# Patient Record
Sex: FEMALE | Race: WHITE | NOT HISPANIC OR LATINO
[De-identification: names, ages, dates, MRNs, and addresses within clinical notes are randomized per-mention and may not be internally consistent; named-entity substitution may affect disease eponyms.]

---
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
## 2024-01-02 ENCOUNTER — NON-APPOINTMENT (OUTPATIENT)
Age: 19
End: 2024-01-02

## 2024-01-08 ENCOUNTER — APPOINTMENT (OUTPATIENT)
Dept: PEDIATRIC GASTROENTEROLOGY | Facility: CLINIC | Age: 19
End: 2024-01-08
Payer: COMMERCIAL

## 2024-01-08 VITALS
BODY MASS INDEX: 22.68 KG/M2 | HEIGHT: 57.76 IN | HEART RATE: 76 BPM | DIASTOLIC BLOOD PRESSURE: 58 MMHG | SYSTOLIC BLOOD PRESSURE: 99 MMHG | WEIGHT: 108.03 LBS

## 2024-01-08 DIAGNOSIS — E55.9 VITAMIN D DEFICIENCY, UNSPECIFIED: ICD-10-CM

## 2024-01-08 DIAGNOSIS — K90.822 SHORT BOWEL SYNDROME WITHOUT COLON IN CONTINUITY: ICD-10-CM

## 2024-01-08 DIAGNOSIS — K59.89 OTHER SPECIFIED FUNCTIONAL INTESTINAL DISORDERS: ICD-10-CM

## 2024-01-08 DIAGNOSIS — E53.8 DEFICIENCY OF OTHER SPECIFIED B GROUP VITAMINS: ICD-10-CM

## 2024-01-08 DIAGNOSIS — Z93.3 COLOSTOMY STATUS: ICD-10-CM

## 2024-01-08 DIAGNOSIS — K50.00 CROHN'S DISEASE OF SMALL INTESTINE W/OUT COMPLICATIONS: ICD-10-CM

## 2024-01-08 PROCEDURE — 99215 OFFICE O/P EST HI 40 MIN: CPT

## 2024-01-09 NOTE — H&P PST ADULT - HEART RATE (BEATS/MIN)
Hi,  Please let him know I wonder if his home cuff is accurate.    I recommend he come in with his home blood pressure cuff for a nurse visit to compare his home pressure cuff and our cuff.  Please call him to reach out and offer a nurse visit for his pressures.    Let me know if patient has any questions.  Thank you, Norris Pantoja     100

## 2024-01-12 NOTE — PROGRESS NOTE ADULT - SUBJECTIVE AND OBJECTIVE BOX
1/12/2024      Aissatou Floyd  [address] [address]        Dear Aissatou    [facility] for your CT Calcium Heart scan. Below is an explanation of the results as well as follow up recommendations from your scan.     Findings:    Your calcium score is 0. There is no identifiable coronary plaque, however a heart healthy lifestyle is recommended to prevent coronary artery disease.     If you have any questions, please call our Heart  at,   [phone].       Sincerely,    [facility] CT Heart Scan Program   Anesthesia Pain Management Service    SUBJECTIVE: Pt doing well with IV PCA without problems reported.    Therapy:	  [ X] IV PCA	   [ ] Epidural           [ ] s/p Spinal Opoid              [ ] Postpartum infusion	  [ ] Patient controlled regional anesthesia (PCRA)    [ ] prn Analgesics    Allergies    Cipro (Hives; Rash)  Dermabond (Hives)  ferric carboxymaltose (Rash (Mild to Mod); Urticaria (Mild to Mod))  Flagyl (Hives)  fluoroquinolone antibiotics (Unknown)  latex (Unknown)  nitroimidazole amebicides (Swelling)    Intolerances      MEDICATIONS  (STANDING):  acetaminophen     Tablet .. 975 milliGRAM(s) Oral every 6 hours  cholecalciferol 1000 Unit(s) Oral daily  cyclobenzaprine 5 milliGRAM(s) Oral every 6 hours  enoxaparin Injectable 30 milliGRAM(s) SubCutaneous every 24 hours  ferrous    sulfate 325 milliGRAM(s) Oral two times a day  HYDROmorphone PCA (1 mG/mL) 30 milliLiter(s) PCA Continuous PCA Continuous  loperamide 12 milliGRAM(s) Oral four times a day  loratadine 10 milliGRAM(s) Oral daily  oxybutynin 10 milliGRAM(s) Oral two times a day  sodium chloride 0.9%. 1000 milliLiter(s) (30 mL/Hr) IV Continuous <Continuous>    MEDICATIONS  (PRN):  HYDROmorphone PCA (1 mG/mL) Rescue Clinician Bolus 0.3 milliGRAM(s) IV Push every 15 minutes PRN for Pain Scale GREATER THAN 6  naloxone Injectable 0.1 milliGRAM(s) IV Push every 3 minutes PRN For ANY of the following changes in patient status:  A. RR LESS THAN 10 breaths per minute, B. Oxygen saturation LESS THAN 90%, C. Sedation score of 6  ondansetron   Disintegrating Tablet 4 milliGRAM(s) Oral once PRN Nausea and/or Vomiting      OBJECTIVE:   [X] No new signs     [ ] Other:    Side Effects:  [X ] None			[ ] Other:    Assessment of Catheter Site:		[ ] Intact		[ ] Other:    ASSESSMENT/PLAN  [ X] Continue current therapy. Recommend non-opioid adjuvant analgesics to be used when possible and when allowed by primary surgical team.    [ ] Therapy changed to:    [ ] IV PCA       [ ] Epidural     [ ] prn Analgesics     Comments:  Patient doing well on current regimen.    Progress Note written now but Patient was seen earlier.

## 2024-01-18 ENCOUNTER — APPOINTMENT (OUTPATIENT)
Dept: PEDIATRICS | Facility: CLINIC | Age: 19
End: 2024-01-18
Payer: COMMERCIAL

## 2024-01-18 VITALS — TEMPERATURE: 97.2 F | WEIGHT: 107.6 LBS

## 2024-01-18 PROCEDURE — 99214 OFFICE O/P EST MOD 30 MIN: CPT

## 2024-01-18 RX ORDER — AMOXICILLIN AND CLAVULANATE POTASSIUM 875; 125 MG/1; MG/1
875-125 TABLET, COATED ORAL
Qty: 20 | Refills: 0 | Status: DISCONTINUED | COMMUNITY
Start: 2023-10-25 | End: 2024-01-18

## 2024-01-22 LAB
25(OH)D3 SERPL-MCNC: 14.4 NG/ML
ALBUMIN SERPL ELPH-MCNC: 5.2 G/DL
ALP BLD-CCNC: 110 U/L
ALT SERPL-CCNC: 35 U/L
ANION GAP SERPL CALC-SCNC: 14 MMOL/L
ANION GAP SERPL CALC-SCNC: 16 MMOL/L
AST SERPL-CCNC: 20 U/L
BILIRUB SERPL-MCNC: 0.3 MG/DL
BUN SERPL-MCNC: 16 MG/DL
BUN SERPL-MCNC: 16 MG/DL
CALCIUM SERPL-MCNC: 9.7 MG/DL
CALCIUM SERPL-MCNC: 9.9 MG/DL
CHLORIDE SERPL-SCNC: 105 MMOL/L
CHLORIDE SERPL-SCNC: 106 MMOL/L
CO2 SERPL-SCNC: 18 MMOL/L
CO2 SERPL-SCNC: 20 MMOL/L
CREAT SERPL-MCNC: 0.64 MG/DL
CREAT SERPL-MCNC: 0.67 MG/DL
EGFR: 129 ML/MIN/1.73M2
EGFR: 130 ML/MIN/1.73M2
GLUCOSE SERPL-MCNC: 124 MG/DL
GLUCOSE SERPL-MCNC: 126 MG/DL
MAGNESIUM SERPL-MCNC: 2.1 MG/DL
POTASSIUM SERPL-SCNC: 4 MMOL/L
POTASSIUM SERPL-SCNC: 4.1 MMOL/L
PROT SERPL-MCNC: 8.4 G/DL
SODIUM SERPL-SCNC: 140 MMOL/L
SODIUM SERPL-SCNC: 140 MMOL/L
VIT B12 SERPL-MCNC: 275 PG/ML

## 2024-01-23 LAB
BACTERIA STL CULT: NORMAL
DEPRECATED O AND P PREP STL: NORMAL
H PYLORI AG STL QL: NEGATIVE
HEMOCCULT STL QL: NEGATIVE
RV AG STL QL IA: NORMAL

## 2024-02-14 ENCOUNTER — NON-APPOINTMENT (OUTPATIENT)
Age: 19
End: 2024-02-14

## 2024-02-14 ENCOUNTER — APPOINTMENT (OUTPATIENT)
Dept: PEDIATRIC SURGERY | Facility: CLINIC | Age: 19
End: 2024-02-14
Payer: COMMERCIAL

## 2024-02-14 VITALS — TEMPERATURE: 97.3 F | WEIGHT: 103.5 LBS | HEIGHT: 57.68 IN | BODY MASS INDEX: 21.73 KG/M2

## 2024-02-14 DIAGNOSIS — Z43.3 ENCOUNTER FOR ATTENTION TO COLOSTOMY: ICD-10-CM

## 2024-02-14 PROCEDURE — 99214 OFFICE O/P EST MOD 30 MIN: CPT

## 2024-02-14 NOTE — PHYSICAL EXAM
[NL] : grossly intact [TextBox_37] : Stoma is pink and viable with liquid stool output, the appliance is correctly in place, the appliance over the mitrofanoff has some urine appreciated

## 2024-02-14 NOTE — CONSULT LETTER
[FreeTextEntry3] : Robert Bolton MD FAAP FACS Director, [facility] Division of Pediatric General, Thoracic and Endoscopic Surgery [facility]

## 2024-02-14 NOTE — HISTORY OF PRESENT ILLNESS
[FreeTextEntry1] : Simon is a 19yo female followed by Dr. Stephens in [place]. She has a history of cloacal exstrophy, bladder fistula s/p tethered cord release, bladder reconstruction, neovagina, and colostomy with Mitrofanoff. She was last seen in the office in October 2021. She was most recently evaluated by Dr. Rai with increased ostomy output, on Loperamide. Recently, Simon has remained compliant with decreasing the amount of Imodium now down to 6-8 tablets 3x/day along with Pectin pills 2x daily. She continues to appreciate issues with the colostomy and excessive, watery outputs. Simon states that she is unable to tolerate any fiber intake, as it causes her to experience nausea and vomiting. She presents today to discuss further treatment options.

## 2024-02-14 NOTE — ADDENDUM
[FreeTextEntry1] : Documented by Jarrod Odonnell acting as a scribe for Dr. Bolton on 02/14/2024.   All medical record entries made by the Scribe were at my, Dr. Bolton, direction and personally dictated by me on 02/14/2024. I have reviewed the chart and agree that the record accurately reflects my personal performances of the history, physical exam, assessment and plan. I have also personally directed, reviewed, and agree with the instructions.

## 2024-02-14 NOTE — ASSESSMENT
[FreeTextEntry1] : Simon is a 19yo female followed by Dr. Stephens in [place]. She has a history of cloacal exstrophy, bladder fistula s/p tethered cord release, bladder reconstruction, neovagina, and colostomy with Mitrofanoff. She continues to experience issues with an excessive watery output from her ostomy site. She is currently treating at home with Imodium and Pectin at home. I counseled Simon and her mother and expressed my overall reassurance regarding her current status. I then informed the family that my recommendation is to proceed with a colostogram for further evaluation. I also informed Simon and her mother that they can increase the dosage of Pectin at home, should this be desired to aid with her output issues. We also discussed possibly incorporating fiber into Simon's diet in the form of Guar Gum 15 g, which the family can try at home to aid with the colostomy output. The family has indicated their understanding and have agreed to proceed with the discussed colostogram. After completion, we will follow up via telehealth or via phone to discuss the results and the treatment plan moving forward. We will schedule this electively. I also informed the family that should they appreciate their issues with the ostomy output resolving after treatment with Guar Gum, they should try to wean off treatment with Imodium and lower the dosage taken daily.

## 2024-02-17 ENCOUNTER — APPOINTMENT (OUTPATIENT)
Dept: PEDIATRICS | Facility: CLINIC | Age: 19
End: 2024-02-17
Payer: COMMERCIAL

## 2024-02-17 VITALS — TEMPERATURE: 97.6 F | WEIGHT: 98.31 LBS

## 2024-02-17 DIAGNOSIS — N39.0 URINARY TRACT INFECTION, SITE NOT SPECIFIED: ICD-10-CM

## 2024-02-17 DIAGNOSIS — J06.9 ACUTE UPPER RESPIRATORY INFECTION, UNSPECIFIED: ICD-10-CM

## 2024-02-17 DIAGNOSIS — R50.9 FEVER, UNSPECIFIED: ICD-10-CM

## 2024-02-17 LAB
BILIRUB UR QL STRIP: NORMAL
GLUCOSE UR-MCNC: NORMAL
HCG UR QL: 0.2 EU/DL
HGB UR QL STRIP.AUTO: NORMAL
KETONES UR-MCNC: 40
LEUKOCYTE ESTERASE UR QL STRIP: NORMAL
NITRITE UR QL STRIP: ABNORMAL
PH UR STRIP: 6.5
PROT UR STRIP-MCNC: 100
SP GR UR STRIP: 1020

## 2024-02-17 PROCEDURE — 81003 URINALYSIS AUTO W/O SCOPE: CPT | Mod: QW

## 2024-02-17 PROCEDURE — 99214 OFFICE O/P EST MOD 30 MIN: CPT | Mod: 25

## 2024-02-17 NOTE — PATIENT PROFILE ADULT - FALL HARM RISK - FALLEN IN PAST
Name: Li Kimball ADMIT: 2024   : 1971  PCP: Tank Shea APRN    MRN: [id number] LOS: 3 days   AGE/SEX: 52 y.o. female  ROOM: [facility]     Subjective   Subjective   Patient is lying on the bed and continues to complain of abdominal discomfort/pain but improving on a day-to-day basis.  Denies nausea, vomiting, chest pain, shortness of breath.       Objective   Objective   Vital Signs  Temp:  [97.3 °F (36.3 °C)-97.9 °F (36.6 °C)] 97.9 °F (36.6 °C)  Heart Rate:  [77-99] 78  Resp:  [16-20] 20  BP: (109-131)/(66-79) 114/74  SpO2:  [97 %-98 %] 98 %  on   ;   Device (Oxygen Therapy): room air  Body mass index is 37.38 kg/m².  Physical Exam  HEENT: PERRLA, extraocular movements intact, Scleras no icterus  Neck: Supple, no JVD  Cardiovascular: Regular rate and rhythm with normal S1 and S2  Respiratory: Fairly clear to auscultation bilaterally with no wheezes  GI: Soft, appropriately tender postoperatively and incision sites noted, bowel sounds are present.  Extremities: No edema, palpable pedal pulses  Neurologic: Grossly nonfocal, no facial asymmetry      Results Review     I reviewed the patient's new clinical results.  Results from last 7 days   Lab Units 24  0508 24  0524 02/15/24  0544 24  0547   WBC 10*3/mm3 7.96 10.43 13.84* 17.95*   HEMOGLOBIN g/dL 10.0* 10.6* 10.7* 11.5*   PLATELETS 10*3/mm3 471* 460* 356 393     Results from last 7 days   Lab Units 24  0508 24  0524 24  0034 02/15/24  0544 24  0547   SODIUM mmol/L 139 139  --  139 136   POTASSIUM mmol/L 3.4* 3.7 3.8 3.1* 3.3*   CHLORIDE mmol/L 110* 108*  --  104 103   CO2 mmol/L 20.6* 21.9*  --  22.1 20.2*   BUN mg/dL 14 23*  --  19 14   CREATININE mg/dL 0.66 0.81  --  0.91 0.80   GLUCOSE mg/dL 102* 102*  --  114* 137*   EGFR mL/min/1.73 105.0 86.9  --  75.6 88.2     Results from last 7 days   Lab Units 24  0737 24  0134   ALBUMIN g/dL 4.0 4.0   BILIRUBIN mg/dL 0.4 0.2   ALK PHOS  U/L 48 46   AST (SGOT) U/L 13 14   ALT (SGPT) U/L 13 15     Results from last 7 days   Lab Units 02/17/24  0508 02/16/24  0524 02/15/24  0544 02/14/24  0547 02/13/24  0737 02/13/24  0134   CALCIUM mg/dL 7.7* 8.1* 8.2* 8.6 9.5 9.9   ALBUMIN g/dL  --   --   --   --  4.0 4.0   MAGNESIUM mg/dL  --   --   --   --  1.8  --      Results from last 7 days   Lab Units 02/13/24  0408   LACTATE mmol/L 1.9     Hemoglobin A1C   Date/Time Value Ref Range Status   02/15/2024 0544 6.20 (H) 4.80 - 5.60 % Final     Glucose   Date/Time Value Ref Range Status   02/17/2024 1128 69 (L) 70 - 130 mg/dL Final   02/17/2024 0621 100 70 - 130 mg/dL Final   02/16/2024 2354 104 70 - 130 mg/dL Final   02/16/2024 1735 112 70 - 130 mg/dL Final   02/16/2024 1124 88 70 - 130 mg/dL Final   02/16/2024 0627 89 70 - 130 mg/dL Final   02/16/2024 0024 96 70 - 130 mg/dL Final       No radiology results for the last day    I have personally reviewed all medications:  Scheduled Medications  acetaminophen, 975 mg, Oral, Q6H  docusate sodium, 100 mg, Oral, BID  enoxaparin, 40 mg, Subcutaneous, Nightly  insulin regular, 2-7 Units, Subcutaneous, Q6H  levothyroxine, 25 mcg, Oral, Q AM  minoxidil, 1.25 mg, Oral, Daily  piperacillin-tazobactam, 3.375 g, Intravenous, Q8H  polyethylene glycol, 17 g, Oral, Daily  sodium chloride, 10 mL, Intravenous, Q12H    Infusions  sodium chloride, 100 mL/hr, Last Rate: 100 mL/hr (02/17/24 1157)    Diet  Diet: Liquid Diets; Clear Liquid; Fluid Consistency: Thin (IDDSI 0)    I have personally reviewed:  [x]  Laboratory   [x]  Microbiology   [x]  Radiology   [x]  EKG/Telemetry  [x]  Cardiology/Vascular   []  Pathology    []  Records       Assessment/Plan     Active Hospital Problems    Diagnosis  POA    **Acute appendicitis [K35.80]  Yes    Colitis [K52.9]  Yes    Diabetes mellitus type 2, diet-controlled [E11.9]  Yes    Chronic anticoagulation [Z79.01]  Not Applicable    Recurrent deep vein thrombosis (DVT) [I82.409]  Yes     Hypothyroidism, adult [E03.9]  Yes      Resolved Hospital Problems   No resolved problems to display.       52 y.o. female admitted with Acute appendicitis.    Acute appendicitis with purulent peritonitis, continue with fluids and status post appendectomy and today's postop day #3.  Continue with IV Zosyn and appreciate surgical input.  Patient remains on liquid diet given that she has ongoing abdominal pain and is improving but I will bite slowly.  Advancement of diet and resumption of Eliquis per surgical team.  Plan is to complete a 10-day course of antibiotics.    2.  Morbid obesity, counseled to lose weight.    3.  Hypothyroidism, on Synthroid.    4.  Hyperglycemia, hemoglobin A1c level is 6.2 and will be placed on metformin upon discharge.  Continue with corrective dose insulin.    5.  History of multiple blood clots including DVT/PE and does have an IVC filter in place.  Eliquis is on hold per surgical team's request and on Lovenox for DVT prophylaxis.    6.  Hypokalemia, on replacement protocol.    7.  CODE STATUS is full code.    8.  DVT prophylaxis, on Lovenox.    Estimated discharge date, next 1 to 2 days.    Copied text on this note has been reviewed by me on 2/17/2024    Donovan Sosa MD  [facility]  02/17/24  14:13 EST       No

## 2024-02-17 NOTE — PHYSICAL EXAM
[Tired appearing] : tired appearing [Soft] : soft [Tender] : nontender [Distended] : nondistended [Normal Bowel Sounds] : normal bowel sounds [NL] : warm, clear [FreeTextEntry4] : nasal congestion [FreeTextEntry9] : +colostomy, area around wnl

## 2024-02-17 NOTE — DISCUSSION/SUMMARY
[FreeTextEntry1] : RVP sent Symptoms likely due to viral URI.  Recommend supportive care including antipyretics, fluids, nasal saline followed by nasal suction and use of humidifier. Discussed honey for cough if over age 1. Consider Mucinex for older kids. Return if symptoms worsen or persist.  UA c/w UTI will start abx mother says augmentin worked well with last UTI in october 2023 UCx sent

## 2024-02-17 NOTE — HISTORY OF PRESENT ILLNESS
[de-identified] : fatigue, poor appetite, abdominal pain since Thursday. Fever of 101F on Friday. Liquidy green stools.  [FreeTextEntry6] : nasal congestion  mild cough

## 2024-02-18 ENCOUNTER — NON-APPOINTMENT (OUTPATIENT)
Age: 19
End: 2024-02-18

## 2024-02-18 LAB
CORONAVIRUS (229E,HKU1,NL63,OC43): DETECTED
RAPID RVP RESULT: DETECTED
SARS-COV-2 RNA PNL RESP NAA+PROBE: NOT DETECTED

## 2024-02-19 ENCOUNTER — NON-APPOINTMENT (OUTPATIENT)
Age: 19
End: 2024-02-19

## 2024-02-19 ENCOUNTER — RX RENEWAL (OUTPATIENT)
Age: 19
End: 2024-02-19

## 2024-02-21 ENCOUNTER — NON-APPOINTMENT (OUTPATIENT)
Age: 19
End: 2024-02-21

## 2024-03-08 ENCOUNTER — APPOINTMENT (OUTPATIENT)
Dept: PEDIATRIC ORTHOPEDIC SURGERY | Facility: CLINIC | Age: 19
End: 2024-03-08
Payer: COMMERCIAL

## 2024-03-08 DIAGNOSIS — M25.572 PAIN IN LEFT ANKLE AND JOINTS OF LEFT FOOT: ICD-10-CM

## 2024-03-08 DIAGNOSIS — M79.672 PAIN IN LEFT FOOT: ICD-10-CM

## 2024-03-08 DIAGNOSIS — Q06.8 OTHER SPECIFIED CONGENITAL MALFORMATIONS OF SPINAL CORD: ICD-10-CM

## 2024-03-08 DIAGNOSIS — Q66.40 CONGEN TALIPES CALCANEOVALGUS, UNSP FOOT: ICD-10-CM

## 2024-03-08 PROCEDURE — 73610 X-RAY EXAM OF ANKLE: CPT | Mod: LT

## 2024-03-08 PROCEDURE — 99214 OFFICE O/P EST MOD 30 MIN: CPT | Mod: 25

## 2024-03-08 NOTE — DEVELOPMENTAL MILESTONES
[Normal] : Developmental history within normal limits [Roll Over: ___ Months] : Roll Over: [unfilled] months [Sit Up: ___ Months] : Sit Up: [unfilled] months [Walk ___ Months] : Walk: [unfilled] months [Pull Self to Stand ___ Months] : Pull self to stand: [unfilled] months [Verbally] : verbally [FreeTextEntry3] : Bilateral AFOs

## 2024-03-12 ENCOUNTER — NON-APPOINTMENT (OUTPATIENT)
Age: 19
End: 2024-03-12

## 2024-03-15 ENCOUNTER — OUTPATIENT (OUTPATIENT)
Dept: OUTPATIENT SERVICES | Facility: HOSPITAL | Age: 19
LOS: 1 days | End: 2024-03-15

## 2024-03-15 ENCOUNTER — APPOINTMENT (OUTPATIENT)
Dept: RADIOLOGY | Facility: HOSPITAL | Age: 19
End: 2024-03-15
Payer: COMMERCIAL

## 2024-03-15 DIAGNOSIS — Q42.3 CONGENITAL ABSENCE, ATRESIA AND STENOSIS OF ANUS WITHOUT FISTULA: Chronic | ICD-10-CM

## 2024-03-15 DIAGNOSIS — Z98.89 OTHER SPECIFIED POSTPROCEDURAL STATES: Chronic | ICD-10-CM

## 2024-03-15 DIAGNOSIS — T14.90 INJURY, UNSPECIFIED: Chronic | ICD-10-CM

## 2024-03-15 DIAGNOSIS — Q45.8 OTHER SPECIFIED CONGENITAL MALFORMATIONS OF DIGESTIVE SYSTEM: Chronic | ICD-10-CM

## 2024-03-15 DIAGNOSIS — M20.60 ACQUIRED DEFORMITIES OF TOE(S), UNSPECIFIED, UNSPECIFIED FOOT: Chronic | ICD-10-CM

## 2024-03-15 DIAGNOSIS — D22.9 MELANOCYTIC NEVI, UNSPECIFIED: Chronic | ICD-10-CM

## 2024-03-15 DIAGNOSIS — Q64.12 CLOACAL EXSTROPHY OF URINARY BLADDER: Chronic | ICD-10-CM

## 2024-03-15 DIAGNOSIS — M95.5 ACQUIRED DEFORMITY OF PELVIS: Chronic | ICD-10-CM

## 2024-03-15 DIAGNOSIS — Q45.8 OTHER SPECIFIED CONGENITAL MALFORMATIONS OF DIGESTIVE SYSTEM: ICD-10-CM

## 2024-03-15 DIAGNOSIS — Q06.8 OTHER SPECIFIED CONGENITAL MALFORMATIONS OF SPINAL CORD: Chronic | ICD-10-CM

## 2024-03-15 DIAGNOSIS — Z98.890 OTHER SPECIFIED POSTPROCEDURAL STATES: Chronic | ICD-10-CM

## 2024-03-15 DIAGNOSIS — Q06.9 CONGENITAL MALFORMATION OF SPINAL CORD, UNSPECIFIED: Chronic | ICD-10-CM

## 2024-03-15 DIAGNOSIS — N35.9 URETHRAL STRICTURE, UNSPECIFIED: Chronic | ICD-10-CM

## 2024-03-15 DIAGNOSIS — N21.0 CALCULUS IN BLADDER: Chronic | ICD-10-CM

## 2024-03-15 DIAGNOSIS — N32.2 VESICAL FISTULA, NOT ELSEWHERE CLASSIFIED: Chronic | ICD-10-CM

## 2024-03-15 PROCEDURE — 74270 X-RAY XM COLON 1CNTRST STD: CPT | Mod: 26

## 2024-04-11 ENCOUNTER — APPOINTMENT (OUTPATIENT)
Dept: PAIN MANAGEMENT | Facility: CLINIC | Age: 19
End: 2024-04-11
Payer: COMMERCIAL

## 2024-04-11 VITALS — HEIGHT: 57 IN | BODY MASS INDEX: 21.14 KG/M2 | WEIGHT: 98 LBS

## 2024-04-11 DIAGNOSIS — Z98.1 ARTHRODESIS STATUS: ICD-10-CM

## 2024-04-11 DIAGNOSIS — M79.18 MYALGIA, OTHER SITE: ICD-10-CM

## 2024-04-11 PROCEDURE — 99213 OFFICE O/P EST LOW 20 MIN: CPT

## 2024-04-11 RX ORDER — METHOCARBAMOL 500 MG/1
500 TABLET, FILM COATED ORAL TWICE DAILY
Qty: 180 | Refills: 1 | Status: ACTIVE | COMMUNITY
Start: 2023-12-07 | End: 1900-01-01

## 2024-04-11 NOTE — DATA REVIEWED
[CT Scan] : CT scan [Thoracic Spine] : thoracic spine [Lumbar Spine] : lumbar spine [Report was reviewed and noted in the chart] : The report was reviewed and noted in the chart [I reviewed the films/CD] : I reviewed the films/CD

## 2024-04-11 NOTE — REASON FOR VISIT
[Follow-Up Visit] : a follow-up pain management visit [Parent] : parent [FreeTextEntry2] : lower back pain

## 2024-04-11 NOTE — ASSESSMENT
[FreeTextEntry1] : A discussion regarding available pain management treatment options occurred with the patient. These included interventional, rehabilitative, pharmacological, and alternative modalities. We will proceed with the following:  Interventional treatment options: - None indicated at present time - Can consider retrial TPI therapy for ongoing lumbar myofascial pain component - see additional instructions below  Rehabilitative options: - continue physical therapy -Encourage participation in active HEP as tolerated  Medication based treatment options: - Continue topical OTC analgesics on as-needed basis - Can trial Celebrex 200 mg daily on as-needed basis - Poor candidate for oral NSAIDs however secondary to chronic GI ulcer; Advise she discuss NSAID use with her gastroenterologist before initiating - Continue methocarbamol 500 mg up to BID as needed for spasm - see additional instructions below  Complementary treatment options: - lifestyle modifications discussed - Previous discussed trial of acupuncture therapy; they are considering  Additional treatment recommendations as follows: - Follow-up with neurosurgery as directed - Patient will follow-up in 3-6 months or as needed basis  The documentation recorded by the scribe, in my presence, accurately reflects the service I personally performed and the decisions made by me with my edits as appropriate.  I, Jassi Naik acting as scribe, attest that this documentation has been prepared under the direction and in the presence of Provider Donte Chau DO.

## 2024-04-11 NOTE — HISTORY OF PRESENT ILLNESS
[Lower back] : lower back [Sudden] : sudden [7] : 7 [Dull/Aching] : dull/aching [Localized] : localized [Radiating] : radiating [Intermittent] : intermittent [Household chores] : household chores [Leisure] : leisure [Social interactions] : social interactions [Nothing helps with pain getting better] : Nothing helps with pain getting better [] : no [FreeTextEntry1] : legs [FreeTextEntry7] : hipd and legs [de-identified] : lumbar and thoracic mri

## 2024-04-11 NOTE — PHYSICAL EXAM
[de-identified] : Constitutional:   - No acute distress   Neurological:   - normal mood and affect   - alert and oriented x 3     Lumbar Spine Exam:   Inspection: erythema (-)  ecchymosis (-)  rashes (-)  alignment: lumbar scoliosis  Well healed surgical scar midline  Palpation:  Midline lumbar tenderness:            (-)  midline thoracic tenderness:          (-)  Lumbar paraspinal tenderness:  L (+) ; R (+)  thoracic paraspinal tenderness: L (-) ; R (-)  sciatic nerve tenderness :          L (-) ; R (-)   ROM: WNL  Strength:   grossly intact  Special Tests:  SLR:                            R (-) ; L (-)  Facet loading:             R (-) ; L (-)   Neurologic:  SILT throughout right lower extremity  SILT throughout left lower extremity   Gait:   mildly antalgic gait  ambulates without assistive device

## 2024-04-16 ENCOUNTER — APPOINTMENT (OUTPATIENT)
Dept: CT IMAGING | Facility: CLINIC | Age: 19
End: 2024-04-16
Payer: COMMERCIAL

## 2024-04-16 ENCOUNTER — APPOINTMENT (OUTPATIENT)
Dept: RADIOLOGY | Facility: CLINIC | Age: 19
End: 2024-04-16
Payer: COMMERCIAL

## 2024-04-16 ENCOUNTER — OUTPATIENT (OUTPATIENT)
Dept: OUTPATIENT SERVICES | Facility: HOSPITAL | Age: 19
LOS: 1 days | End: 2024-04-16
Payer: COMMERCIAL

## 2024-04-16 DIAGNOSIS — D22.9 MELANOCYTIC NEVI, UNSPECIFIED: Chronic | ICD-10-CM

## 2024-04-16 DIAGNOSIS — N21.0 CALCULUS IN BLADDER: Chronic | ICD-10-CM

## 2024-04-16 DIAGNOSIS — Q45.8 OTHER SPECIFIED CONGENITAL MALFORMATIONS OF DIGESTIVE SYSTEM: Chronic | ICD-10-CM

## 2024-04-16 DIAGNOSIS — Q64.12 CLOACAL EXSTROPHY OF URINARY BLADDER: Chronic | ICD-10-CM

## 2024-04-16 DIAGNOSIS — M95.5 ACQUIRED DEFORMITY OF PELVIS: Chronic | ICD-10-CM

## 2024-04-16 DIAGNOSIS — Q42.3 CONGENITAL ABSENCE, ATRESIA AND STENOSIS OF ANUS WITHOUT FISTULA: Chronic | ICD-10-CM

## 2024-04-16 DIAGNOSIS — T14.90 INJURY, UNSPECIFIED: Chronic | ICD-10-CM

## 2024-04-16 DIAGNOSIS — Z98.890 OTHER SPECIFIED POSTPROCEDURAL STATES: Chronic | ICD-10-CM

## 2024-04-16 DIAGNOSIS — Q06.8 OTHER SPECIFIED CONGENITAL MALFORMATIONS OF SPINAL CORD: Chronic | ICD-10-CM

## 2024-04-16 DIAGNOSIS — M20.60 ACQUIRED DEFORMITIES OF TOE(S), UNSPECIFIED, UNSPECIFIED FOOT: Chronic | ICD-10-CM

## 2024-04-16 DIAGNOSIS — Z98.89 OTHER SPECIFIED POSTPROCEDURAL STATES: Chronic | ICD-10-CM

## 2024-04-16 DIAGNOSIS — Q06.9 CONGENITAL MALFORMATION OF SPINAL CORD, UNSPECIFIED: Chronic | ICD-10-CM

## 2024-04-16 DIAGNOSIS — M54.50 LOW BACK PAIN, UNSPECIFIED: ICD-10-CM

## 2024-04-16 DIAGNOSIS — N32.2 VESICAL FISTULA, NOT ELSEWHERE CLASSIFIED: Chronic | ICD-10-CM

## 2024-04-16 DIAGNOSIS — N35.9 URETHRAL STRICTURE, UNSPECIFIED: Chronic | ICD-10-CM

## 2024-04-16 PROCEDURE — 72110 X-RAY EXAM L-2 SPINE 4/>VWS: CPT | Mod: 26

## 2024-04-16 PROCEDURE — 72131 CT LUMBAR SPINE W/O DYE: CPT | Mod: 26

## 2024-04-16 PROCEDURE — 72131 CT LUMBAR SPINE W/O DYE: CPT

## 2024-04-16 PROCEDURE — 72110 X-RAY EXAM L-2 SPINE 4/>VWS: CPT

## 2024-04-19 ENCOUNTER — APPOINTMENT (OUTPATIENT)
Dept: PEDIATRICS | Facility: CLINIC | Age: 19
End: 2024-04-19
Payer: COMMERCIAL

## 2024-04-19 VITALS
OXYGEN SATURATION: 97 % | BODY MASS INDEX: 22.48 KG/M2 | HEART RATE: 84 BPM | DIASTOLIC BLOOD PRESSURE: 64 MMHG | SYSTOLIC BLOOD PRESSURE: 112 MMHG | WEIGHT: 110 LBS | HEIGHT: 58.75 IN

## 2024-04-19 DIAGNOSIS — S90.819A ABRASION, UNSPECIFIED FOOT, INITIAL ENCOUNTER: ICD-10-CM

## 2024-04-19 DIAGNOSIS — K56.600 PARTIAL INTESTINAL OBSTRUCTION, UNSPECIFIED AS TO CAUSE: ICD-10-CM

## 2024-04-19 DIAGNOSIS — R19.7 DIARRHEA, UNSPECIFIED: ICD-10-CM

## 2024-04-19 DIAGNOSIS — M25.572 PAIN IN RIGHT ANKLE AND JOINTS OF RIGHT FOOT: ICD-10-CM

## 2024-04-19 DIAGNOSIS — R19.5 OTHER FECAL ABNORMALITIES: ICD-10-CM

## 2024-04-19 DIAGNOSIS — Z87.898 PERSONAL HISTORY OF OTHER SPECIFIED CONDITIONS: ICD-10-CM

## 2024-04-19 DIAGNOSIS — Z11.59 ENCOUNTER FOR SCREENING FOR OTHER VIRAL DISEASES: ICD-10-CM

## 2024-04-19 DIAGNOSIS — M79.671 PAIN IN RIGHT FOOT: ICD-10-CM

## 2024-04-19 DIAGNOSIS — Z01.818 ENCOUNTER FOR OTHER PREPROCEDURAL EXAMINATION: ICD-10-CM

## 2024-04-19 DIAGNOSIS — Z87.448 PERSONAL HISTORY OF OTHER DISEASES OF URINARY SYSTEM: ICD-10-CM

## 2024-04-19 DIAGNOSIS — R21 RASH AND OTHER NONSPECIFIC SKIN ERUPTION: ICD-10-CM

## 2024-04-19 DIAGNOSIS — R11.0 NAUSEA: ICD-10-CM

## 2024-04-19 DIAGNOSIS — M79.672 PAIN IN RIGHT FOOT: ICD-10-CM

## 2024-04-19 DIAGNOSIS — M25.571 PAIN IN RIGHT ANKLE AND JOINTS OF RIGHT FOOT: ICD-10-CM

## 2024-04-19 DIAGNOSIS — Z00.00 ENCOUNTER FOR GENERAL ADULT MEDICAL EXAMINATION W/OUT ABNORMAL FINDINGS: ICD-10-CM

## 2024-04-19 DIAGNOSIS — Z23 ENCOUNTER FOR IMMUNIZATION: ICD-10-CM

## 2024-04-19 PROCEDURE — 92551 PURE TONE HEARING TEST AIR: CPT

## 2024-04-19 PROCEDURE — 96127 BRIEF EMOTIONAL/BEHAV ASSMT: CPT

## 2024-04-19 PROCEDURE — 90471 IMMUNIZATION ADMIN: CPT

## 2024-04-19 PROCEDURE — 99395 PREV VISIT EST AGE 18-39: CPT | Mod: 25

## 2024-04-19 PROCEDURE — 90715 TDAP VACCINE 7 YRS/> IM: CPT

## 2024-04-19 PROCEDURE — 99173 VISUAL ACUITY SCREEN: CPT | Mod: 59

## 2024-04-19 PROCEDURE — 96160 PT-FOCUSED HLTH RISK ASSMT: CPT | Mod: 59

## 2024-04-19 RX ORDER — NYSTATIN 100000 1/G
100000 POWDER TOPICAL TWICE DAILY
Refills: 0 | Status: COMPLETED | COMMUNITY
Start: 2021-06-14 | End: 2024-04-19

## 2024-04-19 RX ORDER — AMOXICILLIN AND CLAVULANATE POTASSIUM 875; 125 MG/1; MG/1
875-125 TABLET, COATED ORAL
Qty: 20 | Refills: 0 | Status: COMPLETED | COMMUNITY
Start: 2024-02-17 | End: 2024-04-19

## 2024-04-19 RX ORDER — OFLOXACIN OTIC 3 MG/ML
0.3 SOLUTION AURICULAR (OTIC)
Qty: 10 | Refills: 0 | Status: COMPLETED | COMMUNITY
Start: 2023-02-14 | End: 2024-04-19

## 2024-04-19 RX ORDER — MUPIROCIN 20 MG/G
2 OINTMENT TOPICAL 3 TIMES DAILY
Qty: 1 | Refills: 0 | Status: COMPLETED | COMMUNITY
Start: 2024-01-18 | End: 2024-04-19

## 2024-05-03 DIAGNOSIS — E61.1 IRON DEFICIENCY: ICD-10-CM

## 2024-05-14 LAB
BASOPHILS # BLD AUTO: 0.05 K/UL
BASOPHILS NFR BLD AUTO: 0.8 %
EOSINOPHIL # BLD AUTO: 0.07 K/UL
EOSINOPHIL NFR BLD AUTO: 1.1 %
HCT VFR BLD CALC: 34.7 %
HGB BLD-MCNC: 11.2 G/DL
IMM GRANULOCYTES NFR BLD AUTO: 0.3 %
LYMPHOCYTES # BLD AUTO: 1.85 K/UL
LYMPHOCYTES NFR BLD AUTO: 29.4 %
MAN DIFF?: NORMAL
MCHC RBC-ENTMCNC: 31.7 PG
MCHC RBC-ENTMCNC: 32.3 GM/DL
MCV RBC AUTO: 98.3 FL
MONOCYTES # BLD AUTO: 0.37 K/UL
MONOCYTES NFR BLD AUTO: 5.9 %
NEUTROPHILS # BLD AUTO: 3.94 K/UL
NEUTROPHILS NFR BLD AUTO: 62.5 %
PLATELET # BLD AUTO: 373 K/UL
RBC # BLD: 3.53 M/UL
RBC # BLD: 3.53 M/UL
RBC # FLD: 11.7 %
RETICS # AUTO: 1.1 %
RETICS AGGREG/RBC NFR: 38.8 K/UL
WBC # FLD AUTO: 6.3 K/UL

## 2024-05-20 ENCOUNTER — TRANSCRIPTION ENCOUNTER (OUTPATIENT)
Age: 19
End: 2024-05-20

## 2024-05-20 ENCOUNTER — APPOINTMENT (OUTPATIENT)
Dept: CT IMAGING | Facility: CLINIC | Age: 19
End: 2024-05-20
Payer: COMMERCIAL

## 2024-05-20 ENCOUNTER — OUTPATIENT (OUTPATIENT)
Dept: OUTPATIENT SERVICES | Facility: HOSPITAL | Age: 19
LOS: 1 days | End: 2024-05-20
Payer: COMMERCIAL

## 2024-05-20 DIAGNOSIS — T14.90 INJURY, UNSPECIFIED: Chronic | ICD-10-CM

## 2024-05-20 DIAGNOSIS — Q45.8 OTHER SPECIFIED CONGENITAL MALFORMATIONS OF DIGESTIVE SYSTEM: Chronic | ICD-10-CM

## 2024-05-20 DIAGNOSIS — Q42.3 CONGENITAL ABSENCE, ATRESIA AND STENOSIS OF ANUS WITHOUT FISTULA: Chronic | ICD-10-CM

## 2024-05-20 DIAGNOSIS — N32.2 VESICAL FISTULA, NOT ELSEWHERE CLASSIFIED: Chronic | ICD-10-CM

## 2024-05-20 DIAGNOSIS — Z98.890 OTHER SPECIFIED POSTPROCEDURAL STATES: Chronic | ICD-10-CM

## 2024-05-20 DIAGNOSIS — Q06.9 CONGENITAL MALFORMATION OF SPINAL CORD, UNSPECIFIED: Chronic | ICD-10-CM

## 2024-05-20 DIAGNOSIS — Q06.8 OTHER SPECIFIED CONGENITAL MALFORMATIONS OF SPINAL CORD: Chronic | ICD-10-CM

## 2024-05-20 DIAGNOSIS — Q64.12 CLOACAL EXSTROPHY OF URINARY BLADDER: Chronic | ICD-10-CM

## 2024-05-20 DIAGNOSIS — N21.0 CALCULUS IN BLADDER: Chronic | ICD-10-CM

## 2024-05-20 DIAGNOSIS — M20.60 ACQUIRED DEFORMITIES OF TOE(S), UNSPECIFIED, UNSPECIFIED FOOT: Chronic | ICD-10-CM

## 2024-05-20 DIAGNOSIS — Z98.89 OTHER SPECIFIED POSTPROCEDURAL STATES: Chronic | ICD-10-CM

## 2024-05-20 DIAGNOSIS — M95.5 ACQUIRED DEFORMITY OF PELVIS: Chronic | ICD-10-CM

## 2024-05-20 DIAGNOSIS — N35.9 URETHRAL STRICTURE, UNSPECIFIED: Chronic | ICD-10-CM

## 2024-05-20 DIAGNOSIS — D22.9 MELANOCYTIC NEVI, UNSPECIFIED: Chronic | ICD-10-CM

## 2024-05-20 DIAGNOSIS — Z09 ENCOUNTER FOR FOLLOW-UP EXAMINATION AFTER COMPLETED TREATMENT FOR CONDITIONS OTHER THAN MALIGNANT NEOPLASM: ICD-10-CM

## 2024-05-20 PROCEDURE — 72192 CT PELVIS W/O DYE: CPT

## 2024-05-20 PROCEDURE — 72192 CT PELVIS W/O DYE: CPT | Mod: 26

## 2024-05-20 RX ORDER — ONDANSETRON 8 MG/1
8 TABLET ORAL
Qty: 90 | Refills: 3 | Status: ACTIVE | COMMUNITY
Start: 1900-01-01 | End: 1900-01-01

## 2024-06-03 ENCOUNTER — APPOINTMENT (OUTPATIENT)
Dept: DERMATOLOGY | Facility: CLINIC | Age: 19
End: 2024-06-03

## 2024-06-03 NOTE — ED ADULT NURSE NOTE - SUICIDE SCREENING QUESTION 3
oriented to person, place, and time. Mental status is at baseline.      Cranial Nerves: No cranial nerve deficit.      Motor: No weakness.   Psychiatric:         Mood and Affect: Mood normal.         Behavior: Behavior normal.         Thought Content: Thought content normal.         Judgment: Judgment normal.         ASSESSMENT/PLAN:  1. Psoriatic arthritis (HCC)  Assessment & Plan:    patient referred to rheumatology for continued management.  Orders:  -     External Referral To Rheumatology  -     Nancy Hogan MD, Rheumatology, [facility]  -     celecoxib (CELEBREX) 200 MG capsule; Take 1 capsule by mouth daily, Disp-60 capsule, R-5Normal  2. Dysphagia, unspecified type  Assessment & Plan:    referred to GI for further follow-up.  Orders:  -     Emilio Reyes MD, Gastroenterology, [facility]  3. Esophageal stenosis  -     Emilio Reyes MD, Gastroenterology, [facility]  4. Depression with anxiety  Assessment & Plan:  Referred to    [facility]  [address], [place] [id number] , [place] [id number]  Phone [phone] or [phone]    OR    [facility]  [address]  [place] [address]   Orders:  -     hydrOXYzine HCl (ATARAX) 50 MG tablet; Take 1 tablet by mouth every 8 hours as needed for Anxiety Medication is sedating, exercise course driving etc., Disp-90 tablet, R-5Normal  -     busPIRone (BUSPAR) 10 MG tablet; Take 1 tablet by mouth in the morning and at bedtime, Disp-60 tablet, R-5Normal  5. Hearing loss of right ear, unspecified hearing loss type  -     Giovani Tee MD, Otolaryngology, [facility]  6. Colon cancer screening  -     Emilio Reyes MD, Gastroenterology, [facility]  7. Encounter for screening mammogram for malignant neoplasm of breast  -     [facility] MARKOS DIGITAL SCREEN BILATERAL; Future  8. Migraine with aura and without status migrainosus, not 
No

## 2024-06-05 ENCOUNTER — NON-APPOINTMENT (OUTPATIENT)
Age: 19
End: 2024-06-05

## 2024-06-10 ENCOUNTER — APPOINTMENT (OUTPATIENT)
Dept: ORTHOPEDIC SURGERY | Facility: CLINIC | Age: 19
End: 2024-06-10
Payer: COMMERCIAL

## 2024-06-10 VITALS — HEIGHT: 58.75 IN | WEIGHT: 110 LBS | BODY MASS INDEX: 22.48 KG/M2

## 2024-06-10 PROCEDURE — 73503 X-RAY EXAM HIP UNI 4/> VIEWS: CPT | Mod: RT

## 2024-06-10 PROCEDURE — 99215 OFFICE O/P EST HI 40 MIN: CPT

## 2024-06-10 NOTE — CONSULT LETTER
[Dear  ___] : Dear  [unfilled], [Consult Letter:] : I had the pleasure of evaluating your patient, [unfilled]. [Please see my note below.] : Please see my note below. [Sincerely,] : Sincerely, [FreeTextEntry3] : Dr. Jose López

## 2024-06-10 NOTE — ADDENDUM
[FreeTextEntry1] : Documented by Aletha Law acting as a scribe for Dr. López on 06/10/2024. All medical record entries made by the Scribe were at my, Dr. López's, direction and personally dictated by me on 06/10/2024. I have reviewed the chart and agree that the record accurately reflects my personal performance of the history, physical exam, procedure and imaging.

## 2024-06-10 NOTE — PHYSICAL EXAM
[de-identified] : General: Well appearing, no acute distress Neurologic: A&Ox3, No focal deficits Head: NCAT without abrasions, lacerations, or ecchymosis to head, face, or scalp Eyes: No scleral icterus, no gross abnormalities Respiratory: Equal chest wall expansion bilaterally, no accessory muscle use Lymphatic: No lymphadenopathy palpated Skin: Warm and dry Psychiatric: Normal mood and affect    [de-identified] : The following radiographs were ordered and read by me during this patient's visit. I reviewed each radiograph in detail with the patient and discussed the findings as highlighted below. 3 views of the pelvis and 2 views of the right hip were obtained today that show hip dysplasia of about 15 degrees.

## 2024-06-10 NOTE — DISCUSSION/SUMMARY
[de-identified] : We had a thorough discussion regarding the nature of her pain, the pathophysiology, as well as all treatment options. Based on clinical exam and radiograph findings they have right hip dysplasia. I discussed operative and non-operative treatment modalities, such as a pelvic osteotomy. I recommend the patient f/u with a surgeon who specializes in osteotomies. All questions were answered and the patient verbalized understanding. The patient is in agreement with this treatment plan.

## 2024-06-10 NOTE — HISTORY OF PRESENT ILLNESS
[de-identified] : GERI is a 18 year female here today for right hip pain. She endorses pain in her buttocks. She has a hx of multiple fusion surgeries.

## 2024-06-11 ENCOUNTER — APPOINTMENT (OUTPATIENT)
Dept: PEDIATRIC ORTHOPEDIC SURGERY | Facility: CLINIC | Age: 19
End: 2024-06-11
Payer: COMMERCIAL

## 2024-06-11 ENCOUNTER — NON-APPOINTMENT (OUTPATIENT)
Age: 19
End: 2024-06-11

## 2024-06-11 ENCOUNTER — APPOINTMENT (OUTPATIENT)
Dept: PAIN MANAGEMENT | Facility: CLINIC | Age: 19
End: 2024-06-11

## 2024-06-11 DIAGNOSIS — Q45.8 OTHER SPECIFIED CONGENITAL MALFORMATIONS OF DIGESTIVE SYSTEM: ICD-10-CM

## 2024-06-11 DIAGNOSIS — Q64.12 OTHER SPECIFIED CONGENITAL MALFORMATIONS OF DIGESTIVE SYSTEM: ICD-10-CM

## 2024-06-11 DIAGNOSIS — Q65.89 OTHER SPECIFIED CONGENITAL DEFORMITIES OF HIP: ICD-10-CM

## 2024-06-11 PROBLEM — M79.672 LEFT FOOT PAIN: Status: ACTIVE | Noted: 2022-08-09

## 2024-06-11 PROCEDURE — 99204 OFFICE O/P NEW MOD 45 MIN: CPT

## 2024-06-11 NOTE — DATA REVIEWED
[de-identified] : Left ankle 3 view radiographs were obtained and independently reviewed during today's visit. There are no signs of fracture, dislocation, bony injury noted. There is hardware in place. Talar dome appears well reduced within ankle mortise. Chronic changes about the foot present.  Prior obtained imaging was once again reviewed and is as noted below. Left foot MRI was obtained at [facility] on 6 6/18/22  Osseous edema throughout the second metatarsal head which is centered about the subarticular surface.  Findings are concerning for prominent osseous stress reaction or developing subchondral fracture.  Additional periphyseal edema about the third metatarsal head also concerning for stress reaction.  Varus alignment of the second toe with superimposed hallux valgus.  The visualized capsular  structures appear intact

## 2024-06-11 NOTE — PHYSICAL EXAM
[Oriented x3] : oriented to person, place, and time [Conjunctiva] : normal conjunctiva [Eyelids] : normal eyelids [Pupils] : pupils were equal and round [Ears] : normal ears [Nose] : normal nose [Lips] : normal lips [Normal] : The patient is in no apparent respiratory distress. They're taking full deep breaths without use of accessory muscles or evidence of audible wheezes or stridor without the use of a stethoscope [Rash] : no rash [Lesions] : no lesions [Ulcers] : no ulcers [FreeTextEntry1] : Pleasant and cooperative with exam, appropriate for age.  Gait: Ambulates without evidence of antalgia.  No toe drag noted. Positive bilateral high riding arches. She is unable to walk on the ball of her left foot due to her history of a calcaneovalgus deformity.  Left foot/ankle:  Skin intact, no warmth, erythema or ecchymosis noted Limited plantar flexion noted with a positive calcaneal valgus deformity noted with dorsiflexion of 40.   No discomfort elicited with palpation over the Achilles tendon, anterior aspect of the ankle or tarsal bones of the foot dorsally.  Mild stiffness with hindfoot range of motion noted.  Positive high arch is noted.  Full sensation to palpation.  4/5 muscle strength with plantar flexion. Surgical scars from previous surgery noted.  Capillary refill less than 2 seconds noted.  +2 pulse pulses palpated and noted.  The ankle joint is stable to stress maneuvers.  Positive calf atrophy noted due to her underlying condition.

## 2024-06-11 NOTE — REASON FOR VISIT
[Follow Up] : a follow up visit [Patient] : patient [Mother] : mother [FreeTextEntry1] : Chronic left ankle pain

## 2024-06-11 NOTE — ASSESSMENT
[FreeTextEntry1] : 18-year-old female who has a history of Cloacal exstrophy, tethered cord, and hip dysplasia with bilateral high arches in her feet and left calcaneovalgus deformity presents today for chronic left ankle pain without injury since January 2024. Patient has had multiple foot procedures at outside institution.  -We discussed the history, physical exam, and all available radiographs at length during today's visit with patient and her parent/guardian who served as an independent historian due to child's age and unreliable nature of history. -Outside documentation was reviewed -Left ankle 3 view radiographs were obtained and independently reviewed during today's visit. There are no signs of fracture, dislocation, bony injury noted. There is hardware in place. Talar dome appears well reduced within ankle mortise. Chronic changes about the foot present. -Clinically, she continues to have discomfort about the left foot and ankle. Recent surgery was performed at outside institution. -At this time, given the involved nature of her condition and multiple prior surgeries at outside institution, recommendation is to continue following up with her index surgeon for management.  Additionally, we again discussed obtaining a second opinion from our adult foot and ankle surgeon, Dr. Che.  Also recommended continued follow-up/management by PM&R/pain management. -She may continue with activity as tolerated. -We will plan to see her back in clinic as-needed basis or if new concerns arise.   All questions and concerns were addressed today. Parent and patient verbalize understanding and agree with plan of care.  I, Dalia Berry, have acted as a scribe and documented the above information for Dr. Soriano.

## 2024-06-11 NOTE — HISTORY OF PRESENT ILLNESS
[FreeTextEntry1] : 18 year old female with PMH remarkable for tethered cord which has had 7 surgeries in the past by Dr. 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 She was treated treated by Dr. Doty for this in the past. As per history, patient has also been treated in the past by Dr. Pichardo for tendon lengthening and transfers in bilateral feet. Dr. Pichardo is not currently practicing at this time so she was referred to Dr. Doty. Patient states she has noticed that her great toe has migrated inward and her hammer toes have progressed. She has had AFOs, SMOs, and other braces in the past that have given her blisters and she refuses to wear them. 2/25/22, Simon reports that she participates in physical therapy 3 times a week due to her underlying condition. She has been experiencing left posterior knee pain for 3 months with occasional clicking. Dr. 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 She also has a history of hip dysplasia which was also treated surgically by Dr. Pichardo.  On May 13, 2022 due to increased left foot and ankle pain.  At that time an MRI of the area was ordered.  She was able to obtain the MRI in June 2022 but was unable to obtain follow-up until today.  She continues to have discomfort about the left ankle.  She is able to bear weight though she has pain.  She followed up with Dr. Pichardo over the summer who recommended surgery of the bilateral feet.  Of note she had a posterior spinal fusion on 11/10/2022 at [facility]. On 1/6/23 She was referred to Dr. Che for continued management of her chronic foot and ankle deformity/pain.  Today, she presents due to continued left ankle pain.  Of note she was seen by Dr. Pichardo and had repeat surgery in May 2023.  She was casted for approximately 6 weeks.  She notes that her ankle pain began in January 2024.  She has been in physical therapy which is only minimally improved her discomfort.  She was seen by a pain management specialist and started on methocarbamol.  She presents today for evaluation of her left ankle pain.

## 2024-06-11 NOTE — REVIEW OF SYSTEMS
[Change in Activity] : change in activity [Joint Pains] : arthralgias [Fever Above 102] : no fever [Malaise] : no malaise [Rash] : no rash [Nasal Stuffiness] : no nasal congestion [Wheezing] : no wheezing [Cough] : no cough [Vomiting] : no vomiting [Constipation] : no constipation [Limping] : no limping [Joint Swelling] : no joint swelling [Sleep Disturbances] : ~T no sleep disturbances

## 2024-06-12 PROBLEM — Q65.89 HIP DYSPLASIA, CONGENITAL: Status: ACTIVE | Noted: 2023-01-20

## 2024-06-12 NOTE — ASSESSMENT
[FreeTextEntry1] : 18-year-old female who has a history of Cloacal exstrophy, tethered cord, and hip dysplasia with bilateral high arches in her feet and left calcaneovalgus deformity presents today for right hip pain x 1 year  We reviewed Simon's physical exam today along with her recent imaging which included plain films of the lumbar spine and pelvis as well as CT imaging of the lumbar spine and pelvis.  Because of her underlying condition and abnormal anatomy, I would like to further investigate the area of discomfort with an MRI of the pelvis.  Will plan to see her back in office following MRI to discuss the results. This plan was discussed with family and all questions and concerns were addressed today.  Also upon follow-up, I would like to get a full-length EOS x-ray of her spine and lower extremities to assess global alignment.  We spent 31 minutes on HPI, Clinical exam, ordering/ reviewing all imaging, reviewing any existing record, reviewing findings and counseling patient to treatment, differentials, etiology, prognosis, natural history, implications on ADLs, activities limitations/modifications, genetics, answering questions and addressing concerns, treatment goals and documenting in the EHR.   I, Lisbeth Cope PA-C, have acted as a scribe and documented the above for Dr. Morfin  The above documentation completed by the scribe is an accurate record of both my words and actions.

## 2024-06-12 NOTE — HISTORY OF PRESENT ILLNESS
[FreeTextEntry1] :  18 year old female with PMH remarkable for cloacal extrophy and tethered cord for which has had 7 surgeries in the past by Dr. Vargas (NSGY at [facility]), brought in by her mother for right low back/hip pain.   As per history, patient has been treated in the past by Dr. Pichardo for tendon lengthening and transfers in bilateral feet. She also has a history of hip dysplasia which was also treated surgically by Dr. Pichardo.  Dr. Pichardo is not currently practicing and at some point, she was referred to Dr. Doty. She had a posterior spinal fusion on 11/10/2022 at [facility] by Dr. Vargas and Dr. Keith. She has also seen My partner Dr. Soriano in March 2024 for left ankle pain. Most recently, she saw Dr. Jose Lpóez 6/10/24 for her current complaint of low back pain/right hip pain, who referred her to us for evaluation.. She has had AFOs, SMOs, and other braces in the past that have given her blisters and she refuses to wear them. Simon reports that she participates in physical therapy 3 times a week due to her underlying condition.    She explained that she has been experiencing right low back/right hip pain for the last year or so.  This started about 6 months after her posterior spinal fusion surgery.  No other injury or trauma reported.  She follows with pain management and will be seeing them later today.  She takes Celebrex and methocarbamol for her discomfort.  She has had recent imaging lower back and pelvis, including CT of the lumbar spine and pelvis.  Typically her pain does not radiate but today she actually woke up with discomfort that is radiating down the back of her right thigh to just above her knee.  They are here today for further orthopedic care.

## 2024-06-12 NOTE — DATA REVIEWED
[de-identified] : CT imaging of the pelvis and lumbar spine in [employer] system from 2024 reviewed in office today.  Plain radiographs of the lumbar spine from April 2024 in [employer] system reviewed today.   Plain radiographs of the pelvis 6/20/24 reviewed in [facility] system.  No new images taken today.

## 2024-06-12 NOTE — REASON FOR VISIT
[Initial Evaluation] : an initial evaluation [Patient] : patient [Mother] : mother [FreeTextEntry1] : low back/hip pain

## 2024-06-12 NOTE — PHYSICAL EXAM
[FreeTextEntry1] : Healthy appearing 18 year-old child. Awake, alert, in no acute distress. Pleasant and cooperative.  Eyes are clear with no sclera abnormalities. External ears, nose and mouth are clear.  Good respiratory effort with no audible wheezing without use of a stethoscope. Ambulates independently.  No toe drag noted. Positive bilateral high riding arches. She is unable to walk on the ball of her left foot due to her history of a calcaneovalgus deformity. Able to get on and off exam table without difficulty.   Spine: surgical incisions are well healed.  TTP right sacral region

## 2024-06-13 NOTE — PHYSICAL EXAM
[de-identified] : Constitutional:   - No acute distress   Neurological:   - normal mood and affect   - alert and oriented x 3     Lumbar Spine Exam:   Inspection: erythema (-)  ecchymosis (-)  rashes (-)  alignment: lumbar scoliosis  Well healed surgical scar midline  Palpation:  Midline lumbar tenderness:            (-)  midline thoracic tenderness:          (-)  Lumbar paraspinal tenderness:  L (+) ; R (+)  thoracic paraspinal tenderness: L (-) ; R (-)  sciatic nerve tenderness :          L (-) ; R (-)   ROM: WNL  Strength:   grossly intact  Special Tests:  SLR:                            R (-) ; L (-)  Facet loading:             R (-) ; L (-)   Neurologic:  SILT throughout right lower extremity  SILT throughout left lower extremity   Gait:   mildly antalgic gait  ambulates without assistive device

## 2024-06-13 NOTE — HISTORY OF PRESENT ILLNESS
[FreeTextEntry1] : 6/11/2024 - Patient presents for FUV regarding their lower back pain.   4/11/2024 - Patient presents for 4-month FUV regarding their lower back pain.  She has had her typical baseline pain since her last visit which waxes and wanes in severity, she has been using methocarbamol PRN with meaningful benefit.  Was prescribed Celebrex by her orthopedic surgeon however has not yet started.  Reports having undergone a trigger point injection at her chiropractor's office however had an adverse reaction.  She is unsure if it is an allergy.  12/7/2023- Patient presents for FUV regarding their lower back pain.  She is accompanied by her mother.  Patient states she has had her typical baseline pain since her last visit which waxes and wanes in severity however is experiencing a significant worsening over the past couple days.  Her pain is still across the back (R>L) with no radiation to the lower extremities; she is still in PT and tried taking cyclobenzaprine 5mg twice a day with no meaningful benefit.  Had recent visit with her neurosurgeon; they state that most recent imaging does not report any changes.  9/28/2023- The patient presents for initial evaluation regarding their low back pain.   She is accompanied by her mother.  Patient with a very complex medical history requiring multiple various surgical interventions (70+ reported).   Patient's presenting pain complaint today is regarding her low back pain.  Pain is predominately right sided without radiation to the buttock or lower extremity.  She underwent a T11-L3 posterior spinal fusion on 11/10/2023 (Dr. Cristiano Vargas); she reports having pain since that time.  The patient has had a history of multiple spinal surgeries for tethered cord throughout the years.  Currently, she takes Tylenol, Ibuprofen, and Flexeril PRN for pain management with varying degrees of benefit; gastric ulcers history requires caution with NSAIDs. Patient is currently involved in PT with meaningful benefit.   Injections:  1) TPI  Pertinent Surgical History: 1) T11-L3 Posterior Spinal Fusion (11/10/2022) - Dr. Edwin Vargas  Physician Disclaimer: I have personally reviewed and confirmed all HPI data with the patient.

## 2024-06-26 ENCOUNTER — APPOINTMENT (OUTPATIENT)
Dept: PEDIATRIC HEMATOLOGY/ONCOLOGY | Facility: CLINIC | Age: 19
End: 2024-06-26

## 2024-07-02 ENCOUNTER — APPOINTMENT (OUTPATIENT)
Dept: PEDIATRICS | Facility: CLINIC | Age: 19
End: 2024-07-02
Payer: COMMERCIAL

## 2024-07-02 VITALS — TEMPERATURE: 98 F | WEIGHT: 110 LBS

## 2024-07-02 DIAGNOSIS — Z87.2 PERSONAL HISTORY OF DISEASES OF THE SKIN AND SUBCUTANEOUS TISSUE: ICD-10-CM

## 2024-07-02 DIAGNOSIS — L01.00 IMPETIGO, UNSPECIFIED: ICD-10-CM

## 2024-07-02 PROCEDURE — 99214 OFFICE O/P EST MOD 30 MIN: CPT

## 2024-07-02 RX ORDER — MUPIROCIN 20 MG/G
2 OINTMENT TOPICAL 3 TIMES DAILY
Qty: 1 | Refills: 3 | Status: ACTIVE | COMMUNITY
Start: 2024-07-02 | End: 1900-01-01

## 2024-07-02 RX ORDER — CEFADROXIL 500 MG/1
500 CAPSULE ORAL
Qty: 20 | Refills: 0 | Status: ACTIVE | COMMUNITY
Start: 2024-07-02 | End: 1900-01-01

## 2024-07-06 ENCOUNTER — OUTPATIENT (OUTPATIENT)
Dept: OUTPATIENT SERVICES | Facility: HOSPITAL | Age: 19
LOS: 1 days | End: 2024-07-06
Payer: COMMERCIAL

## 2024-07-06 ENCOUNTER — APPOINTMENT (OUTPATIENT)
Dept: MRI IMAGING | Facility: CLINIC | Age: 19
End: 2024-07-06
Payer: COMMERCIAL

## 2024-07-06 DIAGNOSIS — M95.5 ACQUIRED DEFORMITY OF PELVIS: Chronic | ICD-10-CM

## 2024-07-06 DIAGNOSIS — Q45.8 OTHER SPECIFIED CONGENITAL MALFORMATIONS OF DIGESTIVE SYSTEM: Chronic | ICD-10-CM

## 2024-07-06 DIAGNOSIS — N21.0 CALCULUS IN BLADDER: Chronic | ICD-10-CM

## 2024-07-06 DIAGNOSIS — Q65.89 OTHER SPECIFIED CONGENITAL DEFORMITIES OF HIP: ICD-10-CM

## 2024-07-06 DIAGNOSIS — Q64.12 CLOACAL EXSTROPHY OF URINARY BLADDER: Chronic | ICD-10-CM

## 2024-07-06 DIAGNOSIS — Q42.3 CONGENITAL ABSENCE, ATRESIA AND STENOSIS OF ANUS WITHOUT FISTULA: Chronic | ICD-10-CM

## 2024-07-06 DIAGNOSIS — Q06.9 CONGENITAL MALFORMATION OF SPINAL CORD, UNSPECIFIED: Chronic | ICD-10-CM

## 2024-07-06 DIAGNOSIS — Q45.8 OTHER SPECIFIED CONGENITAL MALFORMATIONS OF DIGESTIVE SYSTEM: ICD-10-CM

## 2024-07-06 DIAGNOSIS — Z98.89 OTHER SPECIFIED POSTPROCEDURAL STATES: Chronic | ICD-10-CM

## 2024-07-06 DIAGNOSIS — Z98.890 OTHER SPECIFIED POSTPROCEDURAL STATES: Chronic | ICD-10-CM

## 2024-07-06 DIAGNOSIS — T14.90 INJURY, UNSPECIFIED: Chronic | ICD-10-CM

## 2024-07-06 DIAGNOSIS — M20.60 ACQUIRED DEFORMITIES OF TOE(S), UNSPECIFIED, UNSPECIFIED FOOT: Chronic | ICD-10-CM

## 2024-07-06 DIAGNOSIS — N32.2 VESICAL FISTULA, NOT ELSEWHERE CLASSIFIED: Chronic | ICD-10-CM

## 2024-07-06 DIAGNOSIS — D22.9 MELANOCYTIC NEVI, UNSPECIFIED: Chronic | ICD-10-CM

## 2024-07-06 DIAGNOSIS — Q06.8 OTHER SPECIFIED CONGENITAL MALFORMATIONS OF SPINAL CORD: Chronic | ICD-10-CM

## 2024-07-06 DIAGNOSIS — N35.9 URETHRAL STRICTURE, UNSPECIFIED: Chronic | ICD-10-CM

## 2024-07-06 PROCEDURE — 72195 MRI PELVIS W/O DYE: CPT | Mod: 26

## 2024-07-06 PROCEDURE — 72195 MRI PELVIS W/O DYE: CPT

## 2024-07-12 ENCOUNTER — TRANSCRIPTION ENCOUNTER (OUTPATIENT)
Age: 19
End: 2024-07-12

## 2024-07-30 ENCOUNTER — APPOINTMENT (OUTPATIENT)
Dept: PEDIATRIC ORTHOPEDIC SURGERY | Facility: CLINIC | Age: 19
End: 2024-07-30
Payer: COMMERCIAL

## 2024-07-30 DIAGNOSIS — Q45.8 OTHER SPECIFIED CONGENITAL MALFORMATIONS OF DIGESTIVE SYSTEM: ICD-10-CM

## 2024-07-30 DIAGNOSIS — Q65.89 OTHER SPECIFIED CONGENITAL DEFORMITIES OF HIP: ICD-10-CM

## 2024-07-30 DIAGNOSIS — Q64.12 OTHER SPECIFIED CONGENITAL MALFORMATIONS OF DIGESTIVE SYSTEM: ICD-10-CM

## 2024-07-30 PROCEDURE — 99214 OFFICE O/P EST MOD 30 MIN: CPT

## 2024-07-30 NOTE — HISTORY OF PRESENT ILLNESS
[FreeTextEntry1] :  18 year old female with PMH remarkable for cloacal extrophy and tethered cord for which has had 7 surgeries in the past by Dr. Vargas (NSGY at [facility]), brought in by her mother for right low back/hip pain.  As per history, patient has been treated in the past by Dr. Pichardo for tendon lengthening and transfers in bilateral feet. She also has a history of hip dysplasia which was also treated surgically by Dr. Pichardo.  Dr. Pichardo is not currently practicing and at some point, she was referred to Dr. Doty. She had a posterior spinal fusion on 11/10/2022 at [facility] by Dr. Vargas and Dr. Keith. She has also seen My partner Dr. Soriano in March 2024 for left ankle pain. Most recently, she saw Dr. Jose López 6/10/24 for her current complaint of low back pain/right hip pain, who referred her to us for evaluation.. She has had AFOs, SMOs, and other braces in the past that have given her blisters and she refuses to wear them. Simon reports that she participates in physical therapy 3 times a week due to her underlying condition.    On initial evaluation in our clinic she explained that she has been experiencing right low back/right hip pain for the last year or so.  This started about 6 months after her posterior spinal fusion surgery.  No other injury or trauma reported.  She follows with pain management.  She takes Celebrex, methocarbamol, and oxycodone for her discomfort.  On initial evaluation an MRI of the pelvis was ordered.   Today, she continues to have daily right hip/back pain. She reports that is has not improved from initial evaluation. She continues to take pain medication every few hours. She presents today for continued management of the above.

## 2024-07-30 NOTE — DATA REVIEWED
[de-identified] : My review and interpretation of the radiologic studies: MRI Pelvis was obtained at [facility] imaging was obtained on 7/6/24 and reviewed today: Acetabular dysplasia bilaterally, greater on the right as outlined above. Redemonstration of a dysplastic appearing pelvis and sacrum. Unchanged diastases of the pubic symphysis. 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 Hardware in place about the back without radiographic evidence of hardware complications. Bilateral hip dysplasia noted.   Prior obtained imaging was once again reviewed and is as noted below. CT imaging of the pelvis and lumbar spine in [employer] system from 2024 reviewed in office today.  Plain radiographs of the lumbar spine from April 2024 in [employer] system reviewed today.   Plain radiographs of the pelvis 6/20/24 reviewed in [employer] system.

## 2024-07-30 NOTE — REASON FOR VISIT
[Follow Up] : a follow up visit [Patient] : patient [Mother] : mother [FreeTextEntry1] : low back/hip pain

## 2024-07-30 NOTE — ASSESSMENT
[FreeTextEntry1] : 18-year-old female who has a history of Cloacal exstrophy, tethered cord, and hip dysplasia with bilateral high arches in her feet and left calcaneovalgus deformity presents today for right hip pain x 1 year  We reviewed Simon's physical exam today along with her recent imaging of full length radiographs and her pelvis MRI.  Clinically, she continues to have significant back and hip discomfort requiring around the clock pain medication. Based on clinical examination and imaging reviewed today recommendation at this time is to complete a course of PT focused on modalities such as deep tissue massage and laser treatments. She can continue with activities to tolerance. We will plan to see her back in office in 2-3 months for repeat clinical evaluation. If her pain is not improved at that time operative interventions will be discussed.  We spent 30 minutes on HPI, Clinical exam, ordering/ reviewing all imaging, reviewing any existing record, reviewing findings and counseling patient to treatment, differentials, etiology, prognosis, natural history, implications on ADLs, activities limitations/modifications, genetics, answering questions and addressing concerns, treatment goals and documenting in the EHR.  All questions and concerns were addressed today. Parent and patient verbalize understanding and agree with plan of care.  I, Dalia Berry, have acted as a scribe and documented the above information for Dr. Morfin  The above documentation completed by the scribe is an accurate record of both my words and actions.

## 2024-07-30 NOTE — REVIEW OF SYSTEMS
[Change in Activity] : change in activity [Joint Pains] : arthralgias [No Acute Changes] : No acute changes since previous visit [Fever Above 102] : no fever [Malaise] : no malaise [Rash] : no rash [Nasal Stuffiness] : no nasal congestion [Wheezing] : no wheezing [Cough] : no cough [Vomiting] : no vomiting [Constipation] : no constipation [Limping] : no limping [Joint Swelling] : no joint swelling [Sleep Disturbances] : ~T no sleep disturbances

## 2024-07-30 NOTE — PHYSICAL EXAM
[FreeTextEntry1] : Healthy appearing 18 year-old female. Awake, alert, in no acute distress. Pleasant and cooperative.  Eyes are clear with no sclera abnormalities. External ears, nose and mouth are clear.  Good respiratory effort with no audible wheezing without use of a stethoscope. Ambulates independently.  No toe drag noted. Positive bilateral high riding arches. She is unable to walk on the ball of her left foot due to her history of a calcaneovalgus deformity. Able to get on and off exam table without difficulty.   Spine: surgical incisions are well healed.  TTP right sacral region

## 2024-08-22 ENCOUNTER — APPOINTMENT (OUTPATIENT)
Dept: DERMATOLOGY | Facility: CLINIC | Age: 19
End: 2024-08-22

## 2024-09-04 ENCOUNTER — APPOINTMENT (OUTPATIENT)
Dept: MRI IMAGING | Facility: CLINIC | Age: 19
End: 2024-09-04

## 2024-09-04 ENCOUNTER — OUTPATIENT (OUTPATIENT)
Dept: OUTPATIENT SERVICES | Facility: HOSPITAL | Age: 19
LOS: 1 days | End: 2024-09-04

## 2024-09-04 ENCOUNTER — OUTPATIENT (OUTPATIENT)
Dept: OUTPATIENT SERVICES | Facility: HOSPITAL | Age: 19
LOS: 1 days | End: 2024-09-04
Payer: COMMERCIAL

## 2024-09-04 DIAGNOSIS — M95.5 ACQUIRED DEFORMITY OF PELVIS: Chronic | ICD-10-CM

## 2024-09-04 DIAGNOSIS — Q06.9 CONGENITAL MALFORMATION OF SPINAL CORD, UNSPECIFIED: Chronic | ICD-10-CM

## 2024-09-04 DIAGNOSIS — Z98.89 OTHER SPECIFIED POSTPROCEDURAL STATES: Chronic | ICD-10-CM

## 2024-09-04 DIAGNOSIS — M20.60 ACQUIRED DEFORMITIES OF TOE(S), UNSPECIFIED, UNSPECIFIED FOOT: Chronic | ICD-10-CM

## 2024-09-04 DIAGNOSIS — Q45.8 OTHER SPECIFIED CONGENITAL MALFORMATIONS OF DIGESTIVE SYSTEM: Chronic | ICD-10-CM

## 2024-09-04 DIAGNOSIS — Q42.3 CONGENITAL ABSENCE, ATRESIA AND STENOSIS OF ANUS WITHOUT FISTULA: Chronic | ICD-10-CM

## 2024-09-04 DIAGNOSIS — T14.90 INJURY, UNSPECIFIED: Chronic | ICD-10-CM

## 2024-09-04 DIAGNOSIS — Q06.8 OTHER SPECIFIED CONGENITAL MALFORMATIONS OF SPINAL CORD: Chronic | ICD-10-CM

## 2024-09-04 DIAGNOSIS — Q64.12 CLOACAL EXSTROPHY OF URINARY BLADDER: Chronic | ICD-10-CM

## 2024-09-04 DIAGNOSIS — D22.9 MELANOCYTIC NEVI, UNSPECIFIED: Chronic | ICD-10-CM

## 2024-09-04 DIAGNOSIS — N21.0 CALCULUS IN BLADDER: Chronic | ICD-10-CM

## 2024-09-04 DIAGNOSIS — Z98.890 OTHER SPECIFIED POSTPROCEDURAL STATES: Chronic | ICD-10-CM

## 2024-09-04 DIAGNOSIS — M54.9 DORSALGIA, UNSPECIFIED: ICD-10-CM

## 2024-09-04 DIAGNOSIS — Z00.8 ENCOUNTER FOR OTHER GENERAL EXAMINATION: ICD-10-CM

## 2024-09-04 DIAGNOSIS — N32.2 VESICAL FISTULA, NOT ELSEWHERE CLASSIFIED: Chronic | ICD-10-CM

## 2024-09-04 DIAGNOSIS — N35.9 URETHRAL STRICTURE, UNSPECIFIED: Chronic | ICD-10-CM

## 2024-09-04 PROCEDURE — 73721 MRI JNT OF LWR EXTRE W/O DYE: CPT | Mod: 26,LT

## 2024-09-06 ENCOUNTER — APPOINTMENT (OUTPATIENT)
Dept: ORTHOPEDIC SURGERY | Facility: CLINIC | Age: 19
End: 2024-09-06
Payer: COMMERCIAL

## 2024-09-06 DIAGNOSIS — R93.7 ABNORMAL FINDINGS ON DIAGNOSTIC IMAGING OF OTHER PARTS OF MUSCULOSKELETAL SYSTEM: ICD-10-CM

## 2024-09-06 DIAGNOSIS — M79.672 PAIN IN LEFT FOOT: ICD-10-CM

## 2024-09-06 DIAGNOSIS — Q66.40 CONGEN TALIPES CALCANEOVALGUS, UNSP FOOT: ICD-10-CM

## 2024-09-06 PROCEDURE — 99214 OFFICE O/P EST MOD 30 MIN: CPT

## 2024-09-06 NOTE — PHYSICAL EXAM
[de-identified] : Left foot Physical Examination:   General: Alert and oriented x3.  In no acute distress.  Pleasant in nature with a normal affect.  No apparent respiratory distress. Erythema, Warmth, Rubor: Negative Swelling: Negative  +lateral gutter   ROM Ankle: 1. Dorsiflexion: 20 degrees 2. Plantarflexion: 30 degrees 3. Inversion: 20 degrees 4. Eversion: 20 degrees   ROM of digits: Normal Pes Planus: Negative Pes Cavus: Negative   Bunion: Negative Tailor's Bunion (Bunionette): Negative Hammer Toe Deformity/Deformities: Negative   Tenderness to Palpation: 1. Heel Pain: Negative 2. Midfoot Pain: Negative 3. First MTP Joint: Negative 4. Lis Franc Joint: Negative 5. Lateral Malleolus: Negative 6. Medial Malleolus: Negative   Tenderness Metatarsals: 1st MT: Negative 2nd MT: Negative 3rd MT: Negative 4th MT: Negative 5th MT: Negative Base of the 5th MT: Negative   Tendon Pain: 1. Posterior Tibialis: Negative 2. Peroneus Brevis/Longus: Negative   Ligament Pain: 1. Lis Franc Ligament: Negative 2. Plantar Fascia Ligament: Negative 3. ATFL/CFL: + 4. Deltoid Ligaments: Negative 5. PTFL: Negative   Stability: 1. Anterior Drawer: Negative 2. Posterior Drawer: Negative   Strength: 5/5 TA/GS/EHL/FHL/EDL/ADD/ABD   Pulses: 2+ DP/PT Pulses   Capillary Refill Toes: <2 seconds   Neuro: Intact motor and sensory throughout   Additional Test: 1. Torre's Squeeze Test: Negative 2. Calcaneal Squeeze Test: Negative 3. Venegas Test: Negative 4. Tarsal Tunnel Tinel's Sign (Posterior Tibial Nerve Impingement): Negative 5. Single Heel Rise: Negative  [de-identified] : MRI of the left ankle obtained from another [facility] facility and reviewed in the office today, 09/06/2024, revealed: no fracture. Records could not be accessed in the office today.

## 2024-09-06 NOTE — ADDENDUM
[FreeTextEntry1] : I, Simon Estrada, acted solely as a scribe for Dr. Michael Che on this date 09/06/2024.   All medical record entries made by the Scribe were at my, Dr. Michael Che, direction and personally dictated by me on 09/06/2024. I have reviewed the chart and agree that the record accurately reflects my personal performance of the history, physical exam, assessment and plan. I have also personally directed, reviewed, and agreed with the chart.

## 2024-09-06 NOTE — DISCUSSION/SUMMARY
[de-identified] : Today I had a lengthy discussion with the patient regarding their left foot pain. I have addressed all the patient's concerns surrounding the pathology of their condition. I have reviewed the patient's MRI results with them in great detail.  I would like the patient to pursue conservative management, which was discussed in great detail.    Plan:  1. I recommended that the patient utilize an ASO brace. The patient was fitted for the ASO brace in the office today. 2. A discussion was had about shoe-wear modifications. I advised the patient to utilize a wide toed cross training sneaker that better accommodates the feet. I recommended New Balance, [employer], [employer], or [employer] to the patient.  f/u prn   The patient understood and verbally agreed to the treatment plan. All of their questions were answered, and they were satisfied with the visit. The patient should call the office if they have any questions or experience worsening symptoms.

## 2024-09-06 NOTE — HISTORY OF PRESENT ILLNESS
[FreeTextEntry1] : 09/06/2024: GERI AKINS is a 19 year old female presenting to the office for a follow up evaluation of her left foot pain, new issue. Her pain has been ongoing for the past few weeks. The patient cannot attribute their pain to any new injury, fall, or trauma. She has been attending physical therapy. She is referred here by her podiatrist, Dr. Rodrigues. She endorses pain in the anterior aspect of her ankle. She is using celebrex. The patient presents to the office in sneakers and ambulating without assistance.  01/09/2023: 17 year old female accompanied by her mother presenting for consultation of B/L foot surgery, referred by Dr. Ramiro Soriano. Her mother reports congenital defects causing chronic health issues. She currently has left ankle pain. She reports left ankle fx about 1 year ago. She has extensive surgical history, most recent being tethered cord spinal fusion on 11/10/2022. Taking Tylenol daily, unable to take NSAIDs s/p spinal fusion. The patient has worn multiple orthotics previously, but reports discomfort and blister formation. She is wearing sneakers and ambulating without assistive devices.  No other complaints at this time.

## 2024-09-10 NOTE — ED PROVIDER NOTE - ATTENDING CONTRIBUTION TO CARE
No
I have obtained patient's history, performed physical exam and formulated management plan.   Parker Carroll

## 2024-09-26 ENCOUNTER — RX RENEWAL (OUTPATIENT)
Age: 19
End: 2024-09-26

## 2024-09-26 ENCOUNTER — TRANSCRIPTION ENCOUNTER (OUTPATIENT)
Age: 19
End: 2024-09-26

## 2024-10-01 ENCOUNTER — APPOINTMENT (OUTPATIENT)
Dept: PEDIATRICS | Facility: CLINIC | Age: 19
End: 2024-10-01
Payer: COMMERCIAL

## 2024-10-01 VITALS — SYSTOLIC BLOOD PRESSURE: 96 MMHG | TEMPERATURE: 97.2 F | DIASTOLIC BLOOD PRESSURE: 70 MMHG | WEIGHT: 104 LBS

## 2024-10-01 DIAGNOSIS — B34.9 VIRAL INFECTION, UNSPECIFIED: ICD-10-CM

## 2024-10-01 DIAGNOSIS — J02.9 ACUTE PHARYNGITIS, UNSPECIFIED: ICD-10-CM

## 2024-10-01 PROBLEM — R19.7 DIARRHEA: Status: ACTIVE | Noted: 2024-10-01

## 2024-10-01 LAB — S PYO AG SPEC QL IA: NEGATIVE

## 2024-10-01 PROCEDURE — 99213 OFFICE O/P EST LOW 20 MIN: CPT | Mod: 25

## 2024-10-01 PROCEDURE — 87880 STREP A ASSAY W/OPTIC: CPT | Mod: QW

## 2024-10-01 NOTE — HISTORY OF PRESENT ILLNESS
[de-identified] : pt c/o sinus pain,  headache this morning and sore throat yesterday,  pt with colostomy bag and with stomach gurgling,  mom has sample and inquiring about a script for c-diff to bring to the lab.   [FreeTextEntry6] : Headache, congestion and sore throat x 3 days, taking Mucinex, did not help, taking OTC antihistamine Afebrile, Also having increased volume of more watery output in colostomy bag and increased stomach gurgling, no abdominal pain, no vomiting, mother concerned due to history of Cdiff, requesting lab rx and will bring specimen to lab herself No cough, SOB, difficulty breathing Eating and drinking well, good urine output

## 2024-10-01 NOTE — PHYSICAL EXAM
[Erythematous Oropharynx] : erythematous oropharynx [NL] : warm, clear [Erythema] : no erythema [Enlarged Tonsils] : tonsils not enlarged [Vesicles] : no vesicles [Exudate] : no exudate [Ulcerative Lesions] : no ulcerative lesions [Palate petechiae] : palate without petechiae [Wheezing] : no wheezing [Rales] : no rales [Tachypnea] : no tachypnea [Rhonchi] : no rhonchi [FreeTextEntry9] : ostomy bag in place with light brown liquid, vesicostomy bag in place containing urine

## 2024-10-01 NOTE — REVIEW OF SYSTEMS
[Negative] : Heme/Lymph [Headache] : headache [Nasal Congestion] : nasal congestion [Sinus Pressure] : sinus pressure [Fever] : no fever

## 2024-10-04 LAB
BACTERIA STL CULT: NORMAL
CDIFF BY PCR: NOT DETECTED
DEPRECATED O AND P PREP STL: NORMAL

## 2024-10-07 ENCOUNTER — APPOINTMENT (OUTPATIENT)
Dept: PEDIATRIC GASTROENTEROLOGY | Facility: CLINIC | Age: 19
End: 2024-10-07
Payer: COMMERCIAL

## 2024-10-07 VITALS
SYSTOLIC BLOOD PRESSURE: 102 MMHG | WEIGHT: 112.88 LBS | HEART RATE: 75 BPM | HEIGHT: 57.72 IN | BODY MASS INDEX: 23.69 KG/M2 | DIASTOLIC BLOOD PRESSURE: 64 MMHG

## 2024-10-07 DIAGNOSIS — Z93.3 COLOSTOMY STATUS: ICD-10-CM

## 2024-10-07 DIAGNOSIS — E61.1 IRON DEFICIENCY: ICD-10-CM

## 2024-10-07 DIAGNOSIS — K63.3 ULCER OF INTESTINE: ICD-10-CM

## 2024-10-07 DIAGNOSIS — E55.9 VITAMIN D DEFICIENCY, UNSPECIFIED: ICD-10-CM

## 2024-10-07 DIAGNOSIS — R19.7 DIARRHEA, UNSPECIFIED: ICD-10-CM

## 2024-10-07 DIAGNOSIS — E53.8 DEFICIENCY OF OTHER SPECIFIED B GROUP VITAMINS: ICD-10-CM

## 2024-10-07 DIAGNOSIS — Q45.8 OTHER SPECIFIED CONGENITAL MALFORMATIONS OF DIGESTIVE SYSTEM: ICD-10-CM

## 2024-10-07 DIAGNOSIS — Q64.12 OTHER SPECIFIED CONGENITAL MALFORMATIONS OF DIGESTIVE SYSTEM: ICD-10-CM

## 2024-10-07 DIAGNOSIS — K92.2 GASTROINTESTINAL HEMORRHAGE, UNSPECIFIED: ICD-10-CM

## 2024-10-07 PROCEDURE — G2211 COMPLEX E/M VISIT ADD ON: CPT | Mod: NC

## 2024-10-07 PROCEDURE — 99215 OFFICE O/P EST HI 40 MIN: CPT

## 2024-10-07 PROCEDURE — 99417 PROLNG OP E/M EACH 15 MIN: CPT

## 2024-10-09 ENCOUNTER — NON-APPOINTMENT (OUTPATIENT)
Age: 19
End: 2024-10-09

## 2024-10-09 DIAGNOSIS — K90.822 SHORT BOWEL SYNDROME WITHOUT COLON IN CONTINUITY: ICD-10-CM

## 2024-10-09 DIAGNOSIS — K92.2 GASTROINTESTINAL HEMORRHAGE, UNSPECIFIED: ICD-10-CM

## 2024-10-09 DIAGNOSIS — K52.9 NONINFECTIVE GASTROENTERITIS AND COLITIS, UNSPECIFIED: ICD-10-CM

## 2024-10-09 DIAGNOSIS — K59.89 OTHER SPECIFIED FUNCTIONAL INTESTINAL DISORDERS: ICD-10-CM

## 2024-10-09 LAB
ALBUMIN SERPL ELPH-MCNC: 4 G/DL
ALP BLD-CCNC: 90 U/L
ALT SERPL-CCNC: 32 U/L
ANION GAP SERPL CALC-SCNC: 12 MMOL/L
AST SERPL-CCNC: 21 U/L
BILIRUB SERPL-MCNC: 0.2 MG/DL
BUN SERPL-MCNC: 11 MG/DL
CALCIUM SERPL-MCNC: 9.2 MG/DL
CHLORIDE SERPL-SCNC: 105 MMOL/L
CO2 SERPL-SCNC: 21 MMOL/L
CREAT SERPL-MCNC: 0.65 MG/DL
CRP SERPL-MCNC: 20 MG/L
EGFR: 129 ML/MIN/1.73M2
FERRITIN SERPL-MCNC: 46 NG/ML
GLUCOSE SERPL-MCNC: 125 MG/DL
HCT VFR BLD CALC: 36.3 %
HGB BLD-MCNC: 11.3 G/DL
IRON SATN MFR SERPL: 12 %
IRON SERPL-MCNC: 44 UG/DL
MCHC RBC-ENTMCNC: 30.6 PG
MCHC RBC-ENTMCNC: 31.1 GM/DL
MCV RBC AUTO: 98.4 FL
PLATELET # BLD AUTO: 353 K/UL
POTASSIUM SERPL-SCNC: 3.7 MMOL/L
PROT SERPL-MCNC: 6.5 G/DL
RBC # BLD: 3.69 M/UL
RBC # FLD: 13.1 %
SODIUM SERPL-SCNC: 137 MMOL/L
TIBC SERPL-MCNC: 370 UG/DL
UIBC SERPL-MCNC: 327 UG/DL
WBC # FLD AUTO: 5.29 K/UL

## 2024-10-15 ENCOUNTER — NON-APPOINTMENT (OUTPATIENT)
Age: 19
End: 2024-10-15

## 2024-10-15 LAB — CALPROTECTIN FECAL: 90 UG/G

## 2024-10-18 ENCOUNTER — APPOINTMENT (OUTPATIENT)
Dept: ORTHOPEDIC SURGERY | Facility: CLINIC | Age: 19
End: 2024-10-18
Payer: COMMERCIAL

## 2024-10-18 DIAGNOSIS — M25.572 PAIN IN LEFT ANKLE AND JOINTS OF LEFT FOOT: ICD-10-CM

## 2024-10-18 DIAGNOSIS — Q66.70 CONGEN PES CAVUS, UNSP FOOT: ICD-10-CM

## 2024-10-18 PROCEDURE — 99213 OFFICE O/P EST LOW 20 MIN: CPT

## 2024-11-11 ENCOUNTER — APPOINTMENT (OUTPATIENT)
Dept: CT IMAGING | Facility: CLINIC | Age: 19
End: 2024-11-11
Payer: COMMERCIAL

## 2024-11-11 ENCOUNTER — OUTPATIENT (OUTPATIENT)
Dept: OUTPATIENT SERVICES | Facility: HOSPITAL | Age: 19
LOS: 1 days | End: 2024-11-11
Payer: COMMERCIAL

## 2024-11-11 DIAGNOSIS — Q06.9 CONGENITAL MALFORMATION OF SPINAL CORD, UNSPECIFIED: Chronic | ICD-10-CM

## 2024-11-11 DIAGNOSIS — M95.5 ACQUIRED DEFORMITY OF PELVIS: Chronic | ICD-10-CM

## 2024-11-11 DIAGNOSIS — Q45.8 OTHER SPECIFIED CONGENITAL MALFORMATIONS OF DIGESTIVE SYSTEM: Chronic | ICD-10-CM

## 2024-11-11 DIAGNOSIS — Z98.89 OTHER SPECIFIED POSTPROCEDURAL STATES: Chronic | ICD-10-CM

## 2024-11-11 DIAGNOSIS — Q06.8 OTHER SPECIFIED CONGENITAL MALFORMATIONS OF SPINAL CORD: Chronic | ICD-10-CM

## 2024-11-11 DIAGNOSIS — N35.9 URETHRAL STRICTURE, UNSPECIFIED: Chronic | ICD-10-CM

## 2024-11-11 DIAGNOSIS — T14.90 INJURY, UNSPECIFIED: Chronic | ICD-10-CM

## 2024-11-11 DIAGNOSIS — M20.60 ACQUIRED DEFORMITIES OF TOE(S), UNSPECIFIED, UNSPECIFIED FOOT: Chronic | ICD-10-CM

## 2024-11-11 DIAGNOSIS — N32.2 VESICAL FISTULA, NOT ELSEWHERE CLASSIFIED: Chronic | ICD-10-CM

## 2024-11-11 DIAGNOSIS — Q64.12 CLOACAL EXSTROPHY OF URINARY BLADDER: Chronic | ICD-10-CM

## 2024-11-11 DIAGNOSIS — Q42.3 CONGENITAL ABSENCE, ATRESIA AND STENOSIS OF ANUS WITHOUT FISTULA: Chronic | ICD-10-CM

## 2024-11-11 DIAGNOSIS — M54.50 LOW BACK PAIN, UNSPECIFIED: ICD-10-CM

## 2024-11-11 DIAGNOSIS — Z98.890 OTHER SPECIFIED POSTPROCEDURAL STATES: Chronic | ICD-10-CM

## 2024-11-11 DIAGNOSIS — D22.9 MELANOCYTIC NEVI, UNSPECIFIED: Chronic | ICD-10-CM

## 2024-11-11 PROCEDURE — 72131 CT LUMBAR SPINE W/O DYE: CPT | Mod: 26

## 2024-11-11 PROCEDURE — 72131 CT LUMBAR SPINE W/O DYE: CPT

## 2024-12-03 ENCOUNTER — APPOINTMENT (OUTPATIENT)
Dept: PAIN MANAGEMENT | Facility: CLINIC | Age: 19
End: 2024-12-03

## 2024-12-10 ENCOUNTER — APPOINTMENT (OUTPATIENT)
Dept: PEDIATRICS | Facility: CLINIC | Age: 19
End: 2024-12-10

## 2024-12-11 ENCOUNTER — APPOINTMENT (OUTPATIENT)
Dept: PREADMISSION TESTING | Facility: CLINIC | Age: 19
End: 2024-12-11

## 2024-12-11 VITALS
DIASTOLIC BLOOD PRESSURE: 76 MMHG | OXYGEN SATURATION: 99 % | SYSTOLIC BLOOD PRESSURE: 117 MMHG | TEMPERATURE: 98.2 F | HEART RATE: 84 BPM | HEIGHT: 58.07 IN | BODY MASS INDEX: 23.6 KG/M2 | WEIGHT: 112.44 LBS

## 2024-12-11 DIAGNOSIS — Z01.818 ENCOUNTER FOR OTHER PREPROCEDURAL EXAMINATION: ICD-10-CM

## 2024-12-11 DIAGNOSIS — M47.816 SPONDYLOSIS W/OUT MYELOPATHY OR RADICULOPATHY, LUMBAR REGION: ICD-10-CM

## 2024-12-11 LAB
ABO + RH PNL BLD: NORMAL
ALBUMIN SERPL ELPH-MCNC: 4.6 G/DL
ALP BLD-CCNC: 96 U/L
ALT SERPL-CCNC: 52 U/L
ANION GAP SERPL CALC-SCNC: 15 MMOL/L
AST SERPL-CCNC: 25 U/L
BILIRUB SERPL-MCNC: 0.3 MG/DL
BLD GP AB SCN SERPL QL: NORMAL
BUN SERPL-MCNC: 18 MG/DL
CALCIUM SERPL-MCNC: 9.6 MG/DL
CHLORIDE SERPL-SCNC: 99 MMOL/L
CO2 SERPL-SCNC: 21 MMOL/L
CREAT SERPL-MCNC: 0.68 MG/DL
EGFR: 128 ML/MIN/1.73M2
GLUCOSE SERPL-MCNC: 81 MG/DL
HCT VFR BLD CALC: 39.2 %
HGB BLD-MCNC: 12.8 G/DL
MCHC RBC-ENTMCNC: 30.4 PG
MCHC RBC-ENTMCNC: 32.7 G/DL
MCV RBC AUTO: 93.1 FL
PLATELET # BLD AUTO: 340 K/UL
POTASSIUM SERPL-SCNC: 4 MMOL/L
PROT SERPL-MCNC: 7.6 G/DL
RBC # BLD: 4.21 M/UL
RBC # FLD: 12 %
SODIUM SERPL-SCNC: 135 MMOL/L
WBC # FLD AUTO: 5.37 K/UL

## 2024-12-11 PROCEDURE — ZZZZZ: CPT

## 2024-12-12 ENCOUNTER — APPOINTMENT (OUTPATIENT)
Dept: PAIN MANAGEMENT | Facility: CLINIC | Age: 19
End: 2024-12-12
Payer: COMMERCIAL

## 2024-12-12 VITALS — HEIGHT: 58.7 IN | WEIGHT: 112 LBS | BODY MASS INDEX: 22.88 KG/M2

## 2024-12-12 DIAGNOSIS — Z98.1 ARTHRODESIS STATUS: ICD-10-CM

## 2024-12-12 DIAGNOSIS — M79.18 MYALGIA, OTHER SITE: ICD-10-CM

## 2024-12-12 PROCEDURE — 99213 OFFICE O/P EST LOW 20 MIN: CPT

## 2024-12-16 ENCOUNTER — INPATIENT (INPATIENT)
Age: 19
LOS: 3 days | Discharge: HOME | End: 2024-12-20
Attending: NEUROLOGICAL SURGERY | Admitting: NEUROLOGICAL SURGERY
Payer: COMMERCIAL

## 2024-12-16 ENCOUNTER — TRANSCRIPTION ENCOUNTER (OUTPATIENT)
Age: 19
End: 2024-12-16

## 2024-12-16 VITALS
RESPIRATION RATE: 16 BRPM | HEART RATE: 89 BPM | TEMPERATURE: 98 F | OXYGEN SATURATION: 100 % | WEIGHT: 111.55 LBS | DIASTOLIC BLOOD PRESSURE: 86 MMHG | HEIGHT: 58.07 IN | SYSTOLIC BLOOD PRESSURE: 120 MMHG

## 2024-12-16 DIAGNOSIS — Q45.8 OTHER SPECIFIED CONGENITAL MALFORMATIONS OF DIGESTIVE SYSTEM: Chronic | ICD-10-CM

## 2024-12-16 DIAGNOSIS — T14.90 INJURY, UNSPECIFIED: Chronic | ICD-10-CM

## 2024-12-16 DIAGNOSIS — Q06.8 OTHER SPECIFIED CONGENITAL MALFORMATIONS OF SPINAL CORD: Chronic | ICD-10-CM

## 2024-12-16 DIAGNOSIS — M47.896 OTHER SPONDYLOSIS, LUMBAR REGION: ICD-10-CM

## 2024-12-16 DIAGNOSIS — Q42.3 CONGENITAL ABSENCE, ATRESIA AND STENOSIS OF ANUS WITHOUT FISTULA: Chronic | ICD-10-CM

## 2024-12-16 DIAGNOSIS — Q06.9 CONGENITAL MALFORMATION OF SPINAL CORD, UNSPECIFIED: Chronic | ICD-10-CM

## 2024-12-16 DIAGNOSIS — M20.60 ACQUIRED DEFORMITIES OF TOE(S), UNSPECIFIED, UNSPECIFIED FOOT: Chronic | ICD-10-CM

## 2024-12-16 DIAGNOSIS — Z98.89 OTHER SPECIFIED POSTPROCEDURAL STATES: Chronic | ICD-10-CM

## 2024-12-16 DIAGNOSIS — N35.9 URETHRAL STRICTURE, UNSPECIFIED: Chronic | ICD-10-CM

## 2024-12-16 DIAGNOSIS — M95.5 ACQUIRED DEFORMITY OF PELVIS: Chronic | ICD-10-CM

## 2024-12-16 DIAGNOSIS — D22.9 MELANOCYTIC NEVI, UNSPECIFIED: Chronic | ICD-10-CM

## 2024-12-16 DIAGNOSIS — Z98.890 OTHER SPECIFIED POSTPROCEDURAL STATES: Chronic | ICD-10-CM

## 2024-12-16 DIAGNOSIS — Q64.12 CLOACAL EXSTROPHY OF URINARY BLADDER: Chronic | ICD-10-CM

## 2024-12-16 DIAGNOSIS — N21.0 CALCULUS IN BLADDER: Chronic | ICD-10-CM

## 2024-12-16 DIAGNOSIS — N32.2 VESICAL FISTULA, NOT ELSEWHERE CLASSIFIED: Chronic | ICD-10-CM

## 2024-12-16 LAB
GAS PNL BLDA: SIGNIFICANT CHANGE UP
GAS PNL BLDA: SIGNIFICANT CHANGE UP
HCG UR QL: NEGATIVE — SIGNIFICANT CHANGE UP
HCT VFR BLD CALC: 30.7 % — LOW (ref 34.5–45)
HGB BLD-MCNC: 10 G/DL — LOW (ref 11.5–15.5)
MCHC RBC-ENTMCNC: 30.6 PG — SIGNIFICANT CHANGE UP (ref 27–34)
MCHC RBC-ENTMCNC: 32.6 G/DL — SIGNIFICANT CHANGE UP (ref 32–36)
MCV RBC AUTO: 93.9 FL — SIGNIFICANT CHANGE UP (ref 80–100)
NRBC # BLD: 0 /100 WBCS — SIGNIFICANT CHANGE UP (ref 0–0)
NRBC # FLD: 0 K/UL — SIGNIFICANT CHANGE UP (ref 0–0)
PLATELET # BLD AUTO: 290 K/UL — SIGNIFICANT CHANGE UP (ref 150–400)
RBC # BLD: 3.27 M/UL — LOW (ref 3.8–5.2)
RBC # FLD: 12.1 % — SIGNIFICANT CHANGE UP (ref 10.3–14.5)
WBC # BLD: 9.55 K/UL — SIGNIFICANT CHANGE UP (ref 3.8–10.5)
WBC # FLD AUTO: 9.55 K/UL — SIGNIFICANT CHANGE UP (ref 3.8–10.5)

## 2024-12-16 PROCEDURE — 99291 CRITICAL CARE FIRST HOUR: CPT

## 2024-12-16 DEVICE — SCREW SET: Type: IMPLANTABLE DEVICE | Status: FUNCTIONAL

## 2024-12-16 DEVICE — BONE FILLER BIOCOMPOSITE STIMULAN RAPID CURE 10CC: Type: IMPLANTABLE DEVICE | Status: FUNCTIONAL

## 2024-12-16 DEVICE — BONE WAX 2.5GM: Type: IMPLANTABLE DEVICE | Status: FUNCTIONAL

## 2024-12-16 DEVICE — ROD NON STRL 5.5X500MM: Type: IMPLANTABLE DEVICE | Status: FUNCTIONAL

## 2024-12-16 DEVICE — IMPLANTABLE DEVICE: Type: IMPLANTABLE DEVICE | Status: FUNCTIONAL

## 2024-12-16 DEVICE — SCREW MAS 6.5X50MM: Type: IMPLANTABLE DEVICE | Status: FUNCTIONAL

## 2024-12-16 DEVICE — GRAFT BONE INFUSE KIT LG II: Type: IMPLANTABLE DEVICE | Status: FUNCTIONAL

## 2024-12-16 DEVICE — SURGIFLO MATRIX WITH THROMBIN KIT: Type: IMPLANTABLE DEVICE | Status: FUNCTIONAL

## 2024-12-16 DEVICE — SET SCREW CONN 5.5: Type: IMPLANTABLE DEVICE | Status: FUNCTIONAL

## 2024-12-16 DEVICE — SCREW SOLERA 7.5X50MM: Type: IMPLANTABLE DEVICE | Status: FUNCTIONAL

## 2024-12-16 DEVICE — SURGIFOAM PAD 8CM X 12.5CM X 2MM (100C): Type: IMPLANTABLE DEVICE | Status: FUNCTIONAL

## 2024-12-16 DEVICE — SCREW MAS 6.5X55MM: Type: IMPLANTABLE DEVICE | Status: FUNCTIONAL

## 2024-12-16 RX ORDER — CELECOXIB 200 MG/1
200 CAPSULE ORAL
Refills: 0 | Status: DISCONTINUED | OUTPATIENT
Start: 2024-12-16 | End: 2024-12-16

## 2024-12-16 RX ORDER — METHADONE HYDROCHLORIDE 5 MG/1
5 TABLET ORAL ONCE
Refills: 0 | Status: DISCONTINUED | OUTPATIENT
Start: 2024-12-16 | End: 2024-12-16

## 2024-12-16 RX ORDER — ACETAMINOPHEN 500MG 500 MG/1
750 TABLET, COATED ORAL EVERY 6 HOURS
Refills: 0 | Status: COMPLETED | OUTPATIENT
Start: 2024-12-16 | End: 2024-12-17

## 2024-12-16 RX ORDER — DIAZEPAM 10 MG/1
5 TABLET ORAL EVERY 24 HOURS
Refills: 0 | Status: DISCONTINUED | OUTPATIENT
Start: 2024-12-16 | End: 2024-12-16

## 2024-12-16 RX ORDER — ELECTROLYTE-M SOLUTION/D5W 5 %
1000 INTRAVENOUS SOLUTION INTRAVENOUS
Refills: 0 | Status: DISCONTINUED | OUTPATIENT
Start: 2024-12-16 | End: 2024-12-17

## 2024-12-16 RX ORDER — FENTANYL 12 UG/H
25 PATCH, EXTENDED RELEASE TRANSDERMAL
Refills: 0 | Status: DISCONTINUED | OUTPATIENT
Start: 2024-12-16 | End: 2024-12-16

## 2024-12-16 RX ORDER — ONDANSETRON HYDROCHLORIDE 4 MG/1
4 TABLET, FILM COATED ORAL ONCE
Refills: 0 | Status: DISCONTINUED | OUTPATIENT
Start: 2024-12-16 | End: 2024-12-16

## 2024-12-16 RX ORDER — CETIRIZINE HYDROCHLORIDE 10 MG/1
10 TABLET ORAL DAILY
Refills: 0 | Status: DISCONTINUED | OUTPATIENT
Start: 2024-12-16 | End: 2024-12-20

## 2024-12-16 RX ORDER — KETOROLAC TROMETHAMINE 30 MG/ML
25 INJECTION INTRAMUSCULAR; INTRAVENOUS EVERY 6 HOURS
Refills: 0 | Status: DISCONTINUED | OUTPATIENT
Start: 2024-12-16 | End: 2024-12-18

## 2024-12-16 RX ORDER — CYCLOBENZAPRINE HCL 10 MG
5 TABLET ORAL
Refills: 0 | Status: DISCONTINUED | OUTPATIENT
Start: 2024-12-16 | End: 2024-12-16

## 2024-12-16 RX ORDER — HEPARIN SODIUM,PORCINE/PF 100/ML (1)
0.06 VIAL (ML) INTRAVENOUS
Qty: 250 | Refills: 0 | Status: DISCONTINUED | OUTPATIENT
Start: 2024-12-16 | End: 2024-12-17

## 2024-12-16 RX ORDER — POLYETHYLENE GLYCOL 3350 17 G/17G
17 POWDER, FOR SOLUTION ORAL DAILY
Refills: 0 | Status: DISCONTINUED | OUTPATIENT
Start: 2024-12-16 | End: 2024-12-20

## 2024-12-16 RX ORDER — CHLORHEXIDINE GLUCONATE 1.2 MG/ML
1 RINSE ORAL ONCE
Refills: 0 | Status: COMPLETED | OUTPATIENT
Start: 2024-12-16 | End: 2024-12-16

## 2024-12-16 RX ORDER — MESALAMINE 375 MG/1
1000 CAPSULE, EXTENDED RELEASE ORAL DAILY
Refills: 0 | Status: DISCONTINUED | OUTPATIENT
Start: 2024-12-16 | End: 2024-12-17

## 2024-12-16 RX ORDER — ACETAMINOPHEN 500MG 500 MG/1
1000 TABLET, COATED ORAL EVERY 6 HOURS
Refills: 0 | Status: DISCONTINUED | OUTPATIENT
Start: 2024-12-16 | End: 2024-12-16

## 2024-12-16 RX ORDER — OXYCODONE HYDROCHLORIDE 30 MG/1
5 TABLET ORAL EVERY 6 HOURS
Refills: 0 | Status: DISCONTINUED | OUTPATIENT
Start: 2024-12-16 | End: 2024-12-16

## 2024-12-16 RX ORDER — DIAZEPAM 10 MG/1
5 TABLET ORAL EVERY 6 HOURS
Refills: 0 | Status: DISCONTINUED | OUTPATIENT
Start: 2024-12-16 | End: 2024-12-16

## 2024-12-16 RX ORDER — CEFAZOLIN SODIUM 10 G
1520 VIAL (EA) INJECTION ONCE
Refills: 0 | Status: COMPLETED | OUTPATIENT
Start: 2024-12-16 | End: 2024-12-16

## 2024-12-16 RX ORDER — DIAZEPAM 10 MG/1
5 TABLET ORAL EVERY 6 HOURS
Refills: 0 | Status: DISCONTINUED | OUTPATIENT
Start: 2024-12-16 | End: 2024-12-20

## 2024-12-16 RX ORDER — METHADONE HYDROCHLORIDE 5 MG/1
5 TABLET ORAL EVERY 6 HOURS
Refills: 0 | Status: DISCONTINUED | OUTPATIENT
Start: 2024-12-16 | End: 2024-12-17

## 2024-12-16 RX ORDER — OXYCODONE HYDROCHLORIDE 30 MG/1
5 TABLET ORAL EVERY 4 HOURS
Refills: 0 | Status: DISCONTINUED | OUTPATIENT
Start: 2024-12-16 | End: 2024-12-20

## 2024-12-16 RX ORDER — KETOROLAC TROMETHAMINE 30 MG/ML
30 INJECTION INTRAMUSCULAR; INTRAVENOUS EVERY 6 HOURS
Refills: 0 | Status: DISCONTINUED | OUTPATIENT
Start: 2024-12-16 | End: 2024-12-16

## 2024-12-16 RX ADMIN — DIAZEPAM 5 MILLIGRAM(S): 10 TABLET ORAL at 19:32

## 2024-12-16 RX ADMIN — Medication 152 MILLIGRAM(S): at 23:00

## 2024-12-16 RX ADMIN — Medication 3 UNIT(S)/KG/HR: at 20:31

## 2024-12-16 RX ADMIN — FENTANYL 25 MICROGRAM(S): 12 PATCH, EXTENDED RELEASE TRANSDERMAL at 16:24

## 2024-12-16 RX ADMIN — MESALAMINE 1000 MILLIGRAM(S): 375 CAPSULE, EXTENDED RELEASE ORAL at 20:33

## 2024-12-16 RX ADMIN — CHLORHEXIDINE GLUCONATE 1 APPLICATION(S): 1.2 RINSE ORAL at 07:57

## 2024-12-16 RX ADMIN — CETIRIZINE HYDROCHLORIDE 10 MILLIGRAM(S): 10 TABLET ORAL at 20:33

## 2024-12-16 RX ADMIN — Medication 90 MILLILITER(S): at 23:00

## 2024-12-16 RX ADMIN — Medication 16 MILLIGRAM(S): at 20:33

## 2024-12-16 RX ADMIN — METHADONE HYDROCHLORIDE 3 MILLIGRAM(S): 5 TABLET ORAL at 23:10

## 2024-12-16 RX ADMIN — OXYCODONE HYDROCHLORIDE 5 MILLIGRAM(S): 30 TABLET ORAL at 16:45

## 2024-12-16 RX ADMIN — METHADONE HYDROCHLORIDE 3 MILLIGRAM(S): 5 TABLET ORAL at 17:37

## 2024-12-16 RX ADMIN — ACETAMINOPHEN 500MG 300 MILLIGRAM(S): 500 TABLET, COATED ORAL at 21:36

## 2024-12-16 NOTE — TRANSFER ACCEPTANCE NOTE - ATTENDING COMMENTS
20 y/o F with complex PMHx and PSurgHx including cloacal extrophy s/p repair, bifid uterus s/p reconstruction, imperforated anus s/p reconstruction, tethered cord s/p repair, s/p Mitrofanoff, colostomy, hx of GI bleed, L1 vertebral column resection, T11-L3 stabilization and fusion on 11/10/2022, s/p left midfoot osteoplasty, tibialis anterior tendon transfer 3/2023, now s/p L3-iliac stabilization and fusion. No acute post operative complications.    Gen - NAD, resting in bed  CV - S1 S2 No MRG  Abd - Soft NT ND  Skin - Warm and well perfused  Neuro - No gross deficits  Extremities - No peripheral edema    PLAN:  - Monitor on RA  - Hemodynamic monitoring  - Check post op CBC  - Advance diet as tolerated  - Resume home meds  - Pain control    Critical care time 35 min 18 y/o F with complex PMHx and PSurgHx including cloacal extrophy s/p repair, bifid uterus s/p reconstruction, imperforated anus s/p reconstruction, tethered cord s/p repair, s/p Mitrofanoff, colostomy, hx of GI bleed, L1 vertebral column resection, T11-L3 stabilization and fusion on 11/10/2022, s/p left midfoot osteoplasty, tibialis anterior tendon transfer 3/2023, now s/p L3-iliac stabilization and fusion. No acute post operative complications.    Gen - NAD, resting in bed  CV - S1 S2 No MRG  Abd - Soft NT ND  Skin - Warm and well perfused  Neuro - No gross deficits  Extremities - No peripheral edema    PLAN:  - Monitor on RA  - Hemodynamic monitoring  - Check post op CBC  - Advance diet as tolerated  - Resume home meds  - Pain control  - Neuro monitoring    Critical care time 35 min

## 2024-12-16 NOTE — TRANSFER ACCEPTANCE NOTE - ASSESSMENT
18 yo female with PMH significant for cloacal exstrophy, bladder fistula, s/p tethered cord and imperforate anus s/p bladder reconstruction, neovagina, and tethered cord release with current Mitrofanoff and colostomy, with chronic right lower back pain now POD 0 following L3 to iliac stabilization and fusion on 12/16/24 with Dr. Vargas. OR course uncomplicated. EBL approx. 300cc. PACU course uncomplicated. S/P Fentanyl, Oxycodone, Methadone, with plan for initiation of Dilaudid PCA. Admit to PICU for close neurological and hemodynamic monitoring.     PLAN:    NEURO:  - Q1hr neuro checks  - Tylenol ATC  - Oxycodone PRN  - Anesthesia to initiate Dilaudid PCA --> settings TBD  - Continue home meds of Celebrex, Robaxin, Flexeril     Resp:  - RA    CV:   - Vitals per routine    ID:  - Post op ancef x24 hrs  - Monitor for fever    Heme:  - Hgb stable (baseline Hgb ~10)  - F/u repeat CBC @8pm    LYNDAI:  - Advance JERMAINE  - mIVF --> D/C once tolerating PO  - Strict Is&Os --> Mitrofanoff  - Monitor colostomy output  - Continue home meds of Loperamide, Xifaxan, Pentasa    Access:  - PIV x3  - L radial arterial line    Incision/Dressing:   - Aquacell to spine C/D/I    Drains:  - Hemovac x1  18 yo female with PMH significant for cloacal exstrophy, bladder fistula, s/p tethered cord and imperforate anus s/p bladder reconstruction, neovagina, and tethered cord release with current Mitrofanoff and colostomy, with chronic right lower back pain now POD 0 following L3 to iliac stabilization and fusion on 12/16/24 with Dr. Vargas. OR course uncomplicated. EBL approx. 300cc. PACU course uncomplicated. Admit to PICU for close neurological and hemodynamic monitoring.     PLAN:    NEURO:  - Q1hr neuro checks  - Tylenol, Toradol, Valium ATC  - Oxycodone PRN  - Methadone (dosing TBD by anesthesia - Dr. Collins)  - Continue home med of Robaxin (hold Flexeril, Celebrex for now - per pain (Dr. Collins)    Resp:  - RA    CV:   - Vitals per routine    ID:  - Post op ancef x24 hrs  - Monitor for fever    Heme:  - Hgb stable (baseline Hgb ~10)  - F/u repeat CBC @8pm    FENGI:  - Advance JERMAINE  - mIVF --> D/C once tolerating PO  - Strict Is&Os --> Mitrofanoff  - Monitor colostomy output  - Continue home meds of Loperamide, Xifaxan, Pentasa    Access:  - PIV x3  - L radial arterial line    Incision/Dressing:   - Aquacell to spine C/D/I    Drains:  - Hemovac x1

## 2024-12-16 NOTE — TRANSFER ACCEPTANCE NOTE - FEMALE-SPECIFIC SYMPTOMS
bleeding every 10-12 days mild-moderate flow, was on Depo-Provera in [place] but unable to refill in [place]. Plan to establish GYN care in [place].

## 2024-12-16 NOTE — TRANSFER ACCEPTANCE NOTE - GASTROINTESTINAL COMMENTS
+colostomy. Variable stool consistency, frequently watery stools- ostomy bag- denies blood in stool. On Immodium. Follows with Hoang Laguerre and Fredi Colostomy bag, pink moist stoma, small amount of pasty/loose yellowish stool in bag

## 2024-12-16 NOTE — TRANSFER ACCEPTANCE NOTE - NSICDXPASTMEDICALHX_GEN_ALL_CORE_FT
PAST MEDICAL HISTORY:  Back pain     Cavus deformity of foot     Cloacal Exstrophy     Colostomy in place     Congenital Imperforate Anus     Gastroesophageal reflux disease, esophagitis presence not specified     Gastrointestinal bleeding     H/O dislocation of hip Followed by Orthopedist in [facility]     Iron deficiency anemia, unspecified iron deficiency anemia type     Neurogenic bladder     Other specified congenital malformations of spinal cord     Spondylosis of lumbar spine     Tethered Cord spinal leakage with surgery 5/2016    Urinary leakage     Viral meningitis May 2016

## 2024-12-16 NOTE — TRANSFER ACCEPTANCE NOTE - NSICDXPASTSURGICALHX_GEN_ALL_CORE_FT
PAST SURGICAL HISTORY:  Bladder fistula Resection and repair by Victorina    Bladder stone Removal of bladder stone by Gitlin    Cloacal exstrophy evaluation of fallopian tubes(chromotubation) pelvic reconstruction by Rudy    Cloacal exstrophy Right jugular central venous catheter, cystourethroscopy, stone extraction, laparotomy, lysis of adhesions, takedown of colostomy, creation of Walker,, creation of neovagina with small bowel, anastomosis of neovagina to bilateral hemicervical cuff by Meredith Boothe exstnicky EUA, cystoscopy, vaginoscopy, cystogram and removal of suture by Gitlin Cloacal exstrophy Ileostomy takedown, pulled through segment of colon out of pelvis and created end colostomy by Kat  30 cm segment of small bowel for bladder augment by Chrissy and Gitlin    Cloacal exstrophy cystoscopy of Mitrofanoff channel and EUA by Gitlin    Cloacal exstrophy Revision of Mitrofanoff, abdominal exploration and lysis of adhesions, retroperitoneal exploration, closure of vesico-cutaneous fistula by Chrissy    Cloacal exstrophy Revision of Mitrofanoff Sept 2015    Cloacal extrophy of urinary bladder clitoroplasty, umbilicoplasty, labioplasty, retrograde ureterogram by Chrissy.  closure off cloacal/bladder extrophy, placement of open ureteral stent by Gitlin    H/O endoscopy     H/O foot surgery left February 2017    Imperforate anus reconstruction of perineal body, closure of posterior sagittal wound by Kat    Imperforate anus Cystovaginoscopy, redo PSARP    Imperforate anus PSARP by Kat    Nevus excision of nevus of right thigh/buttock crease, revision of colostomy, cystoscopy and cystogram by Kat    Pelvic deformity Adjustment of external fixator, with removal of iliac wing pins (2), bilateral irrigation and debridement of open wounds around pins in anterior portion of pelvis, bilateral by Kylee.  Leg length discrepancy surgery 2018  EUA by Gitlin    Pelvic deformity Removal of pelvic external fixator    S/P Colostomy S/P revision of colostomy on 5/2021- pt had loop in her bowel    S/P Ileostomy     S/P lumbar laminectomy 4/28/15 Dr. Vargas    S/P urinary bladder replacement Mitrofanoff 4/2011    Stenosis of urinary meatus endoscopy of Mitrofanoff by Krill    Tethered cord repaired x3 thus far. 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 Vargas    Toe deformity Toe flexor lengthening, first through 5th left.  Toe flexor lengthening, third threough 5th by Kylee    Wound S/P pull-through for complex cloacal extrophy, EUA and placement of wound VAC by Kat    Wound EUA and VAC dressing change  3/17/08, 3/20/08, 3/23/08    Wound EUA, VAC placement for abdominal wounds by Kat

## 2024-12-16 NOTE — TRANSFER ACCEPTANCE NOTE - HISTORY OF PRESENT ILLNESS
20 y/o F with PMHx of cloacal extrophy s/p repair, bifid uterus s/p reconstruction, imperforated anus s/p reconstruction, tethered cord s/p repair, s/p Mitrofanoff, colostomy, hx of PRBC transfusions and iron deficiency anemia due to GI bleeds. Also s/p L1 vertebral column resection, T11-L3 stabilization and fusion on 11/10/2022 with Dr. May and Sam [place] [facility] [place]. 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 She underwent left midfoot osteoplasty, tibialis anterior tendon transfer in March 2023 with Dr. Pichardo.   In 2023 patient was incidentally  found to have bladder stones on Xray. She is s/p Percutaneous Cystolitholapaxy, bladder stone fragmentation and removal 2/16/23 at [facility].

## 2024-12-16 NOTE — TRANSFER ACCEPTANCE NOTE - GENITOURINARY COMMENTS
Suprapubic cath in place- clear urine in bag- no gross hematuria noted + Mitrofanoff. Intermittent self cath with 12 Welsh cath. Urine bag present to prevent leakage.

## 2024-12-17 PROCEDURE — 99232 SBSQ HOSP IP/OBS MODERATE 35: CPT

## 2024-12-17 RX ORDER — 0.9 % SODIUM CHLORIDE 0.9 %
250 INTRAVENOUS SOLUTION INTRAVENOUS
Refills: 0 | Status: DISCONTINUED | OUTPATIENT
Start: 2024-12-17 | End: 2024-12-18

## 2024-12-17 RX ORDER — ACETAMINOPHEN 500MG 500 MG/1
650 TABLET, COATED ORAL EVERY 6 HOURS
Refills: 0 | Status: DISCONTINUED | OUTPATIENT
Start: 2024-12-17 | End: 2024-12-20

## 2024-12-17 RX ADMIN — ACETAMINOPHEN 500MG 300 MILLIGRAM(S): 500 TABLET, COATED ORAL at 03:57

## 2024-12-17 RX ADMIN — ACETAMINOPHEN 500MG 650 MILLIGRAM(S): 500 TABLET, COATED ORAL at 23:49

## 2024-12-17 RX ADMIN — CETIRIZINE HYDROCHLORIDE 10 MILLIGRAM(S): 10 TABLET ORAL at 12:13

## 2024-12-17 RX ADMIN — KETOROLAC TROMETHAMINE 25 MILLIGRAM(S): 30 INJECTION INTRAMUSCULAR; INTRAVENOUS at 14:30

## 2024-12-17 RX ADMIN — Medication 3 MILLILITER(S): at 07:12

## 2024-12-17 RX ADMIN — KETOROLAC TROMETHAMINE 25 MILLIGRAM(S): 30 INJECTION INTRAMUSCULAR; INTRAVENOUS at 08:26

## 2024-12-17 RX ADMIN — ACETAMINOPHEN 500MG 300 MILLIGRAM(S): 500 TABLET, COATED ORAL at 11:13

## 2024-12-17 RX ADMIN — MESALAMINE 1000 MILLIGRAM(S): 375 CAPSULE, EXTENDED RELEASE ORAL at 12:13

## 2024-12-17 RX ADMIN — KETOROLAC TROMETHAMINE 25 MILLIGRAM(S): 30 INJECTION INTRAMUSCULAR; INTRAVENOUS at 14:00

## 2024-12-17 RX ADMIN — OXYCODONE HYDROCHLORIDE 5 MILLIGRAM(S): 30 TABLET ORAL at 21:58

## 2024-12-17 RX ADMIN — KETOROLAC TROMETHAMINE 25 MILLIGRAM(S): 30 INJECTION INTRAMUSCULAR; INTRAVENOUS at 08:30

## 2024-12-17 RX ADMIN — OXYCODONE HYDROCHLORIDE 5 MILLIGRAM(S): 30 TABLET ORAL at 15:53

## 2024-12-17 RX ADMIN — DIAZEPAM 5 MILLIGRAM(S): 10 TABLET ORAL at 19:02

## 2024-12-17 RX ADMIN — OXYCODONE HYDROCHLORIDE 5 MILLIGRAM(S): 30 TABLET ORAL at 16:30

## 2024-12-17 RX ADMIN — KETOROLAC TROMETHAMINE 25 MILLIGRAM(S): 30 INJECTION INTRAMUSCULAR; INTRAVENOUS at 20:04

## 2024-12-17 RX ADMIN — METHADONE HYDROCHLORIDE 3 MILLIGRAM(S): 5 TABLET ORAL at 05:19

## 2024-12-17 RX ADMIN — ACETAMINOPHEN 500MG 300 MILLIGRAM(S): 500 TABLET, COATED ORAL at 17:00

## 2024-12-17 RX ADMIN — KETOROLAC TROMETHAMINE 25 MILLIGRAM(S): 30 INJECTION INTRAMUSCULAR; INTRAVENOUS at 02:02

## 2024-12-17 RX ADMIN — ACETAMINOPHEN 500MG 750 MILLIGRAM(S): 500 TABLET, COATED ORAL at 11:30

## 2024-12-17 RX ADMIN — DIAZEPAM 5 MILLIGRAM(S): 10 TABLET ORAL at 00:54

## 2024-12-17 RX ADMIN — OXYCODONE HYDROCHLORIDE 5 MILLIGRAM(S): 30 TABLET ORAL at 22:30

## 2024-12-17 RX ADMIN — Medication 3 MILLILITER(S): at 04:03

## 2024-12-17 RX ADMIN — DIAZEPAM 5 MILLIGRAM(S): 10 TABLET ORAL at 06:38

## 2024-12-17 RX ADMIN — KETOROLAC TROMETHAMINE 25 MILLIGRAM(S): 30 INJECTION INTRAMUSCULAR; INTRAVENOUS at 20:47

## 2024-12-17 RX ADMIN — DIAZEPAM 5 MILLIGRAM(S): 10 TABLET ORAL at 13:21

## 2024-12-17 RX ADMIN — KETOROLAC TROMETHAMINE 25 MILLIGRAM(S): 30 INJECTION INTRAMUSCULAR; INTRAVENOUS at 03:00

## 2024-12-17 NOTE — CHART NOTE - NSCHARTNOTEFT_GEN_A_CORE
Left radial arterial line removed as no longer needed. Line removed uneventfully, pressure held until hemostasis achieved. Pressure dressing placed. Discussed with PICU fellow.

## 2024-12-17 NOTE — PHYSICAL THERAPY INITIAL EVALUATION ADULT - GENERAL OBSERVATIONS, REHAB EVAL
Pt encountered supine in bed with mother present.  RN aware and agreeable to evaluation.  Lines- PIV, Sharifa, HMV, pulse ox, telemetry.

## 2024-12-17 NOTE — PHYSICAL THERAPY INITIAL EVALUATION ADULT - PERTINENT HX OF CURRENT PROBLEM, REHAB EVAL
20 y/o F with PMHx of cloacal extrophy s/p repair, bifid uterus s/p reconstruction, imperforated anus s/p reconstruction, tethered cord s/p repair, s/p Mitrofanoff colostomy, hx of PRBC transfusions and iron deficiency anemia due to GI bleeds. Patient s/p L1 vertebral column resection, T11-L3 stabilization and fusion on 11/10/2022 with Dr. May and Sam [place] [facility] [place] 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 She underwent left midfoot osteoplasty, tibialis anterior tendon transfer in March 2023 with Dr. Pichardo. s/p Percutaneous Cystolitholapaxy, bladder stone fragmentation and removal 2/16/23 at [facility].     12/16 S/p OR for L3-iliac instrumentation and fusion, closed with staples. Exam intact

## 2024-12-17 NOTE — OCCUPATIONAL THERAPY INITIAL EVALUATION PEDIATRIC - PERTINENT HX OF CURRENT PROBLEM, REHAB EVAL
18 y/o F with PMHx of cloacal extrophy s/p repair, bifid uterus s/p reconstruction, imperforated anus s/p reconstruction, tethered cord s/p repair, s/p Mitrofanoff colostomy, hx of PRBC transfusions and iron deficiency anemia due to GI bleeds. Patient s/p L1 vertebral column resection, T11-L3 stabilization and fusion on 11/10/2022 with Dr. May and Sam at [facility] [place]. 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 She underwent left midfoot osteoplasty, tibialis anterior tendon transfer in March 2023 with Dr. Pihcardo. s/p Percutaneous Cystolitholapaxy, bladder stone fragmentation and removal 2/16/23 at [facility].     12/16 S/p OR for L3-iliac instrumentation and fusion, closed with staples. Exam intact

## 2024-12-17 NOTE — OCCUPATIONAL THERAPY INITIAL EVALUATION PEDIATRIC - GROWTH AND DEVELOPMENT COMMENT, PEDS PROFILE
Pt lives with family has shower equipped with bench.  Prior to hospitalization she was independent with ADLs and ambulation.  No AE.

## 2024-12-18 RX ADMIN — DIAZEPAM 5 MILLIGRAM(S): 10 TABLET ORAL at 21:39

## 2024-12-18 RX ADMIN — DIAZEPAM 5 MILLIGRAM(S): 10 TABLET ORAL at 13:22

## 2024-12-18 RX ADMIN — KETOROLAC TROMETHAMINE 25 MILLIGRAM(S): 30 INJECTION INTRAMUSCULAR; INTRAVENOUS at 03:09

## 2024-12-18 RX ADMIN — ACETAMINOPHEN 500MG 650 MILLIGRAM(S): 500 TABLET, COATED ORAL at 11:31

## 2024-12-18 RX ADMIN — DIAZEPAM 5 MILLIGRAM(S): 10 TABLET ORAL at 01:11

## 2024-12-18 RX ADMIN — ACETAMINOPHEN 500MG 650 MILLIGRAM(S): 500 TABLET, COATED ORAL at 06:49

## 2024-12-18 RX ADMIN — OXYCODONE HYDROCHLORIDE 5 MILLIGRAM(S): 30 TABLET ORAL at 16:28

## 2024-12-18 RX ADMIN — ACETAMINOPHEN 500MG 650 MILLIGRAM(S): 500 TABLET, COATED ORAL at 06:10

## 2024-12-18 RX ADMIN — ACETAMINOPHEN 500MG 650 MILLIGRAM(S): 500 TABLET, COATED ORAL at 00:30

## 2024-12-18 RX ADMIN — CETIRIZINE HYDROCHLORIDE 10 MILLIGRAM(S): 10 TABLET ORAL at 11:30

## 2024-12-18 RX ADMIN — KETOROLAC TROMETHAMINE 25 MILLIGRAM(S): 30 INJECTION INTRAMUSCULAR; INTRAVENOUS at 02:07

## 2024-12-18 RX ADMIN — ACETAMINOPHEN 500MG 650 MILLIGRAM(S): 500 TABLET, COATED ORAL at 18:28

## 2024-12-18 RX ADMIN — Medication 16 MILLIGRAM(S): at 21:39

## 2024-12-18 RX ADMIN — DIAZEPAM 5 MILLIGRAM(S): 10 TABLET ORAL at 06:50

## 2024-12-18 RX ADMIN — ACETAMINOPHEN 500MG 650 MILLIGRAM(S): 500 TABLET, COATED ORAL at 19:30

## 2024-12-18 NOTE — PATIENT PROFILE ADULT - FALL HARM RISK - UNIVERSAL INTERVENTIONS
Bed in lowest position, wheels locked, appropriate side rails in place/Call bell, personal items and telephone in reach/Instruct patient to call for assistance before getting out of bed or chair/Non-slip footwear when patient is out of bed/[place] to call system/Physically safe environment - no spills, clutter or unnecessary equipment/Purposeful Proactive Rounding/Room/bathroom lighting operational, light cord in reach

## 2024-12-19 RX ADMIN — DIAZEPAM 5 MILLIGRAM(S): 10 TABLET ORAL at 08:57

## 2024-12-19 RX ADMIN — DIAZEPAM 5 MILLIGRAM(S): 10 TABLET ORAL at 15:00

## 2024-12-19 RX ADMIN — ACETAMINOPHEN 500MG 650 MILLIGRAM(S): 500 TABLET, COATED ORAL at 06:16

## 2024-12-19 RX ADMIN — ACETAMINOPHEN 500MG 650 MILLIGRAM(S): 500 TABLET, COATED ORAL at 00:41

## 2024-12-19 RX ADMIN — DIAZEPAM 5 MILLIGRAM(S): 10 TABLET ORAL at 21:19

## 2024-12-19 RX ADMIN — ACETAMINOPHEN 500MG 650 MILLIGRAM(S): 500 TABLET, COATED ORAL at 18:24

## 2024-12-19 RX ADMIN — CETIRIZINE HYDROCHLORIDE 10 MILLIGRAM(S): 10 TABLET ORAL at 10:11

## 2024-12-19 RX ADMIN — ACETAMINOPHEN 500MG 650 MILLIGRAM(S): 500 TABLET, COATED ORAL at 01:43

## 2024-12-19 RX ADMIN — DIAZEPAM 5 MILLIGRAM(S): 10 TABLET ORAL at 03:18

## 2024-12-19 RX ADMIN — Medication 16 MILLIGRAM(S): at 21:20

## 2024-12-19 RX ADMIN — Medication 16 MILLIGRAM(S): at 10:08

## 2024-12-19 RX ADMIN — ACETAMINOPHEN 500MG 650 MILLIGRAM(S): 500 TABLET, COATED ORAL at 12:38

## 2024-12-19 RX ADMIN — OXYCODONE HYDROCHLORIDE 5 MILLIGRAM(S): 30 TABLET ORAL at 19:10

## 2024-12-19 RX ADMIN — ACETAMINOPHEN 500MG 650 MILLIGRAM(S): 500 TABLET, COATED ORAL at 17:54

## 2024-12-19 RX ADMIN — ACETAMINOPHEN 500MG 650 MILLIGRAM(S): 500 TABLET, COATED ORAL at 12:08

## 2024-12-19 NOTE — PROGRESS NOTE PEDS - PROBLEM SELECTOR PROBLEM 1
Spondylosis of lumbar spine

## 2024-12-19 NOTE — PROGRESS NOTE PEDS - PROBLEM SELECTOR PLAN 1
- Bowel regimen has home regiment  - Advance diet and activity   - Neurochecks q3h   - Pain control per pain team   - PT/OT today  [id number]    Case discussed with attending neurosurgeon Dr. Vargas/ Yadira
- Cont home bowel regimen  - Neurochecks q4h   - Pain control per pain team   - PT/OT no needs  - Continue to monitor drain output     [id number]    Case discussed with attending neurosurgeon Dr. Vargas/ Yadira.
- Bowel regimen has home regiment  - Advance diet and activity   - Neurochecks q4h   - Pain control per pain team   - PT/OT today  [id number]    Case discussed with attending neurosurgeon Dr. Vargas/ Yadira
- CBC 4 hrs post op   - Ancef 24 hrs   - Bowel regimen   - Advance diet and activity   - Neurochecks q1h   - Pain control per pain med:  1) Methadone 5mg (0.1mg/kg) IV every 6 hours x 3 doses  2) Oxycodone 5mg PO every 4 hours prn for severe breakthrough pain   3) Acetaminophen 750mg (15mg/kg) IV every 6 hours for 24 hours then transition to PO   4) Ketorolac 25mg (0.5mg/kg) IV every 6 hours for 48 hours then transition to ibuprofen.   5) Diazepam 5mg PO every 6 hours.  6) Methocarbamol 1000mg PO every 8 hours prn muscle spasms.    7) Encourage activity and ambulation. Provided exercises to be performed while in bed. Recommend ambulation on postoperative day 1 with assistance from nursing and physical therapy.   8) Recommend incentive spirometry.    [id number]    Case discussed with attending neurosurgeon Dr. Vargas/ Yadira

## 2024-12-19 NOTE — PROGRESS NOTE PEDS - TIME BILLING
Patient doing well.  Pain well controlled.  Ambulated around johnson today.  Continue hemovac.  DC planning, possibly home tomorrow.

## 2024-12-19 NOTE — PROGRESS NOTE PEDS - ASSESSMENT
18 yo female with cloacal exstrophy, bladder fistula, s/p tethered cord and imperforate anus s/p bladder reconstruction, neovagina, and tethered cord release with current Mitrofanoff and colostomy, with chronic right lower back pain now POD 0 following L3 to iliac stabilization and fusion on 12/16/24 with Dr. Vargas, admitted to PICU for close neurological and hemodynamic monitoring.     NEURO:  - Enteral and IV pain meds per pain team   - Continue home med of Robaxin (hold Flexeril, Celebrex for now - per pain (Dr. Collins)    ID:  - Post op ancef x24 hrs  - Monitor for fever    Heme:  - Hgb stable (baseline Hgb ~10)  - F/u repeat CBC @8pm    FENGI:  - Advance JERMAINE  - mIVF --> D/C once tolerating PO  - Strict Is&Os --> Mitrofanoff  - Monitor colostomy output  - Continue home GI meds --- hold home loperamide while on standing opioids     Access:  - PIV x3  - L radial arterial line -- ok to remove     Incision/Dressing:   - Aquacell to spine C/D/I    Drains:  - Hemovac x1   
20 y/o F with PMHx of cloacal extrophy s/p repair, bifid uterus s/p reconstruction, imperforated anus s/p reconstruction, tethered cord s/p repair, s/p Mitrofanoff colostomy, hx of PRBC transfusions and iron deficiency anemia due to GI bleeds. Patient s/p L1 vertebral column resection, T11-L3 stabilization and fusion on 11/10/2022 with Dr. May and Sam at [facility] [place]. 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 She underwent left midfoot osteoplasty, tibialis anterior tendon transfer in March 2023 with Dr. Pichardo. s/p Percutaneous Cystolitholapaxy, bladder stone fragmentation and removal 2/16/23 at [facility].     12/16 S/p OR for L3-iliac instrumentation and fusion, closed with staples. Exam intact  
18 y/o F with PMHx of cloacal extrophy s/p repair, bifid uterus s/p reconstruction, imperforated anus s/p reconstruction, tethered cord s/p repair, s/p Mitrofanoff colostomy, hx of PRBC transfusions and iron deficiency anemia due to GI bleeds. Patient s/p L1 vertebral column resection, T11-L3 stabilization and fusion [place] 11/10/2022 [place]. [place] [facility] [place] 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 She underwent left midfoot osteoplasty, tibialis anterior tendon transfer in March 2023 with Dr. Pichardo. s/p Percutaneous Cystolitholapaxy, bladder stone fragmentation and removal 2/16/23 at [facility].     12/16 S/p OR for L3-iliac instrumentation and fusion, closed with staples. Exam intact  
18 y/o F with PMHx of cloacal extrophy s/p repair, bifid uterus s/p reconstruction, imperforated anus s/p reconstruction, tethered cord s/p repair, s/p Mitrofanoff colostomy, hx of PRBC transfusions and iron deficiency anemia due to GI bleeds. Patient s/p L1 vertebral column resection, T11-L3 stabilization and fusion on 11/10/2022 [place] Dr. May [place] [facility] [place] 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 She underwent left midfoot osteoplasty, tibialis anterior tendon transfer in March 2023 with Dr. Pichardo. s/p Percutaneous Cystolitholapaxy, bladder stone fragmentation and removal 2/16/23 at [facility].     12/16 S/p OR for L3-iliac instrumentation and fusion, closed with staples. Exam intact  12/18 POD #2 HMV output 280cc/24H
20 y/o F with PMHx of cloacal extrophy s/p repair, bifid uterus s/p reconstruction, imperforated anus s/p reconstruction, tethered cord s/p repair, s/p Mitrofanoff colostomy, hx of PRBC transfusions and iron deficiency anemia due to GI bleeds. Patient s/p L1 vertebral column resection, T11-L3 stabilization and fusion on 11/10/2022 with Dr. May and Sam at [facility] [place]. 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 She underwent left midfoot osteoplasty, tibialis anterior tendon transfer in March 2023 with Dr. Pichardo. s/p Percutaneous Cystolitholapaxy, bladder stone fragmentation and removal 2/16/23 at [facility].     12/16 S/p OR for L3-iliac instrumentation and fusion, closed with staples. Exam intact  12/18 POD #2 HMV output 280cc/24H  12/19 POD 3 exam intact, HMV 90cc

## 2024-12-19 NOTE — PROGRESS NOTE PEDS - SUBJECTIVE AND OBJECTIVE BOX
Follow up consult for Acute Pain Management     SUBJECTIVE:  The patient was seen in PACU by anesthesia and given IV Methadone x 3 for pain yesterday and reports pain was controlled after. She states she is doing well with 5-6/10 pain that is tolerable. She was able to sleep last night and feeling comfortable currently. Patient and her mother amenable to asking for Oxycodone PRN.  		  OBJECTIVE:  Patient is laying in bed, mother present.    Pain Score: 5-6/10  (X) Refer to pain scores    Therapy:	[ ] IV PCA	[ ] Epidural   [ ] s/p Spinal Opioid	[ ] Peripheral nerve block  (x) PRN Oral/IV opioids and or Adjuvant non-opioid medications  	  Vital Signs Last 24 Hrs  T(C): 36.9 (17 Dec 2024 14:00), Max: 37.7 (16 Dec 2024 15:45)  T(F): 98.4 (17 Dec 2024 14:00), Max: 99.9 (16 Dec 2024 15:45)  HR: 83 (17 Dec 2024 15:00) (74 - 98)  BP: 114/65 (17 Dec 2024 14:00) (96/56 - 115/76)  BP(mean): 79 (17 Dec 2024 14:00) (64 - 103)  RR: 17 (17 Dec 2024 15:00) (12 - 24)  SpO2: 100% (17 Dec 2024 14:00) (99% - 100%)    Parameters below as of 17 Dec 2024 12:00  Patient On (Oxygen Delivery Method): room air        ( x) Alert & Oriented     ( ) No motor/sensory block     ( ) Nausea     ( ) Pruritis     ( ) Headache    ASSESSMENT/ PLAN    Therapy to  be:	[x ] Continue   [ ] Discontinued      Documentation and Verification of current medications:   [X] Done	[ ] Not done, not elligible    Comments: Patient's pain is better controlled. Continue current pain regimen. PRN Oral/IV opioids and/or Adjuvant non-opioid medication to be ordered at this point.  Pain service to sign off, no further recommendations for pain medications, at this time.  May call pain service if needed.    Progress Note written now but Patient was seen earlier.    
NEUROSURGERY POST OP CHECK   12-16-24 @ 21:08    Dx: 19y Female s/p L3-iliac instrumentation and fusion, closed with staples. Doing well. Pain controlled. Complaint of some b/l hip pain, likely from positioning.    MEDICATIONS  (STANDING):  acetaminophen   IV Intermittent - Peds. 750 milliGRAM(s) IV Intermittent every 6 hours  ceFAZolin  IV Intermittent - Peds 1520 milliGRAM(s) IV Intermittent once  cetirizine Oral Tab/Cap - Peds 10 milliGRAM(s) Oral daily  dextrose 5% + sodium chloride 0.9% with potassium chloride 20 mEq/L. - Pediatric 1000 milliLiter(s) (90 mL/Hr) IV Continuous <Continuous>  diazepam  Oral Tab/Cap - Peds 5 milliGRAM(s) Oral every 6 hours  heparin   Infusion - Pediatric 0.059 Unit(s)/kG/Hr (3 mL/Hr) IV Continuous <Continuous>  ketorolac IV Push - Peds. 25 milliGRAM(s) IV Push every 6 hours  loperamide Oral Tab/Cap - Peds 16 milliGRAM(s) Oral two times a day  mesalamine ER Oral Tab/Cap - Peds 1000 milliGRAM(s) Oral daily  methadone IV Intermittent - Peds UNDILUTED 5 milliGRAM(s) IV Intermittent every 6 hours  rifAXIMin Oral Tab/Cap - Peds 550 milliGRAM(s) Oral <User Schedule>    MEDICATIONS  (PRN):  Methocarbamol 500 mG 1000 milliGRAM(s) 1000 milliGRAM(s) Oral three times a day PRN as per neuro  ondansetron IV Intermittent - Peds 4 milliGRAM(s) IV Intermittent once PRN Nausea and/or Vomiting  oxyCODONE   IR Oral Tab/Cap - Peds 5 milliGRAM(s) Oral every 4 hours PRN Severe Pain (7 - 10)  polyethylene glycol 3350 Oral Powder - Peds 17 Gram(s) Oral daily PRN Constipation      I&O's Summary    16 Dec 2024 07:01  -  16 Dec 2024 21:08  --------------------------------------------------------  IN: 918 mL / OUT: 830 mL / NET: 88 mL        T(C): 37.4 (12-16-24 @ 17:35), Max: 37.4 (12-16-24 @ 17:35)  HR: 78 (12-16-24 @ 21:00) (75 - 89)  BP: 111/67 (12-16-24 @ 21:00) (111/67 - 115/76)  RR: 15 (12-16-24 @ 21:00) (14 - 22)  SpO2: 100% (12-16-24 @ 21:00) (100% - 100%)    PHYSICAL EXAM:  AOx3, Following Commands, Face symmetrical  EOMI, PERRL, CN 2-12 intact     RUE MOTOR:   Delt 5/5  Bicep 5/5  Tricep 5/5      HG 5/5      LUE MOTOR:   Delt 5/5  Bicep 5/5   Tricep 5/5     HG 5/5     RLE MOTOR:   HF 4/5 Pain limited             KF 5/5          KE 5/5         DF 5/5         PF 5/5    EHL 5/5    LLE MOTOR:   HF 5/5        KF 5/5          KE 5/5            DF 5/5            PF 5/5       EHL 5/5      SENSATION: intact to light touch     REFLEXES:  No clonus  No Hoffmans  Incision c/d/i         
OVERNIGHT EVENTS:  No issues overnight, HMV output 280cc/24H    Vital Signs Last 24 Hrs  T(C): 36.6 (17 Dec 2024 05:00), Max: 37.7 (16 Dec 2024 15:45)  T(F): 97.8 (17 Dec 2024 05:00), Max: 99.9 (16 Dec 2024 15:45)  HR: 78 (17 Dec 2024 05:00) (75 - 98)  BP: 101/49 (17 Dec 2024 05:00) (101/49 - 115/76)  BP(mean): 64 (17 Dec 2024 05:00) (64 - 103)  RR: 24 (17 Dec 2024 05:00) (13 - 24)  SpO2: 100% (17 Dec 2024 05:00) (99% - 100%)    Parameters below as of 17 Dec 2024 05:00  Patient On (Oxygen Delivery Method): room air    PHYSICAL EXAM:  Awake Alert Age Appopriate  PERRL, EOMI, No facial droop, Tongue midline  Normal Tone 5/5 strength equally  Aquacel lumbar dressing in place  Colostomy in placed        DIET:      MEDICATIONS  (STANDING):  acetaminophen   IV Intermittent - Peds. 750 milliGRAM(s) IV Intermittent every 6 hours  cetirizine Oral Tab/Cap - Peds 10 milliGRAM(s) Oral daily  dextrose 5% + sodium chloride 0.9% with potassium chloride 20 mEq/L. - Pediatric 1000 milliLiter(s) (90 mL/Hr) IV Continuous <Continuous>  diazepam  Oral Tab/Cap - Peds 5 milliGRAM(s) Oral every 6 hours  heparin   Infusion - Pediatric 0.059 Unit(s)/kG/Hr (3 mL/Hr) IV Continuous <Continuous>  ketorolac IV Push - Peds. 25 milliGRAM(s) IV Push every 6 hours  loperamide Oral Tab/Cap - Peds 16 milliGRAM(s) Oral two times a day  mesalamine ER Oral Tab/Cap - Peds 1000 milliGRAM(s) Oral daily  rifAXIMin Oral Tab/Cap - Peds 550 milliGRAM(s) Oral <User Schedule>  sodium chloride 0.9% - Pediatric 250 milliLiter(s) (3 mL/Hr) IV Continuous <Continuous>    MEDICATIONS  (PRN):  Methocarbamol 500 mG 1000 milliGRAM(s) 1000 milliGRAM(s) Oral three times a day PRN as per neuro  oxyCODONE   IR Oral Tab/Cap - Peds 5 milliGRAM(s) Oral every 4 hours PRN Severe Pain (7 - 10)  polyethylene glycol 3350 Oral Powder - Peds 17 Gram(s) Oral daily PRN Constipation                            10.0   9.55  )-----------( 290      ( 16 Dec 2024 21:23 )             30.7       RADIOLGY:   < from: CT Lumbar Spine No Cont (11.19.22 @ 05:58) >    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  Status post posterior spinal fusion with pedicle screws and connecting   rods from T11 to L3.  No focal collection or hardware malalignment.    Acute moderately severe compression fracture of the L1 vertebral body   with  retropulsion resulting in moderate focal canal stenosis.    < end of copied text >  
Interval/Overnight Events: Tolerated procedure well. Pain control adequate     ===========================RESPIRATORY==========================  RR: 17 (12-17-24 @ 15:00) (12 - 24)  SpO2: 100% (12-17-24 @ 14:00) (99% - 100%)    Respiratory Support: Room air     cetirizine Oral Tab/Cap - Peds 10 milliGRAM(s) Oral daily  [x] Airway Clearance Discussed  Extubation Readiness:  Not Applicable       =========================CARDIOVASCULAR========================  HR: 83 (12-17-24 @ 15:00) (74 - 93)  BP: 114/65 (12-17-24 @ 14:00) (96/56 - 115/76)  ABP: 114/48 (12-17-24 @ 15:00) (90/49 - 128/59)    =====================HEMATOLOGY/ONCOLOGY=====================  Transfusions:	None post-op   DVT Prophylaxis: Mobilization   heparin   Infusion - Pediatric 0.059 Unit(s)/kG/Hr IV Continuous <Continuous>    ========================INFECTIOUS DISEASE=======================  T(C): 36.9 (12-17-24 @ 14:00), Max: 37.4 (12-16-24 @ 17:35)  T(F): 98.4 (12-17-24 @ 14:00), Max: 99.3 (12-16-24 @ 17:35)    rifAXIMin Oral Tab/Cap - Peds 550 milliGRAM(s) Oral <User Schedule>    ==================FLUIDS/ELECTROLYTES/NUTRITION=================  I&O's Summary    16 Dec 2024 07:01  -  17 Dec 2024 07:00  --------------------------------------------------------  IN: 1755 mL / OUT: 2020 mL / NET: -265 mL    17 Dec 2024 07:01  -  17 Dec 2024 16:30  --------------------------------------------------------  IN: 747 mL / OUT: 780 mL / NET: -33 mL      Diet: PO ad caroline     dextrose 5% + sodium chloride 0.9% with potassium chloride 20 mEq/L. - Pediatric 1000 milliLiter(s) IV Continuous <Continuous>  polyethylene glycol 3350 Oral Powder - Peds 17 Gram(s) Oral daily PRN  sodium chloride 0.9% - Pediatric 250 milliLiter(s) IV Continuous <Continuous>  Comments:    ==========================NEUROLOGY===========================  [ ] SBS:		[ ] DIAMOND-1:	[ ] BIS:	[ ] CAPD:  acetaminophen   IV Intermittent - Peds. 750 milliGRAM(s) IV Intermittent every 6 hours  diazepam  Oral Tab/Cap - Peds 5 milliGRAM(s) Oral every 6 hours  ketorolac IV Push - Peds. 25 milliGRAM(s) IV Push every 6 hours  oxyCODONE   IR Oral Tab/Cap - Peds 5 milliGRAM(s) Oral every 4 hours PRN  [x] Adequacy of sedation and pain control has been assessed and adjusted  Comments:    OTHER MEDICATIONS:  Methocarbamol 500 mG 1000 milliGRAM(s) 1000 milliGRAM(s) Oral three times a day PRN    =========================PATIENT CARE==========================  [ ] There are pressure ulcers/areas of breakdown that are being addressed.  [x] Preventative measures are being taken to decrease risk for skin breakdown.  [x] Necessity of urinary, arterial, and venous catheters discussed    =========================PHYSICAL EXAM=========================  · Constitutional well-groomed; no distress  · Eyes EOMI; conjunctiva clear  · Respiratory clear to auscultation bilaterally; no wheezes; no rales; no rhonchi; no respiratory distress; no use of accessory muscles  · Cardiovascular regular rate and rhythm; S1 S2 present; no gallops; no rub; no murmur  · Gastrointestinal soft; nontender; nondistended; normal active bowel sounds  · Gastrointestinal Colostomy bag, pink moist stoma, small amount of pasty/loose yellowish stool in bag  · Genitourinary Comments clear urine in bag- no gross hematuria noted  · Neurological cranial nerves II-XII intact; sensation intact  · Skin Aquacel dressing with hemovac to spine. C/D/I  · Musculoskeletal strength 5/5 bilateral upper extremities; strength 5/5 bilateral lower extremities  · Psychiatric normal affect; alert and oriented x3; normal behavior    ===============================================================  LABS:  ABG - ( 16 Dec 2024 13:33 )  pH: 7.32  /  pCO2: 33    /  pO2: 279   / HCO3: 17    / Base Excess: -8.2  /  SaO2: 99.4  / Lactate: x                                                10.0                  Neurophils% (auto):   x      (12-16 @ 21:23):    9.55 )-----------(290          Lymphocytes% (auto):  x                                             30.7                   Eosinphils% (auto):   x        Manual%: Neutrophils x    ; Lymphocytes x    ; Eosinophils x    ; Bands%: x    ; Blasts x          RECENT CULTURES:      IMAGING STUDIES:    Parent/Guardian is at the bedside updated as to the progress/plan of care    The patient is improving but requires continued monitoring and adjustment of therapy
Simon is a 19 year old female with history of cloacal extrophy, bifid uterus, imperforated anus and tethered cord for which she has had multiple surgeries without complication. She has history of spondylosis of the lumbar spine for which she underwent L1 vertebral column resection, T11-L3 stabilization and fusion on 11/10/2022 with Dr. May and Sam. She continues to have back pain.     She is now postoperative day 0 having undergone L3 to iliac instrumentation and fusion under general endotracheal anesthesia. There were no reports of surgical or anesthesia complications. Blood loss was 300ml.      The surgery was performed under Total Intravenous Anesthesia with midazolam (2mg), propofol, fentanyl (100mcg), hydromorphone (1mg), remifenanil and acetaminophen.    In the recovery room, she was complaining of significant surgical pain that was sharp and located to the surgery. She received IV methadone 5mg with significant relief of her pain.     At present, she is resting comfortably in bed and able to move around in bed without significant pain. In the past, acetaminophen, NSAIDs, muscle relaxants, opioids including hydromorphone and oxycodone have all been used successfully after surgery to treat her pain. Her home meds include celebrex, acetaminophen, methocarbamol and diazepam. She denies chronic opioid use.     On exam, her vital signs are within normal limits. She is in no apparent distress. Respirations regular, full and non-labored. Lower extremity muscle strength is 5/5 bilateral and sensation of the lower extremities is grossly intact.     Assessment and Plan:  Postoperative day 0 from L1 to iliac fusion. Surgical pain well controlled.     1) Methadone 5mg (0.1mg/kg) IV every 6 hours x 3 doses  2) Oxycodone 5mg PO every 4 hours prn for severe breakthrough pain   3) Acetaminophen 750mg (15mg/kg) IV every 6 hours for 24 hours then transition to PO   4) Ketorolac 25mg (0.5mg/kg) IV every 6 hours for 48 hours then transition to ibuprofen.   5) Diazepam 5mg PO every 6 hours.  6) Methocarbamol 1000mg PO every 8 hours prn muscle spasms.     7) Encourage activity and ambulation. Provided exercises to be performed while in bed. Recommend ambulation on postoperative day 1 with assistance from nursing and physical therapy.   8) Recommend incentive spirometry.     Care plan discussed with patient and family and all questions answered. Recommendations discussed with primary service.     Thank you for the opportunity to participate in the care of this patient. Please, do not hesitate to reach out to the pediatric pain service if any questions or concerns.     Spectra Link: [phone]  Pager: [phone]  Off hours: please on-call pediatric anesthesiologist                  
PAST 24hr EVENTS: No acute neurosurgical events overnight. Pt doing well, no complaints.    Vital Signs Last 24 Hrs  T(C): 36.5 (19 Dec 2024 05:25), Max: 37 (19 Dec 2024 01:05)  T(F): 97.7 (19 Dec 2024 05:25), Max: 98.6 (19 Dec 2024 01:05)  HR: 83 (19 Dec 2024 05:25) (83 - 100)  BP: 124/53 (19 Dec 2024 05:25) (99/60 - 124/53)  BP(mean): --  RR: 18 (19 Dec 2024 05:25) (18 - 18)  SpO2: 99% (19 Dec 2024 05:25) (97% - 99%)    Parameters below as of 18 Dec 2024 13:53  Patient On (Oxygen Delivery Method): room air      I&O's Summary    18 Dec 2024 07:01  -  19 Dec 2024 07:00  --------------------------------------------------------  IN: 0 mL / OUT: 870 mL / NET: -870 mL        MEDICATIONS  (STANDING):  acetaminophen   Oral Tab/Cap - Peds. 650 milliGRAM(s) Oral every 6 hours  cetirizine Oral Tab/Cap - Peds 10 milliGRAM(s) Oral daily  diazepam  Oral Tab/Cap - Peds 5 milliGRAM(s) Oral every 6 hours  loperamide Oral Tab/Cap - Peds 16 milliGRAM(s) Oral two times a day  rifAXIMin Oral Tab/Cap - Peds 550 milliGRAM(s) Oral <User Schedule>    MEDICATIONS  (PRN):  Methocarbamol 500 mG 1000 milliGRAM(s) 1000 milliGRAM(s) Oral three times a day PRN as per neuro  oxyCODONE   IR Oral Tab/Cap - Peds 5 milliGRAM(s) Oral every 4 hours PRN Severe Pain (7 - 10)  polyethylene glycol 3350 Oral Powder - Peds 17 Gram(s) Oral daily PRN Constipation      PHYSICAL EXAM:   AOx3, Following Commands, Face symmetrical  EOMI, PERRL, CN 2-12 intact     RUE MOTOR:   Delt 5/5  Bicep 5/5  Tricep 5/5      HG 5/5      LUE MOTOR:   Delt 5/5  Bicep 5/5   Tricep 5/5     HG 5/5     RLE MOTOR:   HF 5/5            KF 5/5          KE 5/5         DF 5/5         PF 5/5    EHL 5/5    LLE MOTOR:   HF 5/5        KF 5/5          KE 5/5            DF 5/5            PF 5/5       EHL 5/5      SENSATION: intact to light touch     Incision C/D/I    RADIOLOGY:  
SUBJECTIVE EVENTS: doing well   Pain well controlled    Vital Signs Last 24 Hrs  T(C): 36.6 (17 Dec 2024 05:00), Max: 37.7 (16 Dec 2024 15:45)  T(F): 97.8 (17 Dec 2024 05:00), Max: 99.9 (16 Dec 2024 15:45)  HR: 78 (17 Dec 2024 05:00) (75 - 98)  BP: 101/49 (17 Dec 2024 05:00) (101/49 - 115/76)  BP(mean): 64 (17 Dec 2024 05:00) (64 - 103)  RR: 24 (17 Dec 2024 05:00) (13 - 24)  SpO2: 100% (17 Dec 2024 05:00) (99% - 100%)    Parameters below as of 17 Dec 2024 05:00  Patient On (Oxygen Delivery Method): room air    HEMOVAC 170cc since surgery      PHYSICAL EXAM:  Awake Alert Age Appopriate  PERRL, EOMI, No facial droop, Tongue midline  Normal Tone 5/5 strength equally  Aquacel lumbar dressing in place  Colostomy in placed        DIET:      MEDICATIONS  (STANDING):  acetaminophen   IV Intermittent - Peds. 750 milliGRAM(s) IV Intermittent every 6 hours  cetirizine Oral Tab/Cap - Peds 10 milliGRAM(s) Oral daily  dextrose 5% + sodium chloride 0.9% with potassium chloride 20 mEq/L. - Pediatric 1000 milliLiter(s) (90 mL/Hr) IV Continuous <Continuous>  diazepam  Oral Tab/Cap - Peds 5 milliGRAM(s) Oral every 6 hours  heparin   Infusion - Pediatric 0.059 Unit(s)/kG/Hr (3 mL/Hr) IV Continuous <Continuous>  ketorolac IV Push - Peds. 25 milliGRAM(s) IV Push every 6 hours  loperamide Oral Tab/Cap - Peds 16 milliGRAM(s) Oral two times a day  mesalamine ER Oral Tab/Cap - Peds 1000 milliGRAM(s) Oral daily  rifAXIMin Oral Tab/Cap - Peds 550 milliGRAM(s) Oral <User Schedule>  sodium chloride 0.9% - Pediatric 250 milliLiter(s) (3 mL/Hr) IV Continuous <Continuous>    MEDICATIONS  (PRN):  Methocarbamol 500 mG 1000 milliGRAM(s) 1000 milliGRAM(s) Oral three times a day PRN as per neuro  oxyCODONE   IR Oral Tab/Cap - Peds 5 milliGRAM(s) Oral every 4 hours PRN Severe Pain (7 - 10)  polyethylene glycol 3350 Oral Powder - Peds 17 Gram(s) Oral daily PRN Constipation                            10.0   9.55  )-----------( 290      ( 16 Dec 2024 21:23 )             30.7       RADIOLGY:   < from: CT Lumbar Spine No Cont (11.19.22 @ 05:58) >    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  Status post posterior spinal fusion with pedicle screws and connecting   rods from T11 to L3.  No focal collection or hardware malalignment.    Acute moderately severe compression fracture of the L1 vertebral body   with  retropulsion resulting in moderate focal canal stenosis.    < end of copied text >

## 2024-12-20 ENCOUNTER — TRANSCRIPTION ENCOUNTER (OUTPATIENT)
Age: 19
End: 2024-12-20

## 2024-12-20 VITALS
RESPIRATION RATE: 16 BRPM | HEART RATE: 75 BPM | DIASTOLIC BLOOD PRESSURE: 63 MMHG | OXYGEN SATURATION: 99 % | SYSTOLIC BLOOD PRESSURE: 101 MMHG | TEMPERATURE: 98 F

## 2024-12-20 RX ORDER — OXYCODONE HYDROCHLORIDE 30 MG/1
1 TABLET ORAL
Qty: 0 | Refills: 0 | DISCHARGE
Start: 2024-12-20

## 2024-12-20 RX ORDER — OXYCODONE HYDROCHLORIDE 30 MG/1
1 TABLET ORAL
Qty: 18 | Refills: 0
Start: 2024-12-20 | End: 2024-12-22

## 2024-12-20 RX ORDER — NORETHINDRONE 0.35MG(21)
1 TABLET ORAL
Qty: 180 | Refills: 0
Start: 2024-12-20 | End: 2025-03-19

## 2024-12-20 RX ORDER — DIAZEPAM 10 MG/1
1 TABLET ORAL
Qty: 12 | Refills: 0
Start: 2024-12-20 | End: 2024-12-22

## 2024-12-20 RX ORDER — NALOXONE HCL 0.4 MG/ML
1 AMPUL (ML) INJECTION
Qty: 1 | Refills: 0
Start: 2024-12-20

## 2024-12-20 RX ADMIN — Medication 16 MILLIGRAM(S): at 09:04

## 2024-12-20 RX ADMIN — DIAZEPAM 5 MILLIGRAM(S): 10 TABLET ORAL at 03:30

## 2024-12-20 RX ADMIN — OXYCODONE HYDROCHLORIDE 5 MILLIGRAM(S): 30 TABLET ORAL at 10:12

## 2024-12-20 RX ADMIN — ACETAMINOPHEN 500MG 650 MILLIGRAM(S): 500 TABLET, COATED ORAL at 06:02

## 2024-12-20 RX ADMIN — DIAZEPAM 5 MILLIGRAM(S): 10 TABLET ORAL at 09:04

## 2024-12-20 RX ADMIN — ACETAMINOPHEN 500MG 650 MILLIGRAM(S): 500 TABLET, COATED ORAL at 00:31

## 2024-12-20 RX ADMIN — CETIRIZINE HYDROCHLORIDE 10 MILLIGRAM(S): 10 TABLET ORAL at 09:04

## 2024-12-20 RX ADMIN — OXYCODONE HYDROCHLORIDE 5 MILLIGRAM(S): 30 TABLET ORAL at 12:00

## 2024-12-20 NOTE — DISCHARGE NOTE PROVIDER - HOSPITAL COURSE
18 y/o F with PMHx of cloacal extrophy s/p repair, bifid uterus s/p reconstruction, imperforated anus s/p reconstruction, tethered cord s/p repair, s/p Mitrofanoff colostomy, hx of PRBC transfusions and iron deficiency anemia due to GI bleeds. Patient s/p L1 vertebral column resection, T11-L3 stabilization and fusion on 11/10/2022 with Dr. May and Sam at [facility] [place]. 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 She underwent left midfoot osteoplasty, tibialis anterior tendon transfer in March 2023 with Dr. Pichardo. s/p Percutaneous Cystolitholapaxy, bladder stone fragmentation and removal 2/16/23 at [facility].     12/16 S/p OR for L3-iliac instrumentation and fusion, closed with staples. Exam intact  12/18 POD #2 HMV output 280cc/24H  12/19 POD 3 exam intact, HMV 90cc  12/20: POD4, exam stable, HMV 85cc/24hr, d/c planning after drain removal later today

## 2024-12-20 NOTE — DISCHARGE NOTE PROVIDER - NSDCMRMEDTOKEN_GEN_ALL_CORE_FT
acetaminophen 325 mg oral tablet: 2 tab(s) orally every 6 hours, As needed, Mild Pain (1 - 3)  Aygestin 5 mg oral tablet: 1 tab(s) orally 2 times a day  cetirizine 10 mg oral tablet: 1 tab(s) orally once a day  Flexeril 5 mg oral tablet: 1 tab(s) orally 2 times a day  loperamide 2 mg oral tablet: 8 tab(s) orally 2 times a day  Narcan 4 mg/0.1 mL nasal spray: 1 spray(s) intranasally prn for symptoms of respiratory depression  ondansetron 4 mg oral tablet: 2 tab(s) orally once a day as needed for  nausea  oxyCODONE 5 mg oral tablet: 1 tab(s) orally every 4 hours as needed for Severe Pain (7 - 10) MDD: 6 tablets  Pentasa 500 mg oral capsule, extended release: 2 cap(s) orally once a day  Robaxin 500 mg oral tablet: 2 tab(s) orally every 8 hours  Valium 5 mg oral tablet: 1 tab(s) orally every 6 hours as needed for  muscle spasm MDD: 4 tablets  Vitamin D3 1000 intl units oral capsule: 1 cap(s) orally once a day  Xifaxan 550 mg oral tablet: 1 tab(s) orally once a day   acetaminophen 325 mg oral tablet: 2 tab(s) orally every 6 hours, As needed, Mild Pain (1 - 3)  Aygestin 5 mg oral tablet: 1 tab(s) orally 2 times a day  cetirizine 10 mg oral tablet: 1 tab(s) orally once a day  Flexeril 5 mg oral tablet: 1 tab(s) orally 2 times a day  loperamide 2 mg oral tablet: 8 tab(s) orally 2 times a day  Narcan 4 mg/0.1 mL nasal spray: 1 spray(s) intranasally prn for symptoms of respiratory depression  norethindrone acetate 5 mg oral tablet: 1 tab(s) orally 2 times a day  ondansetron 4 mg oral tablet: 2 tab(s) orally once a day as needed for  nausea  oxyCODONE 5 mg oral tablet: 1 tab(s) orally every 4 hours as needed for Severe Pain (7 - 10) MDD: 6 tablets  Pentasa 500 mg oral capsule, extended release: 2 cap(s) orally once a day  Robaxin 500 mg oral tablet: 2 tab(s) orally every 8 hours  Valium 5 mg oral tablet: 1 tab(s) orally every 6 hours as needed for  muscle spasm MDD: 4 tablets  Vitamin D3 1000 intl units oral capsule: 1 cap(s) orally once a day  Xifaxan 550 mg oral tablet: 1 tab(s) orally once a day

## 2024-12-20 NOTE — PROGRESS NOTE ADULT - ASSESSMENT
20 y/o F with PMHx of cloacal extrophy s/p repair, bifid uterus s/p reconstruction, imperforated anus s/p reconstruction, tethered cord s/p repair, s/p Mitrofanoff colostomy, hx of PRBC transfusions and iron deficiency anemia due to GI bleeds. Patient s/p L1 vertebral column resection, T11-L3 stabilization and fusion on 11/10/2022 with Dr. May and Sam at [facility] [place]. 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 She underwent left midfoot osteoplasty, tibialis anterior tendon transfer in March 2023 with Dr. Pichardo. s/p Percutaneous Cystolitholapaxy, bladder stone fragmentation and removal 2/16/23 at [facility].     12/16 S/p OR for L3-iliac instrumentation and fusion, closed with staples. Exam intact  12/18 POD #2 HMV output 280cc/24H  12/19 POD 3 exam intact, HMV 90cc  12/20: POD4, exam stable, HMV 85cc/24hr, d/c planning after drain removal later today

## 2024-12-20 NOTE — DISCHARGE NOTE PROVIDER - CARE PROVIDER_API CALL
George May  Neurosurgery  [address] [place] [address], [facility] [address]  [place] [address]  Phone: [phone]  Fax: [phone]  Follow Up Time: 1 week

## 2024-12-20 NOTE — DISCHARGE NOTE PROVIDER - NSDCCPCAREPLAN_GEN_ALL_CORE_FT
PRINCIPAL DISCHARGE DIAGNOSIS  Diagnosis: Lumbar spondylosis  Assessment and Plan of Treatment:

## 2024-12-20 NOTE — DISCHARGE NOTE PROVIDER - NSDCFUSCHEDAPPT_GEN_ALL_CORE_FT
Ananya Thibodeaux  [facility]  [facility] [phone]   Scheduled Appointment: 01/14/2025

## 2024-12-20 NOTE — PROGRESS NOTE ADULT - SUBJECTIVE AND OBJECTIVE BOX
PAST 24HR EVENTS:  s/p L3-iliac fusion POD4: no significant events overnight. HMV 85cc/24hr. Per attending Dr. May can d/c drain later today and d/c patient if patient feels ready.     Vital Signs Last 24 Hrs  T(C): 36.5 (20 Dec 2024 05:26), Max: 36.7 (19 Dec 2024 13:38)  T(F): 97.7 (20 Dec 2024 05:26), Max: 98.1 (19 Dec 2024 13:38)  HR: 86 (20 Dec 2024 05:26) (86 - 102)  BP: 98/60 (20 Dec 2024 05:26) (98/60 - 112/75)  BP(mean): --  RR: 18 (20 Dec 2024 05:26) (18 - 19)  SpO2: 99% (20 Dec 2024 05:26) (97% - 100%)    Parameters below as of 20 Dec 2024 00:40  Patient On (Oxygen Delivery Method): room air        MEDS:   acetaminophen   Oral Tab/Cap - Peds. 650 milliGRAM(s) Oral every 6 hours  cetirizine Oral Tab/Cap - Peds 10 milliGRAM(s) Oral daily  diazepam  Oral Tab/Cap - Peds 5 milliGRAM(s) Oral every 6 hours  loperamide Oral Tab/Cap - Peds 16 milliGRAM(s) Oral two times a day  Methocarbamol 500 mG 1000 milliGRAM(s) 1000 milliGRAM(s) Oral three times a day PRN  oxyCODONE   IR Oral Tab/Cap - Peds 5 milliGRAM(s) Oral every 4 hours PRN  polyethylene glycol 3350 Oral Powder - Peds 17 Gram(s) Oral daily PRN  rifAXIMin Oral Tab/Cap - Peds 550 milliGRAM(s) Oral <User Schedule>      LABS:          RADIOLOGY:       PHYSICAL EXAM:  Aox3, following commands  MAEx4 spont with good strength  Incision c/d/i   Neg PD PAST 24HR EVENTS:  s/p L3-iliac fusion POD4: no significant events overnight. HMV 85cc/24hr. Per attending Dr. May can d/c drain later today and d/c patient if patient feels ready.     Vital Signs Last 24 Hrs  T(C): 36.5 (20 Dec 2024 05:26), Max: 36.7 (19 Dec 2024 13:38)  T(F): 97.7 (20 Dec 2024 05:26), Max: 98.1 (19 Dec 2024 13:38)  HR: 86 (20 Dec 2024 05:26) (86 - 102)  BP: 98/60 (20 Dec 2024 05:26) (98/60 - 112/75)  BP(mean): --  RR: 18 (20 Dec 2024 05:26) (18 - 19)  SpO2: 99% (20 Dec 2024 05:26) (97% - 100%)    Parameters below as of 20 Dec 2024 00:40  Patient On (Oxygen Delivery Method): room air        MEDS:   acetaminophen   Oral Tab/Cap - Peds. 650 milliGRAM(s) Oral every 6 hours  cetirizine Oral Tab/Cap - Peds 10 milliGRAM(s) Oral daily  diazepam  Oral Tab/Cap - Peds 5 milliGRAM(s) Oral every 6 hours  loperamide Oral Tab/Cap - Peds 16 milliGRAM(s) Oral two times a day  Methocarbamol 500 mG 1000 milliGRAM(s) 1000 milliGRAM(s) Oral three times a day PRN  oxyCODONE   IR Oral Tab/Cap - Peds 5 milliGRAM(s) Oral every 4 hours PRN  polyethylene glycol 3350 Oral Powder - Peds 17 Gram(s) Oral daily PRN  rifAXIMin Oral Tab/Cap - Peds 550 milliGRAM(s) Oral <User Schedule>      LABS:          RADIOLOGY:       PHYSICAL EXAM:  Aox3, following commands  MAEx4 spont with good strength  Dressing c/d/i, aquacel   Neg PD

## 2024-12-20 NOTE — DISCHARGE NOTE NURSING/CASE MANAGEMENT/SOCIAL WORK - NSDCPEFALRISK_GEN_ALL_CORE
For information on Fall & Injury Prevention, visit: [email or web address] OR  [email or web address] OR  https://www.cdc.gov/steadi/patient.html

## 2024-12-20 NOTE — DISCHARGE NOTE PROVIDER - NSDCADMDATE_GEN_ALL_CORE_FT
Arrived to the [facility]. D/C pump completed. Patient tolerated well. Any issues or concerns during appointment: none. Patient aware of next infusion appointment on 12/19 (date) at 8:00 AM (time). Discharged ambulatory.
16-Dec-2024 05:46

## 2024-12-20 NOTE — DISCHARGE NOTE NURSING/CASE MANAGEMENT/SOCIAL WORK - PATIENT PORTAL LINK FT
You can access the FollowMyHealth Patient Portal offered by [facility] by registering at the following website: [email or web address]. By joining [facility]’s FollowMyHealth portal, you will also be able to view your health information using other applications (apps) compatible with our system.

## 2024-12-20 NOTE — DISCHARGE NOTE NURSING/CASE MANAGEMENT/SOCIAL WORK - FINANCIAL ASSISTANCE
[facility] provides services at a reduced cost to those who are determined to be eligible through [facility]’s financial assistance program. Information regarding [facility]’s financial assistance program can be found by going to [email or web address] or by calling [phone].

## 2024-12-20 NOTE — PROGRESS NOTE ADULT - PROBLEM SELECTOR PLAN 1
- Cont home bowel regimen  - Neurochecks q4h   - Pain control per pain team   - PT/OT no needs  - Continue to monitor drain output   -dc HMV drain today and dc planning after removal     [id number]    Case discussed with attending neurosurgeon Dr. Vargas/ [place].

## 2024-12-20 NOTE — DISCHARGE NOTE PROVIDER - NSDCFUADDINST_GEN_ALL_CORE_FT
- You had surgery on 12/16/24. The surgery you had was L3-Iliac Fusion.     - Remove bandage from incision site on post op day 3 if it was not removed by the surgical team prior to discharge. Once removed, incision site does not need a bandage or ointment on it. If you have steri strips (small, skinny beige strips), they will eventually fall off over time. Do not pull at steri strips. If steri strips are more than jail off, you may remove them. Do not touch or scratch incision to prevent infection.    If you see metallic staples or black stitches, these will need to be removed in the office 7-14 days after surgery. Your surgeon will tell you at your follow up appt when your sutures/staples will be removed, if applicable.  Do not pick or scratch at incision.     - Shower daily with shampoo/soap on post operative day 4 (12/20/24 ) Avoid long soaks and do not submerge incision in water (no baths.) Allow soap and water to run over the incision. Pat incision area dry with clean towel- do not scrub. Please shower regularly to ensure incision stays clean to avoid post operative infections.  You may have a body shower daily, as long as your head incision is covered by a shower cap and does not get wet until post op day 4.     - Notify your surgeon if you notice increased redness, drainage or your incision area opening.     - Return to ER immediately for high fevers, severe headache, vomiting, lethargy or weakness    - Please call your neurosurgeon following discharge to make follow up appointment in 1 week after discharge unless otherwise specified. See contact information.    - Prescription post operative medication has been sent to [facility] in the hospital. All post operative prescriptions should be picked up before departing the hospital. You can also take over the counter tylenol for pain as needed.     - Ambulate as tolerate. Continue with all "activities of daily living." Avoid strenuous activity or heavy lifting until cleared for additional activity at your follow up appointment. You cannot drive while taking narcotics (oxycodone, valium, etc.)     - Do not return to work or school until cleared by your neurosurgeon at your follow up visit unless specified to you during your hospital stay    - Do not take any blood thinning medications such as aspirin, motrin, ibuprofen, warfarin, coumadin, plavix, heparin, lovenox, Xarelto, Eliquis etc. until cleared by your neurosurgeon    - Surgery, anesthesia, and pain medications can cause constipation. Please take over the counter stool softeners daily until regular bowel movements are achieved. Examples include Miralax, Senna, Colace, Milk of Magnesia, or Dulcolax suppositories. Please consult drug store pharmacist or pediatrician if there are question about dosing if the patient is pediatric.

## 2024-12-24 PROBLEM — M47.816 SPONDYLOSIS WITHOUT MYELOPATHY OR RADICULOPATHY, LUMBAR REGION: Chronic | Status: ACTIVE | Noted: 2024-12-11

## 2025-01-01 ENCOUNTER — OUTPATIENT (OUTPATIENT)
Dept: OUTPATIENT SERVICES | Age: 20
LOS: 1 days | Discharge: HOME | End: 2025-01-01

## 2025-01-01 DIAGNOSIS — Q06.9 CONGENITAL MALFORMATION OF SPINAL CORD, UNSPECIFIED: Chronic | ICD-10-CM

## 2025-01-01 DIAGNOSIS — N21.0 CALCULUS IN BLADDER: Chronic | ICD-10-CM

## 2025-01-01 DIAGNOSIS — T14.90 INJURY, UNSPECIFIED: Chronic | ICD-10-CM

## 2025-01-01 DIAGNOSIS — N32.2 VESICAL FISTULA, NOT ELSEWHERE CLASSIFIED: Chronic | ICD-10-CM

## 2025-01-01 DIAGNOSIS — Q45.8 OTHER SPECIFIED CONGENITAL MALFORMATIONS OF DIGESTIVE SYSTEM: Chronic | ICD-10-CM

## 2025-01-01 DIAGNOSIS — Q64.12 CLOACAL EXSTROPHY OF URINARY BLADDER: Chronic | ICD-10-CM

## 2025-01-01 DIAGNOSIS — Q42.3 CONGENITAL ABSENCE, ATRESIA AND STENOSIS OF ANUS WITHOUT FISTULA: Chronic | ICD-10-CM

## 2025-01-01 DIAGNOSIS — N35.9 URETHRAL STRICTURE, UNSPECIFIED: Chronic | ICD-10-CM

## 2025-01-01 DIAGNOSIS — M95.5 ACQUIRED DEFORMITY OF PELVIS: Chronic | ICD-10-CM

## 2025-01-01 DIAGNOSIS — D22.9 MELANOCYTIC NEVI, UNSPECIFIED: Chronic | ICD-10-CM

## 2025-01-01 DIAGNOSIS — M20.60 ACQUIRED DEFORMITIES OF TOE(S), UNSPECIFIED, UNSPECIFIED FOOT: Chronic | ICD-10-CM

## 2025-01-01 DIAGNOSIS — Z98.89 OTHER SPECIFIED POSTPROCEDURAL STATES: Chronic | ICD-10-CM

## 2025-01-01 DIAGNOSIS — Q06.8 OTHER SPECIFIED CONGENITAL MALFORMATIONS OF SPINAL CORD: Chronic | ICD-10-CM

## 2025-01-01 DIAGNOSIS — Z98.890 OTHER SPECIFIED POSTPROCEDURAL STATES: Chronic | ICD-10-CM

## 2025-01-03 ENCOUNTER — APPOINTMENT (OUTPATIENT)
Dept: OBGYN | Facility: CLINIC | Age: 20
End: 2025-01-03

## 2025-01-03 DIAGNOSIS — Z87.2 PERSONAL HISTORY OF DISEASES OF THE SKIN AND SUBCUTANEOUS TISSUE: ICD-10-CM

## 2025-01-03 DIAGNOSIS — K90.822 SHORT BOWEL SYNDROME WITHOUT COLON IN CONTINUITY: ICD-10-CM

## 2025-01-03 DIAGNOSIS — Q65.89 OTHER SPECIFIED CONGENITAL DEFORMITIES OF HIP: ICD-10-CM

## 2025-01-03 DIAGNOSIS — Q06.8 OTHER SPECIFIED CONGENITAL MALFORMATIONS OF SPINAL CORD: ICD-10-CM

## 2025-01-03 DIAGNOSIS — N89.5 STRICTURE AND ATRESIA OF VAGINA: ICD-10-CM

## 2025-01-03 DIAGNOSIS — N94.89 OTHER SPECIFIED CONDITIONS ASSOCIATED WITH FEMALE GENITAL ORGANS AND MENSTRUAL CYCLE: ICD-10-CM

## 2025-01-03 DIAGNOSIS — Z87.898 PERSONAL HISTORY OF OTHER SPECIFIED CONDITIONS: ICD-10-CM

## 2025-01-03 DIAGNOSIS — Q51.28 OTHER AND UNSPECIFIED DOUBLING OF UTERUS: ICD-10-CM

## 2025-01-03 DIAGNOSIS — N39.490 OVERFLOW INCONTINENCE: ICD-10-CM

## 2025-01-03 DIAGNOSIS — Z93.8 OTHER ARTIFICIAL OPENING STATUS: ICD-10-CM

## 2025-01-03 DIAGNOSIS — Q45.8 OTHER SPECIFIED CONGENITAL MALFORMATIONS OF DIGESTIVE SYSTEM: ICD-10-CM

## 2025-01-03 DIAGNOSIS — Z86.39 PERSONAL HISTORY OF OTHER ENDOCRINE, NUTRITIONAL AND METABOLIC DISEASE: ICD-10-CM

## 2025-01-03 DIAGNOSIS — Z01.818 ENCOUNTER FOR OTHER PREPROCEDURAL EXAMINATION: ICD-10-CM

## 2025-01-03 DIAGNOSIS — E55.9 VITAMIN D DEFICIENCY, UNSPECIFIED: ICD-10-CM

## 2025-01-03 DIAGNOSIS — Z98.1 ARTHRODESIS STATUS: ICD-10-CM

## 2025-01-03 DIAGNOSIS — Z86.19 PERSONAL HISTORY OF OTHER INFECTIOUS AND PARASITIC DISEASES: ICD-10-CM

## 2025-01-03 DIAGNOSIS — E61.1 IRON DEFICIENCY: ICD-10-CM

## 2025-01-03 DIAGNOSIS — Q64.12 OTHER SPECIFIED CONGENITAL MALFORMATIONS OF DIGESTIVE SYSTEM: ICD-10-CM

## 2025-01-03 DIAGNOSIS — Z87.09 PERSONAL HISTORY OF OTHER DISEASES OF THE RESPIRATORY SYSTEM: ICD-10-CM

## 2025-01-03 PROCEDURE — 99205 OFFICE O/P NEW HI 60 MIN: CPT

## 2025-01-23 ENCOUNTER — APPOINTMENT (OUTPATIENT)
Dept: PEDIATRIC GASTROENTEROLOGY | Facility: CLINIC | Age: 20
End: 2025-01-23
Payer: COMMERCIAL

## 2025-01-23 VITALS
HEART RATE: 114 BPM | HEIGHT: 57.36 IN | SYSTOLIC BLOOD PRESSURE: 128 MMHG | DIASTOLIC BLOOD PRESSURE: 69 MMHG | BODY MASS INDEX: 23.32 KG/M2 | WEIGHT: 109.57 LBS

## 2025-01-23 DIAGNOSIS — K63.3 ULCER OF INTESTINE: ICD-10-CM

## 2025-01-23 DIAGNOSIS — Q64.12 OTHER SPECIFIED CONGENITAL MALFORMATIONS OF DIGESTIVE SYSTEM: ICD-10-CM

## 2025-01-23 DIAGNOSIS — K59.89 OTHER SPECIFIED FUNCTIONAL INTESTINAL DISORDERS: ICD-10-CM

## 2025-01-23 DIAGNOSIS — E55.9 VITAMIN D DEFICIENCY, UNSPECIFIED: ICD-10-CM

## 2025-01-23 DIAGNOSIS — K50.00 CROHN'S DISEASE OF SMALL INTESTINE W/OUT COMPLICATIONS: ICD-10-CM

## 2025-01-23 DIAGNOSIS — Q45.8 OTHER SPECIFIED CONGENITAL MALFORMATIONS OF DIGESTIVE SYSTEM: ICD-10-CM

## 2025-01-23 DIAGNOSIS — E61.1 IRON DEFICIENCY: ICD-10-CM

## 2025-01-23 LAB
25(OH)D3 SERPL-MCNC: 16.1 NG/ML
ALBUMIN SERPL ELPH-MCNC: 4.5 G/DL
ALP BLD-CCNC: 137 U/L
ALT SERPL-CCNC: 55 U/L
ANION GAP SERPL CALC-SCNC: 16 MMOL/L
AST SERPL-CCNC: 33 U/L
BILIRUB SERPL-MCNC: 0.4 MG/DL
BUN SERPL-MCNC: 15 MG/DL
CALCIUM SERPL-MCNC: 9.4 MG/DL
CHLORIDE SERPL-SCNC: 104 MMOL/L
CO2 SERPL-SCNC: 17 MMOL/L
CREAT SERPL-MCNC: 0.76 MG/DL
EGFR: 115 ML/MIN/1.73M2
FERRITIN SERPL-MCNC: 29 NG/ML
FOLATE SERPL-MCNC: 11.7 NG/ML
GLUCOSE SERPL-MCNC: 113 MG/DL
HCT VFR BLD CALC: 37.1 %
HGB BLD-MCNC: 11.5 G/DL
INR PPP: 1 RATIO
IRON SATN MFR SERPL: 10 %
IRON SERPL-MCNC: 57 UG/DL
MCHC RBC-ENTMCNC: 29.3 PG
MCHC RBC-ENTMCNC: 31 G/DL
MCV RBC AUTO: 94.4 FL
PLATELET # BLD AUTO: 395 K/UL
POTASSIUM SERPL-SCNC: 4.1 MMOL/L
PROT SERPL-MCNC: 8 G/DL
PT BLD: 11.9 SEC
RBC # BLD: 3.93 M/UL
RBC # FLD: 12.3 %
SODIUM SERPL-SCNC: 136 MMOL/L
TIBC SERPL-MCNC: 588 UG/DL
UIBC SERPL-MCNC: 531 UG/DL
VIT B12 SERPL-MCNC: 302 PG/ML
WBC # FLD AUTO: 5.59 K/UL

## 2025-01-23 PROCEDURE — 99214 OFFICE O/P EST MOD 30 MIN: CPT

## 2025-01-23 RX ORDER — ERGOCALCIFEROL 1.25 MG/1
1.25 MG CAPSULE, LIQUID FILLED ORAL
Qty: 20 | Refills: 2 | Status: ACTIVE | COMMUNITY
Start: 2025-01-23 | End: 1900-01-01

## 2025-01-26 LAB
A-TOCOPHEROL VIT E SERPL-MCNC: 10.3 MG/L
BETA+GAMMA TOCOPHEROL SERPL-MCNC: 0.9 MG/L

## 2025-01-28 ENCOUNTER — TRANSCRIPTION ENCOUNTER (OUTPATIENT)
Age: 20
End: 2025-01-28

## 2025-01-28 RX ORDER — MEDROXYPROGESTERONE ACETATE 150 MG/ML
150 INJECTION, SUSPENSION INTRAMUSCULAR
Qty: 1 | Refills: 4 | Status: ACTIVE | COMMUNITY
Start: 2025-01-28 | End: 1900-01-01

## 2025-01-29 ENCOUNTER — TRANSCRIPTION ENCOUNTER (OUTPATIENT)
Age: 20
End: 2025-01-29

## 2025-01-29 RX ORDER — IRON SUCROSE 20 MG/ML
250 INJECTION, SOLUTION INTRAVENOUS ONCE
Refills: 0 | Status: DISCONTINUED | OUTPATIENT
Start: 2025-01-31 | End: 2025-03-31

## 2025-01-30 ENCOUNTER — NON-APPOINTMENT (OUTPATIENT)
Age: 20
End: 2025-01-30

## 2025-01-31 ENCOUNTER — APPOINTMENT (OUTPATIENT)
Dept: PEDIATRIC SURGERY | Facility: CLINIC | Age: 20
End: 2025-01-31
Payer: COMMERCIAL

## 2025-01-31 ENCOUNTER — APPOINTMENT (OUTPATIENT)
Dept: OBGYN | Facility: CLINIC | Age: 20
End: 2025-01-31
Payer: COMMERCIAL

## 2025-01-31 ENCOUNTER — APPOINTMENT (OUTPATIENT)
Dept: PEDIATRIC HEMATOLOGY/ONCOLOGY | Facility: CLINIC | Age: 20
End: 2025-01-31

## 2025-01-31 VITALS — TEMPERATURE: 97.88 F | WEIGHT: 108.25 LBS

## 2025-01-31 DIAGNOSIS — K52.9 NONINFECTIVE GASTROENTERITIS AND COLITIS, UNSPECIFIED: ICD-10-CM

## 2025-01-31 DIAGNOSIS — R19.7 DIARRHEA, UNSPECIFIED: ICD-10-CM

## 2025-01-31 DIAGNOSIS — Z93.3 COLOSTOMY STATUS: ICD-10-CM

## 2025-01-31 DIAGNOSIS — Z98.890 OTHER SPECIFIED POSTPROCEDURAL STATES: ICD-10-CM

## 2025-01-31 DIAGNOSIS — K90.822 SHORT BOWEL SYNDROME WITHOUT COLON IN CONTINUITY: ICD-10-CM

## 2025-01-31 PROCEDURE — 96372 THER/PROPH/DIAG INJ SC/IM: CPT

## 2025-01-31 PROCEDURE — 99214 OFFICE O/P EST MOD 30 MIN: CPT | Mod: 25

## 2025-01-31 RX ORDER — MEDROXYPROGESTERONE ACETATE 150 MG/ML
150 INJECTION, SUSPENSION INTRAMUSCULAR
Qty: 0 | Refills: 0 | Status: COMPLETED | OUTPATIENT
Start: 2025-01-31

## 2025-01-31 RX ADMIN — MEDROXYPROGESTERONE ACETATE 0 MG/ML: 150 INJECTION, SUSPENSION INTRAMUSCULAR at 00:00

## 2025-02-20 ENCOUNTER — APPOINTMENT (OUTPATIENT)
Dept: PEDIATRICS | Facility: CLINIC | Age: 20
End: 2025-02-20

## 2025-02-20 VITALS — TEMPERATURE: 208.76 F | WEIGHT: 103.7 LBS

## 2025-02-25 DIAGNOSIS — K52.9 NONINFECTIVE GASTROENTERITIS AND COLITIS, UNSPECIFIED: ICD-10-CM

## 2025-02-26 ENCOUNTER — APPOINTMENT (OUTPATIENT)
Dept: PEDIATRIC SURGERY | Facility: CLINIC | Age: 20
End: 2025-02-26
Payer: COMMERCIAL

## 2025-02-26 DIAGNOSIS — Q64.12 OTHER SPECIFIED CONGENITAL MALFORMATIONS OF DIGESTIVE SYSTEM: ICD-10-CM

## 2025-02-26 DIAGNOSIS — Q45.8 OTHER SPECIFIED CONGENITAL MALFORMATIONS OF DIGESTIVE SYSTEM: ICD-10-CM

## 2025-02-26 PROCEDURE — 99214 OFFICE O/P EST MOD 30 MIN: CPT | Mod: 95

## 2025-02-27 LAB
CDIFF BY PCR: NOT DETECTED
GI PCR PANEL: NOT DETECTED

## 2025-02-28 LAB — DEPRECATED O AND P PREP STL: NORMAL

## 2025-03-01 LAB — CALPROTECTIN FECAL: 47 UG/G

## 2025-03-04 LAB
OSMOLALITY STL: 398 MOSM/KG
PH STL: 6.1

## 2025-03-05 ENCOUNTER — NON-APPOINTMENT (OUTPATIENT)
Age: 20
End: 2025-03-05

## 2025-03-05 LAB
CHLORIDE STL-SCNC: 97 MMOL/L
POTASSIUM STL-SCNC: 14 MMOL/L
SODIUM STL-SCNC: 127 MMOL/L

## 2025-03-13 ENCOUNTER — APPOINTMENT (OUTPATIENT)
Dept: CT IMAGING | Facility: CLINIC | Age: 20
End: 2025-03-13
Payer: COMMERCIAL

## 2025-03-13 ENCOUNTER — RESULT REVIEW (OUTPATIENT)
Age: 20
End: 2025-03-13

## 2025-03-13 ENCOUNTER — OUTPATIENT (OUTPATIENT)
Dept: OUTPATIENT SERVICES | Facility: HOSPITAL | Age: 20
LOS: 1 days | End: 2025-03-13
Payer: COMMERCIAL

## 2025-03-13 DIAGNOSIS — Q64.12 CLOACAL EXSTROPHY OF URINARY BLADDER: ICD-10-CM

## 2025-03-13 DIAGNOSIS — Q42.3 CONGENITAL ABSENCE, ATRESIA AND STENOSIS OF ANUS WITHOUT FISTULA: Chronic | ICD-10-CM

## 2025-03-13 DIAGNOSIS — M95.5 ACQUIRED DEFORMITY OF PELVIS: Chronic | ICD-10-CM

## 2025-03-13 DIAGNOSIS — Q64.12 CLOACAL EXSTROPHY OF URINARY BLADDER: Chronic | ICD-10-CM

## 2025-03-13 DIAGNOSIS — Q06.9 CONGENITAL MALFORMATION OF SPINAL CORD, UNSPECIFIED: Chronic | ICD-10-CM

## 2025-03-13 DIAGNOSIS — Q45.8 OTHER SPECIFIED CONGENITAL MALFORMATIONS OF DIGESTIVE SYSTEM: ICD-10-CM

## 2025-03-13 DIAGNOSIS — Q45.8 OTHER SPECIFIED CONGENITAL MALFORMATIONS OF DIGESTIVE SYSTEM: Chronic | ICD-10-CM

## 2025-03-13 DIAGNOSIS — T14.90 INJURY, UNSPECIFIED: Chronic | ICD-10-CM

## 2025-03-13 DIAGNOSIS — Z98.89 OTHER SPECIFIED POSTPROCEDURAL STATES: Chronic | ICD-10-CM

## 2025-03-13 DIAGNOSIS — N21.0 CALCULUS IN BLADDER: Chronic | ICD-10-CM

## 2025-03-13 DIAGNOSIS — M20.60 ACQUIRED DEFORMITIES OF TOE(S), UNSPECIFIED, UNSPECIFIED FOOT: Chronic | ICD-10-CM

## 2025-03-13 DIAGNOSIS — Q06.8 OTHER SPECIFIED CONGENITAL MALFORMATIONS OF SPINAL CORD: Chronic | ICD-10-CM

## 2025-03-13 DIAGNOSIS — N35.9 URETHRAL STRICTURE, UNSPECIFIED: Chronic | ICD-10-CM

## 2025-03-13 DIAGNOSIS — D22.9 MELANOCYTIC NEVI, UNSPECIFIED: Chronic | ICD-10-CM

## 2025-03-13 DIAGNOSIS — N32.2 VESICAL FISTULA, NOT ELSEWHERE CLASSIFIED: Chronic | ICD-10-CM

## 2025-03-13 DIAGNOSIS — Z98.890 OTHER SPECIFIED POSTPROCEDURAL STATES: Chronic | ICD-10-CM

## 2025-03-13 PROCEDURE — 74160 CT ABDOMEN W/CONTRAST: CPT

## 2025-03-13 PROCEDURE — 74160 CT ABDOMEN W/CONTRAST: CPT | Mod: 26

## 2025-03-19 ENCOUNTER — APPOINTMENT (OUTPATIENT)
Dept: PEDIATRIC SURGERY | Facility: CLINIC | Age: 20
End: 2025-03-19
Payer: COMMERCIAL

## 2025-03-19 VITALS — TEMPERATURE: 97.88 F | WEIGHT: 108.91 LBS

## 2025-03-19 DIAGNOSIS — Q45.8 OTHER SPECIFIED CONGENITAL MALFORMATIONS OF DIGESTIVE SYSTEM: ICD-10-CM

## 2025-03-19 DIAGNOSIS — Z93.3 COLOSTOMY STATUS: ICD-10-CM

## 2025-03-19 DIAGNOSIS — K50.00 CROHN'S DISEASE OF SMALL INTESTINE W/OUT COMPLICATIONS: ICD-10-CM

## 2025-03-19 DIAGNOSIS — Q64.12 OTHER SPECIFIED CONGENITAL MALFORMATIONS OF DIGESTIVE SYSTEM: ICD-10-CM

## 2025-03-19 PROCEDURE — 99214 OFFICE O/P EST MOD 30 MIN: CPT

## 2025-04-02 ENCOUNTER — TRANSCRIPTION ENCOUNTER (OUTPATIENT)
Age: 20
End: 2025-04-02

## 2025-04-02 ENCOUNTER — OUTPATIENT (OUTPATIENT)
Dept: OUTPATIENT SERVICES | Age: 20
LOS: 1 days | Discharge: HOME | End: 2025-04-02
Payer: COMMERCIAL

## 2025-04-02 ENCOUNTER — RESULT REVIEW (OUTPATIENT)
Age: 20
End: 2025-04-02

## 2025-04-02 VITALS
TEMPERATURE: 98 F | HEART RATE: 81 BPM | HEIGHT: 57.87 IN | WEIGHT: 110.89 LBS | OXYGEN SATURATION: 100 % | DIASTOLIC BLOOD PRESSURE: 83 MMHG | SYSTOLIC BLOOD PRESSURE: 120 MMHG | RESPIRATION RATE: 18 BRPM

## 2025-04-02 VITALS
DIASTOLIC BLOOD PRESSURE: 65 MMHG | RESPIRATION RATE: 18 BRPM | HEART RATE: 79 BPM | SYSTOLIC BLOOD PRESSURE: 106 MMHG | OXYGEN SATURATION: 100 %

## 2025-04-02 DIAGNOSIS — Z98.89 OTHER SPECIFIED POSTPROCEDURAL STATES: Chronic | ICD-10-CM

## 2025-04-02 DIAGNOSIS — Q06.9 CONGENITAL MALFORMATION OF SPINAL CORD, UNSPECIFIED: Chronic | ICD-10-CM

## 2025-04-02 DIAGNOSIS — Q45.8 OTHER SPECIFIED CONGENITAL MALFORMATIONS OF DIGESTIVE SYSTEM: Chronic | ICD-10-CM

## 2025-04-02 DIAGNOSIS — T14.90 INJURY, UNSPECIFIED: Chronic | ICD-10-CM

## 2025-04-02 DIAGNOSIS — Q42.3 CONGENITAL ABSENCE, ATRESIA AND STENOSIS OF ANUS WITHOUT FISTULA: Chronic | ICD-10-CM

## 2025-04-02 DIAGNOSIS — Q06.8 OTHER SPECIFIED CONGENITAL MALFORMATIONS OF SPINAL CORD: Chronic | ICD-10-CM

## 2025-04-02 DIAGNOSIS — N35.9 URETHRAL STRICTURE, UNSPECIFIED: Chronic | ICD-10-CM

## 2025-04-02 DIAGNOSIS — M20.60 ACQUIRED DEFORMITIES OF TOE(S), UNSPECIFIED, UNSPECIFIED FOOT: Chronic | ICD-10-CM

## 2025-04-02 DIAGNOSIS — K92.2 GASTROINTESTINAL HEMORRHAGE, UNSPECIFIED: ICD-10-CM

## 2025-04-02 DIAGNOSIS — Z98.890 OTHER SPECIFIED POSTPROCEDURAL STATES: Chronic | ICD-10-CM

## 2025-04-02 DIAGNOSIS — M95.5 ACQUIRED DEFORMITY OF PELVIS: Chronic | ICD-10-CM

## 2025-04-02 DIAGNOSIS — N32.2 VESICAL FISTULA, NOT ELSEWHERE CLASSIFIED: Chronic | ICD-10-CM

## 2025-04-02 DIAGNOSIS — Q64.12 CLOACAL EXSTROPHY OF URINARY BLADDER: Chronic | ICD-10-CM

## 2025-04-02 DIAGNOSIS — D22.9 MELANOCYTIC NEVI, UNSPECIFIED: Chronic | ICD-10-CM

## 2025-04-02 DIAGNOSIS — N21.0 CALCULUS IN BLADDER: Chronic | ICD-10-CM

## 2025-04-02 PROCEDURE — 88305 TISSUE EXAM BY PATHOLOGIST: CPT | Mod: 26

## 2025-04-02 PROCEDURE — 45380 COLONOSCOPY AND BIOPSY: CPT

## 2025-04-02 NOTE — ASU PATIENT PROFILE, PEDIATRIC - NS PRO FEEL SAFE YN PEDS
unable to assess Hydroxyzine Counseling: Patient advised that the medication is sedating and not to drive a car after taking this medication.  Patient informed of potential adverse effects including but not limited to dry mouth, urinary retention, and blurry vision.  The patient verbalized understanding of the proper use and possible adverse effects of hydroxyzine.  All of the patient's questions and concerns were addressed.

## 2025-04-02 NOTE — PRE PROCEDURE NOTE - PRE PROCEDURE EVALUATION
Indications: high ostomy output  Allergies: cipro, flagyl, ferric carboxymaltose, fluoroquinolone, latex  Medical and surgical history: cloacal exstrophy, bladder fistula, tethered cord, imperforate anus s/p tethered cord release, bladder reconstruction, and neovagina with Mitrofanoff and colostomy  Current medications: xifaxin, loperamide, iron, lorazepam, vit D, hormone replacement therapy    General: within normal limits  HEENT: within normal limits  Respiratory: within normal limits  CV: within normal limits  Abdomen: within normal limits  MSK: within normal limits  Skin: within normal limits  Neuro: within normal limits

## 2025-04-02 NOTE — ED PEDIATRIC TRIAGE NOTE - CCCP TRG CHIEF CMPLNT
[de-identified] : XR of Date: 3/25/25 Indication: [Right Knee Pain] Views: [4-Weightbearing AP, Lateral, [place], [place]]  Performed at [facility]: [Yes]  Impression: [4] views of the right knee were obtained today that show no fracture, or dislocation.  Moderate joint space narrowing tricompartments with with tricompartmental osteophytes. Osteopenia with vascular calcifications  The radiographs discussed were read by me during this patient's visit.  I reviewed each radiograph detail with the patient discussed the findings highlighted in the Impression.  medical evaluation

## 2025-04-02 NOTE — ASU DISCHARGE PLAN (ADULT/PEDIATRIC) - FINANCIAL ASSISTANCE
[facility] provides services at a reduced cost to those who are determined to be eligible through [facility]’s financial assistance program. Information regarding [facility]’s financial assistance program can be found by going to [email or web address] or by calling [phone].

## 2025-04-02 NOTE — ASU DISCHARGE PLAN (ADULT/PEDIATRIC) - CARE PROVIDER_API CALL
Jenny Rai  Pediatric Gastroenterology  [address], [address]  [place] [address]  Phone: [phone]  Fax: [phone]  Follow Up Time:

## 2025-04-07 LAB — SURGICAL PATHOLOGY STUDY: SIGNIFICANT CHANGE UP

## 2025-04-08 ENCOUNTER — NON-APPOINTMENT (OUTPATIENT)
Age: 20
End: 2025-04-08

## 2025-04-08 ENCOUNTER — RX RENEWAL (OUTPATIENT)
Age: 20
End: 2025-04-08

## 2025-04-08 DIAGNOSIS — K52.9 NONINFECTIVE GASTROENTERITIS AND COLITIS, UNSPECIFIED: ICD-10-CM

## 2025-04-08 RX ORDER — MESALAMINE 1.2 G/1
1.2 TABLET, DELAYED RELEASE ORAL
Qty: 360 | Refills: 0 | Status: ACTIVE | COMMUNITY
Start: 2025-04-08 | End: 1900-01-01

## 2025-04-10 ENCOUNTER — NON-APPOINTMENT (OUTPATIENT)
Age: 20
End: 2025-04-10

## 2025-04-11 ENCOUNTER — APPOINTMENT (OUTPATIENT)
Dept: ORTHOPEDIC SURGERY | Facility: CLINIC | Age: 20
End: 2025-04-11

## 2025-04-11 NOTE — PROGRESS NOTE ADULT - SUBJECTIVE AND OBJECTIVE BOX
"Tyshawn Coles is a 85 y.o. male on day 11 of admission presenting with Abscess of hip.    Subjective   Assumed care of patient today.  RN informed this writer that patient was febrile this morning to 102.3 which is new for patient.  Systolics 180s and heart rate 104.  RN also informed this writer that patient's left lower extremity appeared more internally rotated and shortened than prior examinations.  This writer urgently went to evaluate patient.  He is nonverbal at baseline per staff.  He is sitting in bed, alert, slightly diaphoretic.  Lab at bedside drawing ordered blood cultures and lactate.  Patient is able to slightly nod yes and no to questioning.  He denied pain, denied left leg pain, chest pain, abdominal pain.  When asked if he was \"doing okay\" he nodded yes.  Extensive discussion with RN at bedside regarding patient's baseline exam findings.  RN reports at baseline patient is nonverbal, nods yes/no, does follow some commands but that he overall appears slightly worsened than prior evaluations.    This writer ordered blood cultures, lactate and x-ray of left hip.  X-ray tech at bedside and this writer was able to view the x-ray in real-time and appears that hip prosthesis is dislocated.  Formal read is pending.  Advised RN to contact Ortho team regarding findings.    This writer contacted ID and renal team to discuss the case as patient is persistently noted to have worsening leukocytosis despite Zosyn and now is febrile today.  This writer ordered dose of vancomycin    Discussed with ID/renal whether his tunneled HD line should be cultured.    This writer contacted patient's wife this morning and informed her of the above clinical events.  Wife expresses concern that should patient require surgical management that he may not be a good candidate.  This writer is in agreement but advised wife that we first need to learn what the Ortho recommendations may be and then decide how to proceed from there.  Wife " "is in agreement.    Pt made NPO. Apixaban held.        Objective     Physical Exam    Constitutional: sitting in bed, alert/interactive, nonverbal, nods yes/no with slight movements, faintly diaphoretic, chronically ill appearing  HEENT: anicteric sclera, eomi  Cardiovascular: Regular, rate and rhythm, no murmurs, 2+ equal pulses of the extremities, normal S1 and S2  Respiratory/Thorax: Patent airways, CTAB, normal breath sounds with good chest expansion, thorax symmetric, RA, nonlabored  Gastrointestinal: +BS, Nondistended, soft, non-tender, no guarding nor rebound  Musculoskeletal: LLE +internally rotated, shortened, no hip pain on palpation, no ecchymosis noted,   Extremities: generalized anasarca, 1+ LE edema b/l  Skin: warm and dry. No rashes nor lesions noted  Neurological: alert and oriented to self only, nonverbal, mild intermittent RUE hand tremor noted, follows commands to squeeze hand on R. , chronically unable to move LUE per RN, severely limited LLE ROM due to above issue, sensation intact, no obvious facial droop noted     Last Recorded Vitals  Blood pressure 117/53, pulse 103, temperature (!) 38 °C (100.4 °F), resp. rate 20, height 1.828 m (5' 11.97\"), weight 67.6 kg (149 lb), SpO2 98%.  Intake/Output last 3 Shifts:  I/O last 3 completed shifts:  In: 1480 (21.9 mL/kg) [P.O.:480; I.V.:600 (8.9 mL/kg); Other:400]  Out: 2400 (35.5 mL/kg) [Other:2400]  Weight: 67.6 kg     Relevant Results  Scheduled medications  apixaban, 2.5 mg, oral, BID  cholecalciferol, 1,000 Units, oral, Daily  docusate sodium, 100 mg, oral, Daily  donepezil, 22.5 mg, oral, Daily  epoetin sheila or biosimilar, 10,000 Units, subcutaneous, Once per day on Monday Wednesday Friday  finasteride, 5 mg, oral, Daily  gabapentin, 100 mg, oral, Once per day on Monday Wednesday Friday  heparin, 2,000 Units, intra-catheter, After Dialysis  heparin, 2,000 Units, intra-catheter, After Dialysis  insulin glargine, 8 Units, subcutaneous, " Anesthesia Pain Management Service    SUBJECTIVE: Patient is doing well with IV PCA and no significant problems reported.    Pain Scale Score	At rest: _5/10__ 	With Activity: ___ 	[X ] Refer to charted pain scores    THERAPY:    [ ] IV PCA Morphine		[ ] 5 mg/mL	[ ] 1 mg/mL  [X ] IV PCA Hydromorphone	[ ] 5 mg/mL	[X ] 1 mg/mL  [ ] IV PCA Fentanyl		[ ] 50 micrograms/mL    Demand dose __0.2_ lockout __6_ (minutes) Continuous Rate _0__ Total: _5__  mg used (in past 24 hours)      MEDICATIONS  (STANDING):  acetaminophen     Tablet .. 975 milliGRAM(s) Oral every 6 hours  cholecalciferol 1000 Unit(s) Oral daily  cyclobenzaprine 5 milliGRAM(s) Oral every 6 hours  enoxaparin Injectable 30 milliGRAM(s) SubCutaneous every 24 hours  ferrous    sulfate 325 milliGRAM(s) Oral two times a day  HYDROmorphone PCA (1 mG/mL) 30 milliLiter(s) PCA Continuous PCA Continuous  loperamide 12 milliGRAM(s) Oral four times a day  loratadine 10 milliGRAM(s) Oral daily  oxybutynin 10 milliGRAM(s) Oral two times a day  sodium chloride 0.9%. 1000 milliLiter(s) (30 mL/Hr) IV Continuous <Continuous>    MEDICATIONS  (PRN):  HYDROmorphone PCA (1 mG/mL) Rescue Clinician Bolus 0.3 milliGRAM(s) IV Push every 15 minutes PRN for Pain Scale GREATER THAN 6  naloxone Injectable 0.1 milliGRAM(s) IV Push every 3 minutes PRN For ANY of the following changes in patient status:  A. RR LESS THAN 10 breaths per minute, B. Oxygen saturation LESS THAN 90%, C. Sedation score of 6  ondansetron   Disintegrating Tablet 4 milliGRAM(s) Oral once PRN Nausea and/or Vomiting      OBJECTIVE: Patient laying in bed with oxygen mask on.    Sedation Score:	[ X] Alert	[ ] Drowsy 	[ ] Arousable	[ ] Asleep	[ ] Unresponsive    Side Effects:	[X ] None	[ ] Nausea	[ ] Vomiting	[ ] Pruritus  		[ ] Other:    Vital Signs Last 24 Hrs  T(C): 37.6 (13 Nov 2022 08:00), Max: 39.1 (13 Nov 2022 04:00)  T(F): 99.6 (13 Nov 2022 08:00), Max: 102.3 (13 Nov 2022 04:00)  HR: 115 (13 Nov 2022 09:00) (112 - 153)  BP: 103/62 (13 Nov 2022 08:00) (97/63 - 108/68)  BP(mean): 75 (13 Nov 2022 08:00) (74 - 80)  RR: 13 (13 Nov 2022 09:00) (12 - 26)  SpO2: 100% (13 Nov 2022 09:00) (83% - 100%)    Parameters below as of 13 Nov 2022 09:00  Patient On (Oxygen Delivery Method): mask, aerosol  O2 Flow (L/min): 6      ASSESSMENT/ PLAN    Therapy to  be:	[ X] Continue   [ ] Discontinued   [ ] Change to prn Analgesics    Documentation and Verification of current medications:   [X] Done	[ ] Not done, not elligible    Comments: Continue IV PCA. Recommend non-opioid adjuvant analgesics to be used when possible and when allowed by primary surgical team.    Progress Note written now but Patient was seen earlier. Nightly  insulin lispro, 0-10 Units, subcutaneous, TID AC  levETIRAcetam, 500 mg, oral, BID  metoprolol tartrate, 50 mg, oral, BID  piperacillin-tazobactam, 3.375 g, intravenous, q12h  sevelamer carbonate, 1,600 mg, oral, BID  vancomycin, 1,750 mg, intravenous, Once  vitamin B complex-vitamin C-folic acid, 1 capsule, oral, Daily      Continuous medications     PRN medications  PRN medications: acetaminophen **OR** acetaminophen, alteplase, benzocaine-menthol, guaiFENesin, ipratropium-albuteroL, melatonin, ondansetron **OR** ondansetron, polyethylene glycol, prochlorperazine **OR** prochlorperazine **OR** prochlorperazine, vancomycin  Results from last 7 days   Lab Units 04/11/25  0514 04/10/25  0837 04/09/25  0708   WBC AUTO x10*3/uL 30.5* 28.1* 38.7*  38.7*   RBC AUTO x10*6/uL 2.93* 2.81* 2.72*  2.72*   HEMOGLOBIN g/dL 8.5* 8.0* 7.8*  7.8*     Results from last 7 days   Lab Units 04/11/25  0514 04/10/25  0837 04/09/25  0708 04/07/25  1504 04/07/25  1026 04/06/25  0538   SODIUM mmol/L 131* 130* 133*   < > 125* 131*   POTASSIUM mmol/L 4.9 5.1 4.8   < > 6.0* 4.5   CHLORIDE mmol/L 95* 93* 95*   < > 90* 93*   CO2 mmol/L 26 27 30   < > 23 29   BUN mg/dL 34* 54* 39*   < > 48* 32*   CREATININE mg/dL 3.56* 5.29* 4.11*   < > 4.97* 3.55*   CALCIUM mg/dL 8.0* 7.9* 8.0*   < > 7.9* 8.1*   PHOSPHORUS mg/dL  --   --   --   --   --  3.7   BILIRUBIN TOTAL mg/dL  --   --  0.4  --  0.5  --    ALT U/L  --   --  12  --  9*  --    AST U/L  --   --  16  --  21  --     < > = values in this interval not displayed.       Imaging  No results found.    Cardiology, Vascular, and Other Imaging  No other imaging results found for the past 2 days       Assessment/Plan   Assessment & Plan        A/P:  Pt is an 85 y.o. male with past medical history of ESRD on hemodialysis via tunneled HD line, hypertension, hyperlipidemia, type 2 diabetes mellitus, dementia with chronic encephalopathy presented from outside hospital on 3/31 for  posteriorly  "dislocated hip. Ortho consulted and rec closed reduction of L. Hip. Hospital course complicated by persistent leukocytosis despite abx.     Today with c/f new fever, worsened leukocytosis and recurrent L. Hip dislocation. Updated xray obtained this AM. Added vanc to abx regimen. CT hip ordered stat by ortho team. All consulting team members and wife updated. Will carefully monitor closely         Recent left hip replacement now with posterior superior dislocation of the left hip hemiarthroplasty with surrounding fluid collection  S/p L hip hemiarthroplasty 3/10/25  Orthopedic surgery reduced hip under general anesthesia on 3/31  CT imaging with soft tissue air-fluid collection around L hip, ortho feels it is likely post operative changes and hematoma, felt no indication for aspiration  Xray results concerning this AM, see above.  -CT hip stat ordered, awaiting result  -wife updated  -will await further ortho input  -apixaban held this AM. Pt made NPO for now pending ortho plan  -note pt is a poor surgical candidate given multiple comorbidities   Weightbearing as tolerated for transfers related purposes only  Palliative care followed.   Per prior attending: \"Discussed with wife 4/9 that palliative/hospice approach is reasonable. She elected against hospice 4/10.\"        Bacteroides caccae bacteremia prior to admission  Leukocytosis  Blood cultures 3/29 from dialysis positive for Bacteroides caccae   CT AP on admit with chronic sacral osteomyelitis, ortho felt fluid collection around hip was non infectious  Blood culture from [facility] 3/30 negative  Repeat blood cultures 3/31 negative to date  Urine culture 3/31 negative  WBC scan 4/7/25 negative for acute infectious process  Continue Zosyn  C diff PCR negative, PO vanc stopped 4/10  -leukocytosis worsening again today to 30.5  -now febrile this AM to 102.3  -repeat blood cultures obtained  -lactate obtained, wnl (1.6)  -add IV vanc to regimen  -continue " zosyn  -ID/renal updated. Discussed whether to to culture HD line as this is a possible source of infection. In addition pt has chronic sacral wound, sacral OM  -will also await updated CT today as pt had prior fluid collection around hip that previously ortho did not feel warranted aspiration but is also a possible infectious source      Encephalopathy   Currently oriented x0-1, close to baseline  TSH and NH3 wnl  Goals of care as above     Decubitus ulcer  Continue with localized wound care  Continue with supportive care and frequent turning     Anemia   -In setting of ESRD, baseline Hbg ~7-9 range  -Received IV Iron, Epogen per nephrology      Atrial Fibrillation  -Continue Eliquis     ESRD on hemodialysis Tuesday Thursday Saturday  Nephrology following  Continue with Renvela, Nephrocaps     Hypertension  Continue metoprolol     Dementia  Continue with the Aricept  Continue with Keppra     Type 2 diabetes mellitus  A1c 6.9 on 4/4/25  Continue Lantus and SSI     BPH  Continue with finasteride  Urine culture negative 3/31        DVT ppx:  Eliquis  Code status: DNR/DNI  Dispo: Back to long term care               Krystin Epps MD

## 2025-04-24 ENCOUNTER — TRANSCRIPTION ENCOUNTER (OUTPATIENT)
Age: 20
End: 2025-04-24

## 2025-04-24 DIAGNOSIS — R11.0 NAUSEA: ICD-10-CM

## 2025-04-25 ENCOUNTER — TRANSCRIPTION ENCOUNTER (OUTPATIENT)
Age: 20
End: 2025-04-25

## 2025-04-25 ENCOUNTER — APPOINTMENT (OUTPATIENT)
Dept: CT IMAGING | Facility: CLINIC | Age: 20
End: 2025-04-25
Payer: COMMERCIAL

## 2025-04-25 ENCOUNTER — OUTPATIENT (OUTPATIENT)
Dept: OUTPATIENT SERVICES | Facility: HOSPITAL | Age: 20
LOS: 1 days | End: 2025-04-25
Payer: COMMERCIAL

## 2025-04-25 DIAGNOSIS — M95.5 ACQUIRED DEFORMITY OF PELVIS: Chronic | ICD-10-CM

## 2025-04-25 DIAGNOSIS — Q45.8 OTHER SPECIFIED CONGENITAL MALFORMATIONS OF DIGESTIVE SYSTEM: Chronic | ICD-10-CM

## 2025-04-25 DIAGNOSIS — Q64.12 CLOACAL EXSTROPHY OF URINARY BLADDER: Chronic | ICD-10-CM

## 2025-04-25 DIAGNOSIS — Q42.3 CONGENITAL ABSENCE, ATRESIA AND STENOSIS OF ANUS WITHOUT FISTULA: Chronic | ICD-10-CM

## 2025-04-25 DIAGNOSIS — Z00.8 ENCOUNTER FOR OTHER GENERAL EXAMINATION: ICD-10-CM

## 2025-04-25 DIAGNOSIS — T14.90 INJURY, UNSPECIFIED: Chronic | ICD-10-CM

## 2025-04-25 DIAGNOSIS — M20.60 ACQUIRED DEFORMITIES OF TOE(S), UNSPECIFIED, UNSPECIFIED FOOT: Chronic | ICD-10-CM

## 2025-04-25 DIAGNOSIS — D22.9 MELANOCYTIC NEVI, UNSPECIFIED: Chronic | ICD-10-CM

## 2025-04-25 DIAGNOSIS — Z98.89 OTHER SPECIFIED POSTPROCEDURAL STATES: Chronic | ICD-10-CM

## 2025-04-25 DIAGNOSIS — N35.9 URETHRAL STRICTURE, UNSPECIFIED: Chronic | ICD-10-CM

## 2025-04-25 DIAGNOSIS — Q06.8 OTHER SPECIFIED CONGENITAL MALFORMATIONS OF SPINAL CORD: Chronic | ICD-10-CM

## 2025-04-25 DIAGNOSIS — Q06.9 CONGENITAL MALFORMATION OF SPINAL CORD, UNSPECIFIED: Chronic | ICD-10-CM

## 2025-04-25 DIAGNOSIS — Z98.890 OTHER SPECIFIED POSTPROCEDURAL STATES: Chronic | ICD-10-CM

## 2025-04-25 DIAGNOSIS — Z98.890 OTHER SPECIFIED POSTPROCEDURAL STATES: ICD-10-CM

## 2025-04-25 DIAGNOSIS — N32.2 VESICAL FISTULA, NOT ELSEWHERE CLASSIFIED: Chronic | ICD-10-CM

## 2025-04-25 PROCEDURE — 72131 CT LUMBAR SPINE W/O DYE: CPT

## 2025-04-25 PROCEDURE — 72131 CT LUMBAR SPINE W/O DYE: CPT | Mod: 26

## 2025-04-25 RX ORDER — ONDANSETRON 8 MG/1
8 TABLET, ORALLY DISINTEGRATING ORAL
Qty: 270 | Refills: 3 | Status: ACTIVE | COMMUNITY
Start: 2025-04-24 | End: 1900-01-01

## 2025-04-26 ENCOUNTER — APPOINTMENT (OUTPATIENT)
Dept: PEDIATRICS | Facility: CLINIC | Age: 20
End: 2025-04-26
Payer: COMMERCIAL

## 2025-04-26 VITALS
OXYGEN SATURATION: 99 % | WEIGHT: 110.2 LBS | SYSTOLIC BLOOD PRESSURE: 96 MMHG | HEART RATE: 97 BPM | HEIGHT: 57.75 IN | BODY MASS INDEX: 23.13 KG/M2 | DIASTOLIC BLOOD PRESSURE: 60 MMHG

## 2025-04-26 DIAGNOSIS — Z00.00 ENCOUNTER FOR GENERAL ADULT MEDICAL EXAMINATION W/OUT ABNORMAL FINDINGS: ICD-10-CM

## 2025-04-26 PROCEDURE — 96160 PT-FOCUSED HLTH RISK ASSMT: CPT | Mod: 59

## 2025-04-26 PROCEDURE — 96127 BRIEF EMOTIONAL/BEHAV ASSMT: CPT

## 2025-04-26 PROCEDURE — 92551 PURE TONE HEARING TEST AIR: CPT

## 2025-04-26 PROCEDURE — 99395 PREV VISIT EST AGE 18-39: CPT | Mod: 25

## 2025-04-26 RX ORDER — CELECOXIB 100 MG/1
100 CAPSULE ORAL
Refills: 0 | Status: ACTIVE | COMMUNITY
Start: 2025-04-26

## 2025-04-26 RX ORDER — MEDROXYPROGESTERONE ACETATE 150 MG/ML
150 INJECTION, SUSPENSION INTRAMUSCULAR
Qty: 0 | Refills: 0 | Status: COMPLETED | OUTPATIENT
Start: 2025-04-26

## 2025-04-26 RX ORDER — MESALAMINE 250 MG/1
250 CAPSULE ORAL
Refills: 0 | Status: ACTIVE | COMMUNITY
Start: 2025-04-26

## 2025-04-26 RX ADMIN — MEDROXYPROGESTERONE ACETATE 1 MG/ML: 150 INJECTION, SUSPENSION INTRAMUSCULAR at 00:00

## 2025-04-28 ENCOUNTER — APPOINTMENT (OUTPATIENT)
Dept: GASTROENTEROLOGY | Facility: CLINIC | Age: 20
End: 2025-04-28

## 2025-04-28 ENCOUNTER — NON-APPOINTMENT (OUTPATIENT)
Age: 20
End: 2025-04-28

## 2025-04-28 VITALS
TEMPERATURE: 207.14 F | HEIGHT: 57 IN | HEART RATE: 75 BPM | OXYGEN SATURATION: 100 % | BODY MASS INDEX: 23.73 KG/M2 | WEIGHT: 110 LBS | SYSTOLIC BLOOD PRESSURE: 106 MMHG | DIASTOLIC BLOOD PRESSURE: 78 MMHG

## 2025-04-28 PROCEDURE — 99205 OFFICE O/P NEW HI 60 MIN: CPT

## 2025-04-28 RX ORDER — PSYLLIUM SEED
55.6 PACKET (EA) ORAL
Qty: 1 | Refills: 3 | Status: ACTIVE | COMMUNITY
Start: 2025-04-28 | End: 1900-01-01

## 2025-04-28 RX ORDER — FEXOFENADINE HCL 60 MG
TABLET ORAL
Refills: 0 | Status: ACTIVE | COMMUNITY

## 2025-04-28 RX ORDER — DIAZEPAM 5 MG/1
5 TABLET ORAL
Refills: 0 | Status: ACTIVE | COMMUNITY

## 2025-04-28 RX ORDER — CYCLOBENZAPRINE HCL 5 MG
TABLET ORAL
Refills: 0 | Status: ACTIVE | COMMUNITY

## 2025-05-13 RX ORDER — ONDANSETRON 8 MG/1
8 TABLET ORAL
Qty: 270 | Refills: 2 | Status: ACTIVE | COMMUNITY
Start: 2025-05-13 | End: 1900-01-01

## 2025-05-14 ENCOUNTER — TRANSCRIPTION ENCOUNTER (OUTPATIENT)
Age: 20
End: 2025-05-14

## 2025-05-28 ENCOUNTER — APPOINTMENT (OUTPATIENT)
Dept: PEDIATRIC SURGERY | Facility: CLINIC | Age: 20
End: 2025-05-28

## 2025-05-28 PROCEDURE — 99214 OFFICE O/P EST MOD 30 MIN: CPT | Mod: 95

## 2025-06-01 ENCOUNTER — OUTPATIENT (OUTPATIENT)
Dept: OUTPATIENT SERVICES | Age: 20
LOS: 1 days | Discharge: HOME | End: 2025-06-01

## 2025-06-01 DIAGNOSIS — Q45.8 OTHER SPECIFIED CONGENITAL MALFORMATIONS OF DIGESTIVE SYSTEM: Chronic | ICD-10-CM

## 2025-06-01 DIAGNOSIS — Q64.12 CLOACAL EXSTROPHY OF URINARY BLADDER: Chronic | ICD-10-CM

## 2025-06-01 DIAGNOSIS — N21.0 CALCULUS IN BLADDER: Chronic | ICD-10-CM

## 2025-06-01 DIAGNOSIS — N35.9 URETHRAL STRICTURE, UNSPECIFIED: Chronic | ICD-10-CM

## 2025-06-01 DIAGNOSIS — Z98.89 OTHER SPECIFIED POSTPROCEDURAL STATES: Chronic | ICD-10-CM

## 2025-06-01 DIAGNOSIS — D22.9 MELANOCYTIC NEVI, UNSPECIFIED: Chronic | ICD-10-CM

## 2025-06-01 DIAGNOSIS — M95.5 ACQUIRED DEFORMITY OF PELVIS: Chronic | ICD-10-CM

## 2025-06-01 DIAGNOSIS — Q06.9 CONGENITAL MALFORMATION OF SPINAL CORD, UNSPECIFIED: Chronic | ICD-10-CM

## 2025-06-01 DIAGNOSIS — T14.90 INJURY, UNSPECIFIED: Chronic | ICD-10-CM

## 2025-06-01 DIAGNOSIS — Q42.3 CONGENITAL ABSENCE, ATRESIA AND STENOSIS OF ANUS WITHOUT FISTULA: Chronic | ICD-10-CM

## 2025-06-01 DIAGNOSIS — Z98.890 OTHER SPECIFIED POSTPROCEDURAL STATES: Chronic | ICD-10-CM

## 2025-06-01 DIAGNOSIS — N32.2 VESICAL FISTULA, NOT ELSEWHERE CLASSIFIED: Chronic | ICD-10-CM

## 2025-06-01 DIAGNOSIS — M20.60 ACQUIRED DEFORMITIES OF TOE(S), UNSPECIFIED, UNSPECIFIED FOOT: Chronic | ICD-10-CM

## 2025-06-01 DIAGNOSIS — Q06.8 OTHER SPECIFIED CONGENITAL MALFORMATIONS OF SPINAL CORD: Chronic | ICD-10-CM

## 2025-06-09 ENCOUNTER — NON-APPOINTMENT (OUTPATIENT)
Age: 20
End: 2025-06-09
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
## 2025-06-16 NOTE — ADDENDUM
Called pt because she was scheduled for follow up with Dr Steen for her appt.  Originally seen by Dr Santiago.  Pt is having an MRI today-I did reschedule her for this Friday with Dr Santiago at 3p.  Dr Santiago checking with you in regards to this-as I double booked her and also normally book 1 week after MRI.  Please advise if she should see you next week or if you are ok with seeing her this Friday.     [FreeTextEntry1] : Documented by Elder Hermosillo acting as a scribe for Dr. Bolton on 12/15/2021.\par \par All medical record entries made by the Scribe were at my, Dr. Bolton , direction and personally dictated by me on 12/15/2021. I have reviewed the chart and agree that the record accurately reflects my personal performances of the history, physical exam, assessment and plan. I have also personally directed, reviewed, and agree with the discharge instructions.

## 2025-06-19 ENCOUNTER — APPOINTMENT (OUTPATIENT)
Dept: PEDIATRIC SURGERY | Facility: CLINIC | Age: 20
End: 2025-06-19
Payer: COMMERCIAL

## 2025-06-19 VITALS — TEMPERATURE: 97.7 F | WEIGHT: 106.04 LBS

## 2025-06-19 PROCEDURE — 99203 OFFICE O/P NEW LOW 30 MIN: CPT

## 2025-06-20 ENCOUNTER — APPOINTMENT (OUTPATIENT)
Dept: CT IMAGING | Facility: CLINIC | Age: 20
End: 2025-06-20

## 2025-06-20 ENCOUNTER — INPATIENT (INPATIENT)
Age: 20
LOS: 1 days | Discharge: HOME | End: 2025-06-22
Attending: STUDENT IN AN ORGANIZED HEALTH CARE EDUCATION/TRAINING PROGRAM | Admitting: STUDENT IN AN ORGANIZED HEALTH CARE EDUCATION/TRAINING PROGRAM
Payer: COMMERCIAL

## 2025-06-20 VITALS
DIASTOLIC BLOOD PRESSURE: 70 MMHG | TEMPERATURE: 98 F | OXYGEN SATURATION: 100 % | WEIGHT: 106.92 LBS | HEART RATE: 100 BPM | RESPIRATION RATE: 20 BRPM | SYSTOLIC BLOOD PRESSURE: 105 MMHG

## 2025-06-20 DIAGNOSIS — N35.9 URETHRAL STRICTURE, UNSPECIFIED: Chronic | ICD-10-CM

## 2025-06-20 DIAGNOSIS — Q45.8 OTHER SPECIFIED CONGENITAL MALFORMATIONS OF DIGESTIVE SYSTEM: Chronic | ICD-10-CM

## 2025-06-20 DIAGNOSIS — Q06.9 CONGENITAL MALFORMATION OF SPINAL CORD, UNSPECIFIED: Chronic | ICD-10-CM

## 2025-06-20 DIAGNOSIS — Q06.8 OTHER SPECIFIED CONGENITAL MALFORMATIONS OF SPINAL CORD: Chronic | ICD-10-CM

## 2025-06-20 DIAGNOSIS — M20.60 ACQUIRED DEFORMITIES OF TOE(S), UNSPECIFIED, UNSPECIFIED FOOT: Chronic | ICD-10-CM

## 2025-06-20 DIAGNOSIS — Q42.3 CONGENITAL ABSENCE, ATRESIA AND STENOSIS OF ANUS WITHOUT FISTULA: Chronic | ICD-10-CM

## 2025-06-20 DIAGNOSIS — Q64.12 CLOACAL EXSTROPHY OF URINARY BLADDER: Chronic | ICD-10-CM

## 2025-06-20 DIAGNOSIS — Z98.890 OTHER SPECIFIED POSTPROCEDURAL STATES: Chronic | ICD-10-CM

## 2025-06-20 DIAGNOSIS — N32.2 VESICAL FISTULA, NOT ELSEWHERE CLASSIFIED: Chronic | ICD-10-CM

## 2025-06-20 DIAGNOSIS — E86.0 DEHYDRATION: ICD-10-CM

## 2025-06-20 DIAGNOSIS — T14.90 INJURY, UNSPECIFIED: Chronic | ICD-10-CM

## 2025-06-20 DIAGNOSIS — M95.5 ACQUIRED DEFORMITY OF PELVIS: Chronic | ICD-10-CM

## 2025-06-20 DIAGNOSIS — Z98.89 OTHER SPECIFIED POSTPROCEDURAL STATES: Chronic | ICD-10-CM

## 2025-06-20 DIAGNOSIS — N21.0 CALCULUS IN BLADDER: Chronic | ICD-10-CM

## 2025-06-20 DIAGNOSIS — D22.9 MELANOCYTIC NEVI, UNSPECIFIED: Chronic | ICD-10-CM

## 2025-06-20 DIAGNOSIS — Z98.1 ARTHRODESIS STATUS: ICD-10-CM

## 2025-06-20 LAB
ADD ON TEST-SPECIMEN IN LAB: SIGNIFICANT CHANGE UP
ADV 40+41 DNA STL QL NAA+NON-PROBE: SIGNIFICANT CHANGE UP
ALBUMIN SERPL ELPH-MCNC: 5.5 G/DL — HIGH (ref 3.3–5)
ALP SERPL-CCNC: 125 U/L — HIGH (ref 40–120)
ALT FLD-CCNC: 19 U/L — SIGNIFICANT CHANGE UP (ref 4–33)
ANION GAP SERPL CALC-SCNC: 22 MMOL/L — HIGH (ref 7–14)
APPEARANCE UR: ABNORMAL
APTT BLD: 29.8 SEC — SIGNIFICANT CHANGE UP (ref 26.1–36.8)
AST SERPL-CCNC: 21 U/L — SIGNIFICANT CHANGE UP (ref 4–32)
ASTROVIRUS: SIGNIFICANT CHANGE UP
BACTERIA # UR AUTO: ABNORMAL /HPF
BASOPHILS # BLD AUTO: 0.1 K/UL — SIGNIFICANT CHANGE UP (ref 0–0.2)
BASOPHILS NFR BLD AUTO: 1 % — SIGNIFICANT CHANGE UP (ref 0–2)
BILIRUB SERPL-MCNC: 0.9 MG/DL — SIGNIFICANT CHANGE UP (ref 0.2–1.2)
BILIRUB UR-MCNC: NEGATIVE — SIGNIFICANT CHANGE UP
BUN SERPL-MCNC: 34 MG/DL — HIGH (ref 7–23)
C CAYETANENSIS DNA STL QL NAA+NON-PROBE: SIGNIFICANT CHANGE UP
CALCIUM SERPL-MCNC: 10.4 MG/DL — SIGNIFICANT CHANGE UP (ref 8.4–10.5)
CAMPYLOBACTER DNA SPEC NAA+PROBE: SIGNIFICANT CHANGE UP
CAST: 8 /LPF — HIGH (ref 0–4)
CHLORIDE SERPL-SCNC: 100 MMOL/L — SIGNIFICANT CHANGE UP (ref 98–107)
CO2 SERPL-SCNC: 10 MMOL/L — CRITICAL LOW (ref 22–31)
COLOR SPEC: YELLOW — SIGNIFICANT CHANGE UP
CREAT SERPL-MCNC: 0.88 MG/DL — SIGNIFICANT CHANGE UP (ref 0.5–1.3)
CRP SERPL-MCNC: <3 MG/L — SIGNIFICANT CHANGE UP
CRYPTOSP DNA STL QL NAA+PROBE: SIGNIFICANT CHANGE UP
DIFF PNL FLD: ABNORMAL
E COLI SXT SPEC: SIGNIFICANT CHANGE UP
E HISTOLYT DNA STL QL NAA+NON-PROBE: SIGNIFICANT CHANGE UP
EC EAEA GENE STL QL NAA+PROBE: SIGNIFICANT CHANGE UP
EGFR: 96 ML/MIN/1.73M2 — SIGNIFICANT CHANGE UP
EGFR: 96 ML/MIN/1.73M2 — SIGNIFICANT CHANGE UP
EOSINOPHIL # BLD AUTO: 0.02 K/UL — SIGNIFICANT CHANGE UP (ref 0–0.5)
EOSINOPHIL NFR BLD AUTO: 0.2 % — SIGNIFICANT CHANGE UP (ref 0–6)
EPEC DNA STL QL NAA+PROBE: SIGNIFICANT CHANGE UP
ETEC DNA STL QL NAA+PROBE: SIGNIFICANT CHANGE UP
G LAMBLIA DNA STL QL NAA+NON-PROBE: SIGNIFICANT CHANGE UP
GI PCR PANEL: ABNORMAL
GLUCOSE SERPL-MCNC: 95 MG/DL — SIGNIFICANT CHANGE UP (ref 70–99)
GLUCOSE UR QL: NEGATIVE MG/DL — SIGNIFICANT CHANGE UP
HCG SERPL-ACNC: <1 MIU/ML — SIGNIFICANT CHANGE UP
HCT VFR BLD CALC: 43.1 % — SIGNIFICANT CHANGE UP (ref 34.5–45)
HGB BLD-MCNC: 14.7 G/DL — SIGNIFICANT CHANGE UP (ref 11.5–15.5)
IGA FLD-MCNC: 274 MG/DL — SIGNIFICANT CHANGE UP (ref 70–400)
IMM GRANULOCYTES # BLD AUTO: 0.07 K/UL — SIGNIFICANT CHANGE UP (ref 0–0.07)
IMM GRANULOCYTES NFR BLD AUTO: 0.7 % — SIGNIFICANT CHANGE UP (ref 0–0.9)
INR BLD: 1.07 RATIO — SIGNIFICANT CHANGE UP (ref 0.85–1.16)
KETONES UR QL: NEGATIVE MG/DL — SIGNIFICANT CHANGE UP
LEUKOCYTE ESTERASE UR-ACNC: ABNORMAL
LIDOCAIN IGE QN: 47 U/L — SIGNIFICANT CHANGE UP (ref 7–60)
LYMPHOCYTES # BLD AUTO: 2.05 K/UL — SIGNIFICANT CHANGE UP (ref 1–3.3)
LYMPHOCYTES NFR BLD AUTO: 20 % — SIGNIFICANT CHANGE UP (ref 13–44)
MCHC RBC-ENTMCNC: 31.6 PG — SIGNIFICANT CHANGE UP (ref 27–34)
MCHC RBC-ENTMCNC: 34.1 G/DL — SIGNIFICANT CHANGE UP (ref 32–36)
MCV RBC AUTO: 92.7 FL — SIGNIFICANT CHANGE UP (ref 80–100)
MONOCYTES # BLD AUTO: 0.72 K/UL — SIGNIFICANT CHANGE UP (ref 0–0.9)
MONOCYTES NFR BLD AUTO: 7 % — SIGNIFICANT CHANGE UP (ref 2–14)
NEUTROPHILS # BLD AUTO: 7.3 K/UL — SIGNIFICANT CHANGE UP (ref 1.8–7.4)
NEUTROPHILS NFR BLD AUTO: 71.1 % — SIGNIFICANT CHANGE UP (ref 43–77)
NITRITE UR-MCNC: NEGATIVE — SIGNIFICANT CHANGE UP
NOROVIRUS GI+II RNA STL QL NAA+NON-PROBE: ABNORMAL
NRBC # BLD AUTO: 0 K/UL — SIGNIFICANT CHANGE UP (ref 0–0)
NRBC # FLD: 0 K/UL — SIGNIFICANT CHANGE UP (ref 0–0)
NRBC BLD AUTO-RTO: 0 /100 WBCS — SIGNIFICANT CHANGE UP (ref 0–0)
P SHIGELLOIDES DNA STL QL NAA+NON-PROBE: SIGNIFICANT CHANGE UP
PH UR: 7 — SIGNIFICANT CHANGE UP (ref 5–8)
PLATELET # BLD AUTO: 474 K/UL — HIGH (ref 150–400)
PMV BLD: 9.5 FL — SIGNIFICANT CHANGE UP (ref 7–13)
POTASSIUM SERPL-MCNC: 3.9 MMOL/L — SIGNIFICANT CHANGE UP (ref 3.5–5.3)
POTASSIUM SERPL-SCNC: 3.9 MMOL/L — SIGNIFICANT CHANGE UP (ref 3.5–5.3)
PROT SERPL-MCNC: 9.3 G/DL — HIGH (ref 6–8.3)
PROT UR-MCNC: 300 MG/DL
PROTHROM AB SERPL-ACNC: 12.7 SEC — SIGNIFICANT CHANGE UP (ref 9.9–13.4)
RBC # BLD: 4.65 M/UL — SIGNIFICANT CHANGE UP (ref 3.8–5.2)
RBC # FLD: 11.9 % — SIGNIFICANT CHANGE UP (ref 10.3–14.5)
RBC CASTS # UR COMP ASSIST: 1 /HPF — SIGNIFICANT CHANGE UP (ref 0–4)
REVIEW: SIGNIFICANT CHANGE UP
RV RNA STL NAA+PROBE: SIGNIFICANT CHANGE UP
SALMONELLA DNA STL QL NAA+PROBE: SIGNIFICANT CHANGE UP
SAPOVIRUS (GENOGROUPS I, II, IV, AND V): SIGNIFICANT CHANGE UP
SHIGELLA DNA SPEC QL NAA+PROBE: SIGNIFICANT CHANGE UP
SODIUM SERPL-SCNC: 132 MMOL/L — LOW (ref 135–145)
SP GR SPEC: 1.02 — SIGNIFICANT CHANGE UP (ref 1–1.03)
SQUAMOUS # UR AUTO: 1 /HPF — SIGNIFICANT CHANGE UP (ref 0–5)
UROBILINOGEN FLD QL: 0.2 MG/DL — SIGNIFICANT CHANGE UP (ref 0.2–1)
VIBRIO CHOL TOXIN CTXA STL QL NAA+PROBE: SIGNIFICANT CHANGE UP
VIBRIO CHOL TOXIN CTXA STL QL NAA+PROBE: SIGNIFICANT CHANGE UP
WBC # BLD: 10.26 K/UL — SIGNIFICANT CHANGE UP (ref 3.8–10.5)
WBC # FLD AUTO: 10.26 K/UL — SIGNIFICANT CHANGE UP (ref 3.8–10.5)
WBC UR QL: 26 /HPF — HIGH (ref 0–5)
Y ENTEROCOL DNA STL QL NAA+NON-PROBE: SIGNIFICANT CHANGE UP

## 2025-06-20 PROCEDURE — 72196 MRI PELVIS W/DYE: CPT | Mod: 26

## 2025-06-20 PROCEDURE — 76705 ECHO EXAM OF ABDOMEN: CPT | Mod: 26

## 2025-06-20 PROCEDURE — 99285 EMERGENCY DEPT VISIT HI MDM: CPT

## 2025-06-20 PROCEDURE — 99223 1ST HOSP IP/OBS HIGH 75: CPT | Mod: GC

## 2025-06-20 PROCEDURE — 99223 1ST HOSP IP/OBS HIGH 75: CPT

## 2025-06-20 PROCEDURE — 99221 1ST HOSP IP/OBS SF/LOW 40: CPT

## 2025-06-20 PROCEDURE — 99222 1ST HOSP IP/OBS MODERATE 55: CPT

## 2025-06-20 PROCEDURE — 74019 RADEX ABDOMEN 2 VIEWS: CPT | Mod: 26

## 2025-06-20 PROCEDURE — 74182 MRI ABDOMEN W/CONTRAST: CPT | Mod: 26

## 2025-06-20 RX ORDER — ACETAMINOPHEN 500 MG/5ML
725 LIQUID (ML) ORAL ONCE
Refills: 0 | Status: COMPLETED | OUTPATIENT
Start: 2025-06-20 | End: 2025-06-20

## 2025-06-20 RX ORDER — LOPERAMIDE HCL 1 MG/7.5ML
16 SOLUTION ORAL THREE TIMES A DAY
Refills: 0 | Status: DISCONTINUED | OUTPATIENT
Start: 2025-06-20 | End: 2025-06-22

## 2025-06-20 RX ORDER — METOCLOPRAMIDE HCL 10 MG
10 TABLET ORAL ONCE
Refills: 0 | Status: COMPLETED | OUTPATIENT
Start: 2025-06-20 | End: 2025-06-20

## 2025-06-20 RX ORDER — ACETAMINOPHEN 500 MG/5ML
650 LIQUID (ML) ORAL ONCE
Refills: 0 | Status: COMPLETED | OUTPATIENT
Start: 2025-06-20 | End: 2025-06-20

## 2025-06-20 RX ORDER — LORAZEPAM 4 MG/ML
1 VIAL (ML) INJECTION DAILY
Refills: 0 | Status: DISCONTINUED | OUTPATIENT
Start: 2025-06-20 | End: 2025-06-22

## 2025-06-20 RX ORDER — ONDANSETRON HCL/PF 4 MG/2 ML
4 VIAL (ML) INJECTION ONCE
Refills: 0 | Status: DISCONTINUED | OUTPATIENT
Start: 2025-06-20 | End: 2025-06-20

## 2025-06-20 RX ORDER — LORAZEPAM 4 MG/ML
1 VIAL (ML) INJECTION
Refills: 0 | DISCHARGE

## 2025-06-20 RX ORDER — SODIUM CHLORIDE 9 G/1000ML
1000 INJECTION, SOLUTION INTRAVENOUS
Refills: 0 | Status: DISCONTINUED | OUTPATIENT
Start: 2025-06-20 | End: 2025-06-21

## 2025-06-20 RX ORDER — FERROUS SULFATE 137(45) MG
0 TABLET, EXTENDED RELEASE ORAL
Refills: 0 | DISCHARGE

## 2025-06-20 RX ORDER — METHOCARBAMOL 500 MG/1
1 TABLET, FILM COATED ORAL
Refills: 0 | DISCHARGE

## 2025-06-20 RX ORDER — ONDANSETRON HCL/PF 4 MG/2 ML
4 VIAL (ML) INJECTION ONCE
Refills: 0 | Status: COMPLETED | OUTPATIENT
Start: 2025-06-20 | End: 2025-06-20

## 2025-06-20 RX ORDER — ACETAMINOPHEN 500 MG/5ML
1000 LIQUID (ML) ORAL ONCE
Refills: 0 | Status: COMPLETED | OUTPATIENT
Start: 2025-06-20 | End: 2025-06-20

## 2025-06-20 RX ORDER — LOPERAMIDE HCL 1 MG/7.5ML
8 SOLUTION ORAL
Refills: 0 | DISCHARGE

## 2025-06-20 RX ADMIN — Medication 2000 MILLILITER(S): at 09:15

## 2025-06-20 RX ADMIN — LOPERAMIDE HCL 16 MILLIGRAM(S): 1 SOLUTION ORAL at 22:00

## 2025-06-20 RX ADMIN — LOPERAMIDE HCL 16 MILLIGRAM(S): 1 SOLUTION ORAL at 16:04

## 2025-06-20 RX ADMIN — Medication 950 MILLILITER(S): at 04:19

## 2025-06-20 RX ADMIN — Medication 290 MILLIGRAM(S): at 04:42

## 2025-06-20 RX ADMIN — SODIUM CHLORIDE 90 MILLILITER(S): 9 INJECTION, SOLUTION INTRAVENOUS at 19:46

## 2025-06-20 RX ADMIN — Medication 400 MILLIGRAM(S): at 18:45

## 2025-06-20 RX ADMIN — SODIUM CHLORIDE 80 MILLILITER(S): 9 INJECTION, SOLUTION INTRAVENOUS at 10:41

## 2025-06-20 RX ADMIN — Medication 290 MILLIGRAM(S): at 10:41

## 2025-06-20 RX ADMIN — Medication 8 MILLIGRAM(S): at 18:34

## 2025-06-20 RX ADMIN — Medication 8 MILLIGRAM(S): at 05:07

## 2025-06-20 NOTE — DISCHARGE NOTE PROVIDER - HOSPITAL COURSE
C: Patient is a 19y old  Female who presents with a chief complaint of  abdominal pain .  HPI: GERI AKINS is a 19-year-old female with history of cloacal exstrophy, bladder fistula, s/p tethered cord release, bladder reconstruction, and neovagina with Mitrofanoff and colostomy, multiple spinal surgeries with last spinal procedure in December with Dr. Vargas presenting with acute on chronic bilateral upper abdominal pain, increased ostomy output and upper back pain.  Patient notes that she is being worked up outpatient without elucidating a cause for her pain.  She had a CAT scan on Monday that showed heterogeneity within the liver and is due for an ultrasound.  Otherwise CT was unrevealing for the cause of her pain. She endorses nausea and poor p.o. intake secondary to low appetite. She endorses increased abdominal pain x 1 week, increased ostomy output over last several months. She presented to the ED due to worsening of the pain. On presentation pain was 9/10. Now states pain is 7/10.     [facility]: In the ED she was found to be afebrile with vital signs stable, tenderness and inc ostomy output, RUQ US wnl, AXR wnl, bicarb 10,  2x NSB, D5NS @ 1x MIVF, apap, reglan, pyuria with esterase (no urinary sx mentioned). Neurosurgery, GI and surgery consulted in ED    PMHx/Sx: cloacal exstrophy, bladder fistula, s/p tethered cord release, bladder reconstruction, and neovagina with Mitrofanoff and colostomy, multiple spinal surgeries  Rx: Loperamide 2mg 6 caps 4x day PRN, lorazepam 1mg as needed for anxiety, mesalamine ER 500mg 2 caps TID, methocarbamol 500mg BID as needed, norethindrone acetate 5mg BID (same time every morning/evening), odansetron 8mg TID as needed for nausea, Xifaxan 550mg qd  FHx: Celiac disease  Imm: vUTD.  SHx: Lives in [place]. Lives with mom and maternal grandmother. Attends college. Not currently sexually active, never has been. Does not smoke, drink alcohol or use any other substances.   PMD: Ema Mitchell  Pharm: [facility]  Allergies: [This allergen will not trigger allergy alert] Dermabond (Hives)  fluoroquinolone antibiotics (Unknown)  ferric carboxymaltose (Rash (Mild to Mod); Urticaria (Mild to Mod))  ciprofloxacin (Unknown)  nitroimidazole amebicides (Swelling)  latex (Unknown)  Cipro (Hives; Rash)  Flagyl (Hives)  Diet: regular, avoids green leafy vegetables per outside GI C: Patient is a 19y old  Female who presents with a chief complaint of  abdominal pain .  HPI: GERI AKINS is a 19-year-old female with history of cloacal exstrophy, bladder fistula, s/p tethered cord release, bladder reconstruction, and neovagina with Mitrofanoff and colostomy, multiple spinal surgeries with last spinal procedure in December with Dr. Vargas presenting with acute on chronic bilateral upper abdominal pain, increased ostomy output and upper back pain.  Patient notes that she is being worked up outpatient without elucidating a cause for her pain.  She had a CAT scan on Monday that showed heterogeneity within the liver and is due for an ultrasound.  Otherwise CT was unrevealing for the cause of her pain. She endorses nausea and poor p.o. intake secondary to low appetite. She endorses increased abdominal pain x 1 week, increased ostomy output over last several months. She presented to the ED due to worsening of the pain. On presentation pain was 9/10. Now states pain is 7/10.     [facility] [place] [facility]: In the ED she was found to be afebrile with vital signs stable, tenderness and inc ostomy output, RUQ US wnl, AXR wnl, bicarb 10,  2x NSB, D5NS @ 1x MIVF, apap, reglan, pyuria with esterase (no urinary sx mentioned). Neurosurgery, GI and surgery consulted in ED    PMHx/Sx: cloacal exstrophy, bladder fistula, s/p tethered cord release, bladder reconstruction, and neovagina with Mitrofanoff and colostomy, multiple spinal surgeries  Rx: Loperamide 2mg 6 caps 4x day PRN, lorazepam 1mg as needed for anxiety, mesalamine ER 500mg 2 caps TID, methocarbamol 500mg BID as needed, norethindrone acetate 5mg BID (same time every morning/evening), odansetron 8mg TID as needed for nausea, Xifaxan 550mg qd  FHx: Celiac disease  Imm: vUTD.  SHx: Lives in [place]. Lives with mom and maternal grandmother. Attends college. Not currently sexually active, never has been. Does not smoke, drink alcohol or use any other substances.   PMD: Ema Mitchell  Pharm: [facility]  Allergies: [This allergen will not trigger allergy alert] Dermabond (Hives)  fluoroquinolone antibiotics (Unknown)  ferric carboxymaltose (Rash (Mild to Mod); Urticaria (Mild to Mod))  ciprofloxacin (Unknown)  nitroimidazole amebicides (Swelling)  latex (Unknown)  Cipro (Hives; Rash)  Flagyl (Hives)  Diet: regular, avoids green leafy vegetables per outside GI    Pav 3 Course (6/20 - ):  Patient arrived to floor in stable condition on RA. They were well appearing and interactive. Patient continued on IVF until ___ . Case discussed with [facility], comparison of 6/16 CT with historical imaging revealed interval increase in ileal thickness, concerning for inflammatory process. Recommendation to keep NPO to assess for secretory versus malabsorptive/osmotic process, obtain MRE, obtain C diff studies, and screen for celiac disease given a strong family history. Results of this testing included ___ . Physical medicine and rehabilitation consulted for pain management strategies, with recommendations for ___ .    On day of discharge, VS reviewed and remained within normal limits. Child continued to tolerate PO with adequate urine output. Child remained well-appearing, with no concerning findings noted on physical exam. Care plan discussed with caregivers who endorsed understanding. Anticipatory guidance and strict return precautions discussed with caregivers in detail. Child deemed stable for discharge to home. Recommended PMD follow up in 1-2 days of discharge.    Patient is cleared to resume any and all homecare and/or outpatient services and therapies without restriction.    DISCHARGE VITALS:      DISCHARGE EXAM: C: Patient is a 19y old  Female who presents with a chief complaint of  abdominal pain .  HPI: GERI AKINS is a 19-year-old female with history of cloacal exstrophy, bladder fistula, s/p tethered cord release, bladder reconstruction, and neovagina with Mitrofanoff and colostomy, multiple spinal surgeries with last spinal procedure in December with Dr. Vargas presenting with acute on chronic bilateral upper abdominal pain, increased ostomy output and upper back pain.  Patient notes that she is being worked up outpatient without elucidating a cause for her pain.  She had a CAT scan on Monday that showed heterogeneity within the liver and is due for an ultrasound.  Otherwise CT was unrevealing for the cause of her pain. She endorses nausea and poor p.o. intake secondary to low appetite. She endorses increased abdominal pain x 1 week, increased ostomy output over last several months. She presented to the ED due to worsening of the pain. On presentation pain was 9/10. Now states pain is 7/10.     [facility] In the ED she was found to be afebrile with vital signs stable, tenderness and inc ostomy output, RUQ US wnl, AXR wnl, bicarb 10,  2x NSB, D5NS @ 1x MIVF, apap, reglan, pyuria with esterase (no urinary sx mentioned). Neurosurgery, GI and surgery consulted in ED    PMHx/Sx: cloacal exstrophy, bladder fistula, s/p tethered cord release, bladder reconstruction, and neovagina with Mitrofanoff and colostomy, multiple spinal surgeries  Rx: Loperamide 2mg 6 caps 4x day PRN, lorazepam 1mg as needed for anxiety, mesalamine ER 500mg 2 caps TID, methocarbamol 500mg BID as needed, norethindrone acetate 5mg BID (same time every morning/evening), odansetron 8mg TID as needed for nausea, Xifaxan 550mg qd  FHx: Celiac disease  Imm: vUTD.  SHx: Lives in [place]. Lives with mom and maternal grandmother. Attends college. Not currently sexually active, never has been. Does not smoke, drink alcohol or use any other substances.   PMD: Ema Mitchell  Pharm: [facility]  Allergies: [This allergen will not trigger allergy alert] Dermabond (Hives)  fluoroquinolone antibiotics (Unknown)  ferric carboxymaltose (Rash (Mild to Mod); Urticaria (Mild to Mod))  ciprofloxacin (Unknown)  nitroimidazole amebicides (Swelling)  latex (Unknown)  Cipro (Hives; Rash)  Flagyl (Hives)  Diet: regular, avoids green leafy vegetables per outside GI    Pav 3 Course (6/20 -6/22):  Patient arrived to floor in stable condition on RA. They were well appearing and interactive. Patient continued on IVF until ___ . Case discussed with [facility], comparison of 6/16 CT with historical imaging revealed interval increase in ileal thickness, concerning for inflammatory process. Recommendation to keep NPO to assess for secretory versus malabsorptive/osmotic process, with significant improvement in output by 6/21 AM. Bicarb 11 on 6/21, received normal saline bolus. MRE 6/20 showed RLQ hyper-enhancement and wall thickening, consistent with narrowing of lumen. C diff studies ordered, resulted in ***. Given strong family history, screened for Celiac disease, with negative results. Physical medicine and rehabilitation consulted for pain management strategies, with recommendations for amitriptyline, which mother decided to start/defer*****. GI PCR indeterminate for norovirus on 6/20 and 6/21. Advanced diet to regular diet on 6/22, with minimal output. Ordered a patency capsule to assess narrowing of GI lumen on MRE?****.    On day of discharge, VS reviewed and remained within normal limits. Child continued to tolerate PO with adequate urine output. Child remained well-appearing, with no concerning findings noted on physical exam. Care plan discussed with caregivers who endorsed understanding. Anticipatory guidance and strict return precautions discussed with caregivers in detail. Child deemed stable for discharge to home. Recommended PMD follow up in 1-2 days of discharge.    Patient is cleared to resume any and all homecare and/or outpatient services and therapies without restriction.    DISCHARGE VITALS:      DISCHARGE EXAM:   GENERAL: NAD, lying in bed comfortably  HEAD:  Atraumatic, Normocephalic  ENT: Moist mucous membranes  NECK: Supple  RESP: Clear to auscultation bilaterally, good air entry bilaterally; No wheezing, rales, or rhonchi. Unlabored respirations  CARDIAC: Regular rate and rhythm. S1 and S2. No murmurs, rubs, or gallops  GI:  Soft, not distended. Colostomy bag in place on left side with clear yellow output. Mitrofanoff on right side.   EXTREMITIES:  No clubbing, cyanosis, or edema  MSK: FROM all 4 extremities, full and equal strength  SKIN: No rashes, bruises, or other lesions C: Patient is a 19y old  Female who presents with a chief complaint of  abdominal pain .  HPI: GERI AKINS is a 19-year-old female with history of cloacal exstrophy, bladder fistula, s/p tethered cord release, bladder reconstruction, and neovagina with Mitrofanoff and colostomy, multiple spinal surgeries with last spinal procedure in December with Dr. Vargas presenting with acute on chronic bilateral upper abdominal pain, increased ostomy output and upper back pain.  Patient notes that she is being worked up outpatient without elucidating a cause for her pain.  She had a CAT scan on Monday that showed heterogeneity within the liver and is due for an ultrasound.  Otherwise CT was unrevealing for the cause of her pain. She endorses nausea and poor p.o. intake secondary to low appetite. She endorses increased abdominal pain x 1 week, increased ostomy output over last several months. She presented to the ED due to worsening of the pain. On presentation pain was 9/10. Now states pain is 7/10.     [facility] [place] In [place] ED she was found to be afebrile with vital signs stable, tenderness and inc ostomy output, RUQ US wnl, AXR wnl, bicarb 10,  2x NSB, D5NS @ 1x MIVF, apap, reglan, pyuria with esterase (no urinary sx mentioned). Neurosurgery, GI and surgery consulted in ED    PMHx/Sx: cloacal exstrophy, bladder fistula, s/p tethered cord release, bladder reconstruction, and neovagina with Mitrofanoff and colostomy, multiple spinal surgeries  Rx: Loperamide 2mg 6 caps 4x day PRN, lorazepam 1mg as needed for anxiety, mesalamine ER 500mg 2 caps TID, methocarbamol 500mg BID as needed, norethindrone acetate 5mg BID (same time every morning/evening), odansetron 8mg TID as needed for nausea, Xifaxan 550mg qd  FHx: Celiac disease  Imm: vUTD.  SHx: Lives in [place]. Lives with mom and maternal grandmother. Attends college. Not currently sexually active, never has been. Does not smoke, drink alcohol or use any other substances.   PMD: Ema Mitchell  Pharm: [facility]  Allergies: [This allergen will not trigger allergy alert] Dermabond (Hives)  fluoroquinolone antibiotics (Unknown)  ferric carboxymaltose (Rash (Mild to Mod); Urticaria (Mild to Mod))  ciprofloxacin (Unknown)  nitroimidazole amebicides (Swelling)  latex (Unknown)  Cipro (Hives; Rash)  Flagyl (Hives)  Diet: regular, avoids green leafy vegetables per outside GI    Pav 3 Course (6/20 -6/22):  Patient arrived to floor in stable condition on RA. They were well appearing and interactive. Patient continued on IVF until 6/21. Case discussed [place] [facility], [place] of 6/16 CT with historical imaging revealed interval increase in ileal thickness, concerning for inflammatory process. Recommendation to keep NPO to assess for secretory versus malabsorptive/osmotic process, with significant improvement in output by 6/21 AM. Bicarb 11 on 6/21, received normal saline bolus. MRE 6/20 showed RLQ hyper-enhancement and wall thickening, consistent with narrowing of lumen. . Given strong family history, screened for Celiac disease, with negative results. Physical medicine and rehabilitation consulted for pain management strategies, with recommendations for amitriptyline, which mother decided to start. GI PCR indeterminate for norovirus on 6/20 and 6/21. Advanced diet to regular diet on 6/22, with minimal output. Ordered a patency capsule to assess narrowing of GI lumen on MRE and swallowed on 1030am 6/22; instructed to get an abdominal xray if pill isnt passed by 1030am 6/23.    On day of discharge, VS reviewed and remained within normal limits. Child continued to tolerate PO with adequate urine output. Child remained well-appearing, with no concerning findings noted on physical exam. Care plan discussed with caregivers who endorsed understanding. Anticipatory guidance and strict return precautions discussed with caregivers in detail. Child deemed stable for discharge to home. Recommended PMD follow up in 1-2 days of discharge.    Patient is cleared to resume any and all homecare and/or outpatient services and therapies without restriction.    DISCHARGE VITALS:  ICU Vital Signs Last 24 Hrs  T(C): 36.3 (22 Jun 2025 10:25), Max: 36.7 (21 Jun 2025 22:09)  T(F): 97.3 (22 Jun 2025 10:25), Max: 98 (21 Jun 2025 22:09)  HR: 85 (22 Jun 2025 10:25) (80 - 102)  BP: 102/70 (22 Jun 2025 10:25) (90/58 - 102/70)  BP(mean): 80 (22 Jun 2025 10:25) (72 - 96)  ABP: --  ABP(mean): --  RR: 16 (22 Jun 2025 10:25) (16 - 18)  SpO2: 97% (22 Jun 2025 10:25) (95% - 98%)    O2 Parameters below as of 22 Jun 2025 10:25  Patient On (Oxygen Delivery Method): room air      DISCHARGE EXAM:   GENERAL: NAD, lying in bed comfortably  HEAD:  Atraumatic, Normocephalic  ENT: Moist mucous membranes  NECK: Supple  RESP: Clear to auscultation bilaterally, good air entry bilaterally; No wheezing, rales, or rhonchi. Unlabored respirations  CARDIAC: Regular rate and rhythm. S1 and S2. No murmurs, rubs, or gallops  GI:  Soft, not distended. Colostomy bag in place on left side with clear yellow output. Mitrofanoff on right side.   EXTREMITIES:  No clubbing, cyanosis, or edema  MSK: FROM all 4 extremities, full and equal strength  SKIN: No rashes, bruises, or other lesions

## 2025-06-20 NOTE — H&P PEDIATRIC - NSHPPHYSICALEXAM_GEN_ALL_CORE
Measurements:    Weight (kg): 48.5 (06-20-25 @ 01:16)    Vital Signs:  T(F): 97.7 (06-20-25 @ 11:35), Max: 98.6 (06-20-25 @ 04:10)  HR: 90 (06-20-25 @ 11:35) (77 - 100)  BP: 109/71 (06-20-25 @ 11:35) (96/64 - 112/79)  RR: 20 (06-20-25 @ 11:35) (18 - 20)  SpO2: 98% (06-20-25 @ 11:35) (97% - 100%)    ___ PHYSICAL EXAM:  Vital Signs Last 24 Hrs  T(C): 36.5 (20 Jun 2025 11:35), Max: 37 (20 Jun 2025 04:10)  T(F): 97.7 (20 Jun 2025 11:35), Max: 98.6 (20 Jun 2025 04:10)  HR: 90 (20 Jun 2025 11:35) (77 - 100)  BP: 109/71 (20 Jun 2025 11:35) (96/64 - 112/79)  BP(mean): --  RR: 20 (20 Jun 2025 11:35) (18 - 20)  SpO2: 98% (20 Jun 2025 11:35) (97% - 100%)    Parameters below as of 20 Jun 2025 10:50  Patient On (Oxygen Delivery Method): room air        PHYSICAL EXAM:  GENERAL: NAD, lying in bed comfortably  HEAD:  Atraumatic, Normocephalic  ENT: Moist mucous membranes  NECK: Supple  RESP: Clear to auscultation bilaterally, good air entry bilaterally; No wheezing, rales, or rhonchi. Unlabored respirations  CARDIAC: Regular rate and rhythm. S1 and S2. No murmurs, rubs, or gallops  GI:  Soft, mildly tender to palpation bilateral upper quadrants. Not distended. Colostomy bag in place on left side with clear yellow output. Mitrofanoff on right side. No hepatomegaly. No splenomegaly.  EXTREMITIES:  No clubbing, cyanosis, or edema  MSK: FROM all 4 extremities, full and equal strength  SKIN: No rashes, bruises, or other lesions PHYSICAL EXAM:  Vital Signs Last 24 Hrs  T(C): 36.5 (20 Jun 2025 11:35), Max: 37 (20 Jun 2025 04:10)  T(F): 97.7 (20 Jun 2025 11:35), Max: 98.6 (20 Jun 2025 04:10)  HR: 90 (20 Jun 2025 11:35) (77 - 100)  BP: 109/71 (20 Jun 2025 11:35) (96/64 - 112/79)  RR: 20 (20 Jun 2025 11:35) (18 - 20)  SpO2: 98% (20 Jun 2025 11:35) (97% - 100%)    Parameters below as of 20 Jun 2025 10:50  Patient On (Oxygen Delivery Method): room air        PHYSICAL EXAM:  GENERAL: NAD, lying in bed comfortably  HEAD:  Atraumatic, Normocephalic  ENT: Moist mucous membranes  NECK: Supple  RESP: Clear to auscultation bilaterally, good air entry bilaterally; No wheezing, rales, or rhonchi. Unlabored respirations  CARDIAC: Regular rate and rhythm. S1 and S2. No murmurs, rubs, or gallops  GI:  Soft, mildly tender to palpation bilateral upper quadrants. Not distended. Colostomy bag in place on left side with clear yellow output. Mitrofanoff on right side. No hepatomegaly. No splenomegaly.  EXTREMITIES:  No clubbing, cyanosis, or edema  MSK: FROM all 4 extremities, full and equal strength  SKIN: No rashes, bruises, or other lesions

## 2025-06-20 NOTE — CONSULT NOTE PEDS - TIME BILLING
Patient seen and examined in [facility]. Patient known to me from prior surgeries and office visits.  Most recently, lumbosacral fusion in 12/2024.  Pt now with recent history of R sided abdominal pain / tenderness to palpation.  Imaging studies reviewed, demonstrate post-op changes with R  iliac screw loosening.  Pt's symptoms appear to be more Gi-related. No Neurosurgical intervention anticipated. Reconsult PRN. Patient may F/U w/ me in office as outpatient.

## 2025-06-20 NOTE — ED PEDIATRIC TRIAGE NOTE - CHIEF COMPLAINT QUOTE
Pt coming in for upper back pain radiating downwards and to sides. +nausea. Denies fevers. Pmhx: spondylosis lumbar spine, tethered cord, foot deformity. Allergies: cipro, flagyl, dermabond, latex. VUTD. Pt coming in for upper back pain radiating downwards and to sides. +nausea. Denies fevers. Pmhx: spondylosis lumbar spine, tethered cord, foot deformity, extensive pmhx. Allergies: cipro, flagyl, dermabond, latex. VUTD.

## 2025-06-20 NOTE — CONSULT NOTE PEDS - ASSESSMENT
19F with a complex abdominal and  surgery history with exacerbation of subacute on chronic abdominal pain and increased output from colostomy    Plan:   RUQ U/S  Abd XR  F/U labs      note incomplete 19F with a complex abdominal and  surgery history with exacerbation of subacute on chronic abdominal pain and increased output from colostomy    Plan:   NSGY to see  RUQ U/S  Abd XR  F/U labs  Analgesia PRN  Antiemetics PRN  Dispo pending  Discussed with peds surgery fellow on behalf of Dr Lara      Peds Surg       19F with a complex abdominal and  surgery history with exacerbation of subacute on chronic abdominal pain and increased output from colostomy    Plan:   NSGY to see  Recommend GI to see  RUQ U/S  Abd XR  F/U labs  Analgesia PRN  Antiemetics PRN  Dispo pending  Discussed with peds surgery fellow on behalf of Dr Lara      Peds Surg

## 2025-06-20 NOTE — ED PEDIATRIC NURSE NOTE - CHIEF COMPLAINT QUOTE
Pt coming in for upper back pain radiating downwards and to sides. +nausea. Denies fevers. Pmhx: spondylosis lumbar spine, tethered cord, foot deformity, extensive pmhx. Allergies: cipro, flagyl, dermabond, latex. VUTD.

## 2025-06-20 NOTE — DISCHARGE NOTE PROVIDER - ATTENDING DISCHARGE PHYSICAL EXAMINATION:
Gen: NAD, appears comfortable  HEENT: NCAT, MMM, Throat clear, PERRLA, EOMI, clear conjunctiva  Neck: supple  Heart: S1S2+, RRR, no murmur, cap refill < 2 sec, 2+ peripheral pulses  Lungs: normal respiratory pattern, CTAB  Abd: soft, NT, ND, BSP, no HSM. Colostomy bag in place on left side with yellow output. Mitrofanoff on right side.   Ext: FROM, no edema, no tenderness  Neuro: no focal deficits, awake, alert, no acute change from baseline exam  Skin: no rash, intact and not indurated

## 2025-06-20 NOTE — ED PROVIDER NOTE - PHYSICAL EXAMINATION
Const: Awake, alert, no acute distress.    HEENT: NC/AT.  Moist mucous membranes.  Eyes: Extraocular movements intact b/l.  No scleral icterus.  Neck: Full ROM without pain.  Cardiac: Extremities well perfused, normal coloration, no peripheral cyanosis.  No peripheral edema.  Resp: Speaking in full sentences.  No evidence of respiratory distress. CTAB  Abd: Non-distended. soft. Ostomy bag in place. TTP in all upper quadrants.  Skin: Normal coloration.  No rashes, abrasions or lacerations  MSK: TTP where T meets L spine. No stepoffs, swelling, redness..  Neuro: Awake, alert & oriented x 3.  Moves all extremities symmetrically.  No obvious focal deficits.

## 2025-06-20 NOTE — ED PROVIDER NOTE - NSICDXPASTMEDICALHX_GEN_ALL_CORE_FT
PAST MEDICAL HISTORY:  Back pain     Cavus deformity of foot     Cloacal Exstrophy     Colostomy in place     Congenital Imperforate Anus     Gastroesophageal reflux disease, esophagitis presence not specified     Gastrointestinal bleeding     H/O dislocation of hip Followed by Orthopedist in [facility]     Iron deficiency anemia, unspecified iron deficiency anemia type     Neurogenic bladder     Other specified congenital malformations of spinal cord     Spondylosis of lumbar spine     Tethered Cord spinal leakage with surgery 5/2016    Urinary leakage     Viral meningitis May 2016

## 2025-06-20 NOTE — CONSULT NOTE PEDS - ATTENDING COMMENTS
19-year-old female with cloacal exstrophy, bladder fistula, tethered cord and imperforate anus s/p numerous surgical interventions including bladder reconstruction, neovagina, Mitrofanoff creation, colostomy creation with previous diverting ileostomy, s/p bowel resection of strictured bowel  and stomal revision in 2021 and numerous tethered cord releases here with abdominal pain and elevated ostomy output. Differential of elevated output includes infection vs. inflammatory vs. obstructive. Colonoscopy through the stoma in April with active colitis in the distal colon and CT abdomen with IV contrast on 6/16 which showed thickening of the bowel wall in the ileum, not present on CT in March, which may represent inflammatory disease of the bowel. To better characterize, will obtain MRE of the abdomen. Will also consider endoscopic reevaluation.    Recommend:  - Keep NPO on IV fluids  - Send C diff from stomal output  - MRE of abdomen and pelvis  - Given strong family history of celiac disease, please send TTG IgA and quant IgA  - Continue home loperamide 16 mg TID  - Hold home mesalamine
Patient with h/o cloacal extrophy/ extensive surgical history  Presents to the ED with persistent abd pain, decreased PO, watery stoma output, wt loss, no emesis  H/o colitis on prior colonoscopy - followed by GI  Recent CT scan w/o obstruction  RUQ US - no cholelithiasis/ cystitis  Plain film with non-obstructive bowel gas pattern  Nml WBC  Mild electrolyte changes    Afeb, HD stable  Gen:  NAD  Abd:  non-distended, minimal tenderness, no rebound, no guarding, stomas intact, colostomy output watery    No indication for surgical intervention at this time  Recommend GI eval

## 2025-06-20 NOTE — CONSULT NOTE PEDS - SUBJECTIVE AND OBJECTIVE BOX
HPI: 19-year-old female with cloacal exstrophy, bladder fistula, tethered cord and imperforate anus s/p numerous surgical interventions including bladder reconstruction, neovagina, Mitrofanoff creation, colostomy creation with previous diverting ileostomy, s/p bowel resection of strictured bowel  and stomal revision in 2021 and numerous tethered cord releases here with abdominal pain and elevated ostomy output. In the last 2-3 months she has had looser stomal output with more frequent bag changes, at baseline changes the bag 2-3 times during the day, now changing it 3-4 times per day. Also with worsening of abdominal pain over the last few months. CT abdomen with IV and oral contrast in March showed mild wall thickening and mucosal enhancement surrounding left lower quadrant colostomy site without upstream dilatation to suggest obstruction. She had a colonoscopy through the stoma in April with normal mucosa of colon noted from level of stoma to 15cm proximally, however histopathology with active colitis. She had a repeat CT abdomen with IV contrast on 6/16 which showed thickening of the bowel wall in the ileum. Unable to tolerate oral contrast due to nausea. Given continued abdominal pain and worsening ostomy output, she presented to the ED. She has been on Mesalamine for over 5 years for inflammation in the bowel, started initially for focal acute and chronic ileitis and anastomotic ulcers. She has been on loperamide, most recently 16 mg TID, for intermittent loose stomal output. She has also been on standing Xifaxan.     ED Course: CBC with platelets elevated to 474. CMP with sodium 132, bicarb 10, BUN elevated to 34, creatinine 0.88, albumin 5.5. Lipase 47. RUQ US unremarkable. Abdominal x-ray unremarkable.       Allergies    [This allergen will not trigger allergy alert] Dermabond (Hives)  fluoroquinolone antibiotics (Unknown)  ferric carboxymaltose (Rash (Mild to Mod); Urticaria (Mild to Mod))  nitroimidazole amebicides (Swelling)  latex (Unknown)  Cipro (Hives; Rash)  Flagyl (Hives)    Intolerances      MEDICATIONS  (STANDING):  dextrose 5% + sodium chloride 0.9%. - Pediatric 1000 milliLiter(s) (80 mL/Hr) IV Continuous <Continuous>    MEDICATIONS  (PRN):      PAST MEDICAL & SURGICAL HISTORY:  Congenital Imperforate Anus    Tethered Cord  spinal leakage with surgery 5/2016    Cloacal Exstrophy    Colostomy in place    Neurogenic bladder    Urinary leakage    Iron deficiency anemia, unspecified iron deficiency anemia type    Gastroesophageal reflux disease, esophagitis presence not specified    Headache    Viral meningitis  May 2016    Back pain    Gastrointestinal bleeding    Cavus deformity of foot    Other specified congenital malformations of spinal cord    H/O dislocation of hip  Followed by Orthopedist in [place]    Spondylosis of lumbar spine    S/P Ileostomy    S/P Colostomy  S/P revision of colostomy on 5/2021- pt had loop in her bowel    Tethered cord  repaired x3 thus far. Last being 9/2012., laminectomy 2016    S/P urinary bladder replacement  Mitrofanoff 4/2011    Cloacal extrophy of urinary bladder  clitoroplasty, umbilicoplasty, labioplasty, retrograde ureterogram by Chrissy.  closure off cloacal/bladder extrophy, placement of open ureteral stent by Gitlin    Pelvic deformity  Adjustment of external fixator, with removal of iliac wing pins (2), bilateral irrigation and debridement of open wounds around pins in anterior portion of pelvis, bilateral by Kylee.  Leg length discrepancy surgery 2018  EUA by Gitlin    Pelvic deformity  Removal of pelvic external fixator    Bladder stone  Removal of bladder stone by Gitlin    Cloacal exstrophy  evaluation of fallopian tubes(chromotubation) pelvic reconstruction by Rudy    Cloacal exstrophy  Right jugular central venous catheter, cystourethroscopy, stone extraction, laparotomy, lysis of adhesions, takedown of colostomy, creation of Walker,, creation of neovagina with small bowel, anastomosis of neovagina to bilateral hemicervical cuff by Meredith Hall  S/P pull-through for complex cloacal extrophy, EUA and placement of wound VAC by Kat    Wound  EUA and VAC dressing change  3/17/08, 3/20/08, 3/23/08    Imperforate anus  reconstruction of perineal body, closure of posterior sagittal wound by Kat    Imperforate anus  Cystovaginoscopy, redo PSARP    Tethered cord  L4-5, S1, 2 and 3 laminectomy for detethering of spinal cord and L4-5 S1, 2 and 3 laminectomy for 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  Toe flexor lengthening, third threough 5th by Kylee    Cloacal exstrophy  EUA, cystoscopy, vaginoscopy, cystogram and removal of suture by Gitlin    Imperforate anus  PSARP by Kat Boothe exstrophy  Ileostomy takedown, pulled through segment of colon out of pelvis and created end colostomy by Kat  30 cm segment of small bowel for bladder augment by Chrissy and Gitlin    Wound  EUA, VAC placement for abdominal wounds by Kat    Clomahoganyl exstrophy  cystoscopy of Mitrofanoff channel and EUA by Gitlin    Nevus  excision of nevus of right thigh/buttock crease, revision of colostomy, cystoscopy and cystogram by Kat    Tethered cord  Lumbosacral laminectomy, L4-5 and S1, for detethering of joshua cord by Sam    Stenosis of urinary meatus  endoscopy of Mitrofanoff by David    Bladder fistula  Resection and repair by Victorina    Cloacal exstrophy  Revision of Mitrofanoff, abdominal exploration and lysis of adhesions, retroperitoneal exploration, closure of vesico-cutaneous fistula by Chrissy    S/P lumbar laminectomy  4/28/15 Dr. Vargas    Cloacal exstrophy  Revision of Mitrofanoff Sept 2015    Tethered cord  Subsequent repair with cerebral spinal fluid collection repair on 5/6/2016 with Dr. Vargas    H/O foot surgery  left February 2017      REVIEW OF SYSTEMS  All review of systems negative, except for those noted above     Daily     Daily   BMI: 22.4 (06-20 @ 01:16)  Change in Weight:  Vital Signs Last 24 Hrs  T(C): 36.5 (20 Jun 2025 11:35), Max: 37 (20 Jun 2025 04:10)  T(F): 97.7 (20 Jun 2025 11:35), Max: 98.6 (20 Jun 2025 04:10)  HR: 90 (20 Jun 2025 11:35) (77 - 100)  BP: 109/71 (20 Jun 2025 11:35) (96/64 - 112/79)  BP(mean): --  RR: 20 (20 Jun 2025 11:35) (18 - 20)  SpO2: 98% (20 Jun 2025 11:35) (97% - 100%)    Parameters below as of 20 Jun 2025 10:50  Patient On (Oxygen Delivery Method): room air      I&O's Detail      PHYSICAL EXAM  General:  Well developed, alert and active, no pallor, NAD.  HEENT:    Normal appearance of conjunctiva, ears, nose, lips, oral mucosa, and oropharynx, anicteric.  Respiratory:  normal respiratory effort.   Abdominal:   soft, no masses, non-tender without distension, colostomy pink with thin output in stomal bag, ostomy bag covering Mitrofanoff   Skin:   No rash, jaundice, lesions, or eczema.   Musculoskeletal:  No joint swelling, erythema or tenderness.   Neuro: No focal deficits.   Other:     Lab Results:                        14.7   10.26 )-----------( 474      ( 20 Jun 2025 05:51 )             43.1     06-20    132[L]  |  100  |  34[H]  ----------------------------<  95  3.9   |  10[LL]  |  0.88    Ca    10.4      20 Jun 2025 05:51    TPro  9.3[H]  /  Alb  5.5[H]  /  TBili  0.9  /  DBili  x   /  AST  21  /  ALT  19  /  AlkPhos  125[H]  06-20    LIVER FUNCTIONS - ( 20 Jun 2025 05:51 )  Alb: 5.5 g/dL / Pro: 9.3 g/dL / ALK PHOS: 125 U/L / ALT: 19 U/L / AST: 21 U/L / GGT: x           PT/INR - ( 20 Jun 2025 05:51 )   PT: 12.7 sec;   INR: 1.07 ratio         PTT - ( 20 Jun 2025 05:51 )  PTT:29.8 sec      Stool Results:          RADIOLOGY RESULTS:    SURGICAL PATHOLOGY:

## 2025-06-20 NOTE — CONSULT NOTE PEDS - SUBJECTIVE AND OBJECTIVE BOX
20yo female with a history of cloacal exstrophy, bladder fistula, s/p tethered cord release X 3, L-1 VCR and T11-L3 fusion in 2022, L4-S1 laminectomy and extension of fusion T10-L2 in December 2024,, bladder reconstruction, and neovagina with Mitrofanoff and colostomy presents to ED with subacute on chronic abd pain, mainly to R side/ RUQ, and upper to mid thoracic paraspinal pain radiating to her flanks. She was recently outpatient by general surgery for similar symptoms and a recent CT was not concerning for bowel obstruction and did not show gallstones. A colonoscopy demonstrated signs of active colitis, which she has been following with GI for. Her ostomy output is managed with Imodium BID, mesalamine, and pectin, but per pt ostomy output has been higher than usual and consistency a little more thin/yellow/mucoid. Pt C/O right ankle intermittently "locking up" but denies and focal motor or sensory loss, also reports associated nausea and decreased PO intake, no vomiting, no fever, no other acute complaints at this time.     PAST MEDICAL & SURGICAL HISTORY:  Congenital Imperforate Anus  Tethered Cord  spinal leakage with surgery 5/2016  Cloacal Exstrophy  Colostomy in place  Neurogenic bladder  Urinary leakage  Iron deficiency anemia, unspecified iron deficiency anemia type  Gastroesophageal reflux disease, esophagitis presence not specified  Headache  Viral meningitis  May 2016  Back pain  Gastrointestinal bleeding  Cavus deformity of foot  Other specified congenital malformations of spinal cord  H/O dislocation of hip  Followed by Orthopedist in [place]  Spondylosis of lumbar spine  S/P Ileostomy  S/P Colostomy  S/P revision of colostomy on 5/2021- pt had loop in her bowel  Tethered cord  repaired x3 thus far. Last being 9/2012., laminectomy 2016  S/P urinary bladder replacement  Mitrofanoff 4/2011  Cloacal extrophy of urinary bladder  clitoroplasty, umbilicoplasty, labioplasty, retrograde ureterogram by Chrissy.  closure off cloacal/bladder extrophy, placement of open ureteral stent by Gitlin  Pelvic deformity  Adjustment of external fixator, with removal of iliac wing pins (2), bilateral irrigation and debridement of open wounds around pins in anterior portion of pelvis, bilateral by Kylee.  Leg length discrepancy surgery 2018  EUA by Gitlin  Pelvic deformity  Removal of pelvic external fixator  Bladder stone  Removal of bladder stone by Gitlin  Cloacal exstrophy  evaluation of fallopian tubes(chromotubation) pelvic reconstruction by Rudy Hallacal exstrophy  Right jugular central venous catheter, cystourethroscopy, stone extraction, laparotomy, lysis of adhesions, takedown of colostomy, creation of Walker,, creation of neovagina with small bowel, anastomosis of neovagina to bilateral hemicervical cuff by Meredith Hall  S/P pull-through for complex cloacal extrophy, EUA and placement of wound VAC by Kat  Wound  EUA and VAC dressing change  3/17/08, 3/20/08, 3/23/08  Imperforate anus  reconstruction of perineal body, closure of posterior sagittal wound by Kat Whiteforwillow anus  Cystovaginoscopy, redo PSARP  Tethered cord  L4-5, S1, 2 and 3 laminectomy for detethering of spinal cord and L4-5 S1, 2 and 3 laminectomy for 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  Toe flexor lengthening, third threough 5th by Kylee  Cloacal exstnicky  EUA, cystoscopy, vaginoscopy, cystogram and removal of suture by Gitlin  Imperforwillow anus  PSARP by Kat carter  Ileostomy takedown, pulled through segment of colon out of pelvis and created end colostomy by Kat  30 cm segment of small bowel for bladder augment by Reda and Gitlin  Wound  EUA, VAC placement for abdominal wounds by Kat carter  cystoscopy of Mitrofanoff channel and EUA by Gitlin  Nevus  excision of nevus of right thigh/buttock crease, revision of colostomy, cystoscopy and cystogram by Kat  Tethered cord  Lumbosacral laminectomy, L4-5 and S1, for detethering of joshua cord by Sam  Stenosis of urinary meatus  endoscopy of Mitrofanoff by David  Bladder fistula  Resection and repair by Victorina  Cloacal exstrophy  Revision of Mitrofanoff, abdominal exploration and lysis of adhesions, retroperitoneal exploration, closure of vesico-cutaneous fistula by Reda  S/P lumbar laminectomy  4/28/15 Dr. Vargas  Cloacal exstrophy  Revision of Mitrofanoff Sept 2015  H/O endoscopy  Tethered cord  Subsequent repair with cerebral spinal fluid collection repair on 5/6/2016 with Dr. Vargas  H/O foot surgery  left February 2017    WDWN female in NAD  Vital Signs Last 24 Hrs  T(C): 36.4 (20 Jun 2025 01:16), Max: 36.4 (20 Jun 2025 01:16)  T(F): 97.5 (20 Jun 2025 01:16), Max: 97.5 (20 Jun 2025 01:16)  HR: 100 (20 Jun 2025 01:16) (100 - 100)  BP: 105/70 (20 Jun 2025 01:16) (105/70 - 105/70)  BP(mean): --  RR: 20 (20 Jun 2025 01:16) (20 - 20)  SpO2: 100% (20 Jun 2025 01:16) (100% - 100%)    Parameters below as of 20 Jun 2025 01:16  Patient On (Oxygen Delivery Method): room air    AAO X 3  PERRLA, EOMI  CN 2-12 grossly intact  BUSTOS strength 5/5  SILT  No clonus

## 2025-06-20 NOTE — ED PROVIDER NOTE - PROGRESS NOTE DETAILS
Received sign out from my colleague, Dr. Mcgill.  In brief, 18 yo female cloacal exstrophy, bladder fistual with neovagina and mitrofanoff, tethered cord with release and multiple laminectomies (last surgery Dec 2024) here with abdominal pain and increased colostomy output.  Recent abd CT outpatient with surgical team reported as unremarkable other than liver heterogeneity.  No fevers.  Has been evaluated over night by neurosurgical and surgery teams and no acute need for intervention identified.  Both teams recommending GI consult given inc colostomy output and abd pain.  Liver US unremarkable (to f/u CT scan), xrays unrmarkable, labs revealed bicarb 10 likely due to output.  Will admit for IV hydration to correct electrolytes and replenish fluids, awaiting GI recs on that.  GI PCR as well.  JARED Duckworth, DORA Attending Received sign out from my colleague, Dr. Mcgill.  In brief, 20 yo female cloacal exstrophy, bladder fistual with neovagina and mitrofanoff, tethered cord with release and multiple laminectomies (last surgery Dec 2024) here with abdominal pain and increased colostomy output.  Recent abd CT outpatient with surgical team reported as unremarkable other than liver heterogeneity.  No fevers.  Has been evaluated over night by neurosurgical and surgery teams and no acute need for intervention identified.  Both teams recommending GI consult given inc colostomy output and abd pain.  Liver US unremarkable (to f/u CT scan), xrays unrmarkable, labs revealed bicarb 10 likely due to output, Urine WBC 25 though baseline has WBCs given self caths and likely chronic colonization, will send UCx to follow up, if any concerns will d/w urology.  Will admit for IV hydration to correct electrolytes and replenish fluids, awaiting GI recs on that.  GI PCR as well.  JARED Duckworth, DORA Attending

## 2025-06-20 NOTE — ED PROVIDER NOTE - OBJECTIVE STATEMENT
19-year-old female with complex surgical history including colostomy placement, multiple spinal surgeries with last spinal procedure in December with Dr. Vargas presenting with acute on chronic bilateral upper abdominal pain and upper back pain.  Patient and mother note that she is being worked up outpatient without elucidating a cause for her pain.  She had a CAT scan on Monday that showed heterogeneity within the liver and is due for an ultrasound.  Otherwise CT was unrevealing for the cause of her pain.  She notes that there was some concern with a screw placed during her last spinal procedure but has not been able to follow-up with her neurosurgeon.  She denies any fever, runny nose cough, sore throat, chest pain, shortness of breath, vomiting. endorses nausea and poor p.o. intake secondary to low appetite. Took relaxers and Tylenol today without relief of her pain.  Patient she was sent in by Dr. Lara.

## 2025-06-20 NOTE — CONSULT NOTE PEDS - ASSESSMENT
20 YO female with a complex history of spinal surgeries presenting to ED with paraspinal thoracic pain radiating to her flanks and abdominal pain. No acute neurosurgical intervention at this time.

## 2025-06-20 NOTE — DISCHARGE NOTE PROVIDER - NSDCFUSCHEDAPPT_GEN_ALL_CORE_FT
Ananya Thibodeaux  [facility] Physician [facility]  PEDHEMONC [address]  Scheduled Appointment: 06/27/2025    [facility]  ULTRASND  [address]  Scheduled Appointment: 06/28/2025    Humberto Lino  [facility]  PEDSURG [address]  Scheduled Appointment: 07/02/2025    Keisha Bashir  [facility]  PEDGEN [address]  Scheduled Appointment: 07/18/2025     [facility] Physician [facility]  CATSCAN  [address]  Scheduled Appointment: 07/14/2025    Keisha Bashir  [facility]  PEDGEN [address]  Scheduled Appointment: 07/18/2025    Jenny Rai  [facility]  PEDGASTMONSERRAT [place]  Scheduled Appointment: 08/04/2025

## 2025-06-20 NOTE — CONSULT NOTE PEDS - ASSESSMENT
19-year-old female with history of cloacal exstrophy, bladder fistula, s/p tethered cord release, bladder reconstruction, and neovagina with Mitrofanoff and colostomy, multiple spinal surgeries with last spinal procedure in December with Dr. Vargas presenting with acute on chronic bilateral upper abdominal pain, i      Differential Diagnosis besides visceral pathologies  1. abdominal migraine: not likely she does not get aura  2. Visceral hypersensitivity leading to dysesthesia: carnette sign is positive   3. musculoskeletal abdominal wall pain: possible based on carnette sign +ve but the location is bilatearl and it does not go along      recommendation  1. rule out GI visceral pathologies first  2. i explained that amiltryptiline can be offered but based on so many complicated history it is very possible that it might not be efficacious option   please consult back GI or WIll defer to primary team and if primary team deems that amiltrypiltine can be tried consider starting with 25mg BID and increase upto 25mg and 50mg at night

## 2025-06-20 NOTE — ED PEDIATRIC NURSE REASSESSMENT NOTE - NS ED NURSE REASSESS COMMENT FT2
Bedside report received and ID band verified. Side rails up and bed locked in lowest position. Patient and parents updated about plan of care. Purposeful rounding done, including call bell in reach and comfort measures addressed. Fall prevention teaching provided Pt up and ambulatory ad caroline to X-ray and to the bathroom PRN. IV sites JHOAN. MARZENA Barnett RN
MD Mcgill at bedside at this time for assessment. Pt on pulse ox. Will continue to monitor.
Neurosurgery MD at bedside. Will continue to monitor.
PIV placed. PIV intact, flushes easily, no redness or swelling noted to area. NS bolus running at this time. MD at bedside. Will continue to monitor.
Patient is awake and alert and was complaining of pain in right arm. Upon assessment, patient stated arm felt like it was burning and itching "really bad". upon further evaluation. patient had few raised white, bumps on forearm. Normal saline bolus was running at the time. Bolus paused and disconnected immediately, ED MD called to bedside for further evaluation. Flushed IV with normal saline flush, patient states no pain or issues. OK to start IV tylenol as per ED MD and ok to start as per patient and patient mother. ED MD made aware, Charge RN made aware.
Ped Surgery MD back at bedside. Will continue to monitor.
Peds Surgery MD at bedside. Will continue to monitor.
Pt resting comfortably, easy to wake. VS as charted. Pt complaining of pain in back. PIVs intact, no redness or swelling to area, flush easily. Waiting for GI and surgical consults. Mom at bedside, plan of care discussed. Will continue to monitor.
US at bedside at this time.
Pt c/o back pain again, Tylenol administered. Bolus complete and maintenance fluids started. Will give bedside report on PAV 3. MARZENA Barnett RN

## 2025-06-20 NOTE — CONSULT NOTE PEDS - SUBJECTIVE AND OBJECTIVE BOX
PEDIATRIC GENERAL SURGERY CONSULT NOTE    SIMON AKINS  |  [id number]   |   19yFemale   |   .[facility]      Patient is a 19y old  Female who presents with a chief complaint of abd pain    HPI:  Simon is a 18yo female with a history of cloacal exstrophy, bladder fistula, s/p tethered cord release, bladder reconstruction, and neovagina with Mitrofanoff and colostomy presents to ED with subacute on chronic abd pain, mainly to R side/ RUQ. She was recently seen in the office for similar symptoms and a recent CT was not concerning for blockage and did not show gallstones. A colonoscopy demonstrated signs of active colitis. Her ostomy output is managed with Imodium BID, mesalamine, and pectin, but it continues to produce a high output. No nausea/vomiting, no other acute complaints at this time.       PRENATAL/BIRTH HISTORY:  [  ] Term   [  ] Pre-term   Gest Age (wks):	               Apgars:                    Birth Wt:  [  ] Spontaneous Vaginal Delivery	              [  ]     reason:    PAST MEDICAL & SURGICAL HISTORY:  Congenital Imperforate Anus      Tethered Cord  spinal leakage with surgery 2016      Cloacal Exstrophy      Colostomy in place      Neurogenic bladder      Urinary leakage      Iron deficiency anemia, unspecified iron deficiency anemia type      Gastroesophageal reflux disease, esophagitis presence not specified      Headache      Viral meningitis  May 2016      Back pain      Gastrointestinal bleeding      Cavus deformity of foot      Other specified congenital malformations of spinal cord      H/O dislocation of hip  Followed by Orthopedist in [place]      Spondylosis of lumbar spine      S/P Ileostomy      S/P Colostomy  S/P revision of colostomy on 2021- pt had loop in her bowel      Tethered cord  repaired x3 thus far. Last being 2012., laminectomy       S/P urinary bladder replacement  Mitrofanoff 2011      Cloacal extrophy of urinary bladder  clitoroplasty, umbilicoplasty, labioplasty, retrograde ureterogram by Chrissy.  closure off cloacal/bladder extrophy, placement of open ureteral stent by Gitlin      Pelvic deformity  Adjustment of external fixator, with removal of iliac wing pins (2), bilateral irrigation and debridement of open wounds around pins in anterior portion of pelvis, bilateral by Kylee.  Leg length discrepancy surgery 2018  EUA by Gitlin      Pelvic deformity  Removal of pelvic external fixator      Bladder stone  Removal of bladder stone by Gitlin      Cloacal exstrophy  evaluation of fallopian tubes(chromotubation) pelvic reconstruction by Appelbaum      Cloacal exstrophy  Right jugular central venous catheter, cystourethroscopy, stone extraction, laparotomy, lysis of adhesions, takedown of colostomy, creation of Walker,, creation of neovagina with small bowel, anastomosis of neovagina to bilateral hemicervical cuff by Meredith Hall  S/P pull-through for complex cloacal extrophy, EUA and placement of wound VAC by Kat      Wound  EUA and VAC dressing change  3/17/08, 3/20/08, 3/23/08      Imperforate anus  reconstruction of perineal body, closure of posterior sagittal wound by Kat      Imperforate anus  Cystovaginoscopy, redo PSARP      Tethered cord  L4-5, S1, 2 and 3 laminectomy for detethering of spinal cord and L4-5 S1, 2 and 3 laminectomy for 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  Toe flexor lengthening, third threough 5th by Kylee      Cloacal exstnicky  EUA, cystoscopy, vaginoscopy, cystogram and removal of suture by Gitlin      Imperforate anus  PSARP by Kat      Cloivone exstrophy  Ileostomy takedown, pulled through segment of colon out of pelvis and created end colostomy by Kat  30 cm segment of small bowel for bladder augment by Chrissy and Gitlin      Wound  EUA, VAC placement for abdominal wounds by Kat      Cloacal exstrophy  cystoscopy of Mitrofanoff channel and EUA by Gitlin      Nevus  excision of nevus of right thigh/buttock crease, revision of colostomy, cystoscopy and cystogram by Kat      Tethered cord  Lumbosacral laminectomy, L4-5 and S1, for detethering of joshua cord by Sam      Stenosis of urinary meatus  endoscopy of Mitrofanoff by David      Bladder fistula  Resection and repair by Victorina      Cloacal exstrophy  Revision of Mitrofanoff, abdominal exploration and lysis of adhesions, retroperitoneal exploration, closure of vesico-cutaneous fistula by Reda      S/P lumbar laminectomy  4/28/15 Dr. Vargas      Cloacal exstrophy  Revision of Mitrofanoff 2015      H/O endoscopy      Tethered cord  Subsequent repair with cerebral spinal fluid collection repair on 2016 with Dr. Vargas      H/O foot surgery  left 2017        [  ] No significant past history as reviewed with the patient and family    FAMILY HISTORY:    [  ] Family history not pertinent as reviewed with the patient and family    SOCIAL HISTORY:  Vaccination Status:     MEDICATIONS  (STANDING):  acetaminophen   IV Intermittent - Peds. 725 milliGRAM(s) IV Intermittent Once  ondansetron IV Intermittent - Peds 4 milliGRAM(s) IV Intermittent Once    MEDICATIONS  (PRN):    Allergies    [This allergen will not trigger allergy alert] Dermabond (Hives)  fluoroquinolone antibiotics (Unknown)  ferric carboxymaltose (Rash (Mild to Mod); Urticaria (Mild to Mod))  nitroimidazole amebicides (Swelling)  latex (Unknown)  Cipro (Hives; Rash)  Flagyl (Hives)    Intolerances        Vital Signs Last 24 Hrs  T(C): 36.4 (:16), Max: 36.4 (:16)  T(F): 97.5 (:16), Max: 97.5 (:16)  HR: 100 (:) (100 - 100)  BP: 105/70 (:16) (105/70 - 105/70)  BP(mean): --  RR: 20 (:16) (20 - 20)  SpO2: 100% (:) (100% - 100%)    Parameters below as of :16  Patient On (Oxygen Delivery Method): room air        PHYSICAL EXAM:  GENERAL: NAD, well-groomed, well-developed  HEENT - NC/AT, pupils equal and reactive to light,  ; Moist mucous membranes, Good dentition, No lesions  NECK: Supple, No JVD  CHEST/LUNG: Clear to auscultation bilaterally; No rales, rhonchi, wheezing  HEART: Regular rate and rhythm; No murmurs, rubs, or gallops  ABDOMEN: Soft, Nontender, Nondistended; Bowel sounds present  EXTREMITIES:  2+ Peripheral Pulses, No clubbing, cyanosis, or edema  NEURO:  No Focal deficits, sensory and motor intact  SKIN: No rashes or lesions                  IMAGING STUDIES:    NPO: [ ] Yes  [ ] No  Reason for NPO: [ ] OR/Procedure  [ ] Imaging with sedation  [ ] Medical Necessity  [ ] Other _____  RN Informed: [ ] Yes  [ ] No  Family informed and educated: [ ] Yes, at  25 @ 02:52 [ ] No, because ______    ASSESSMENT:    PLAN:   PEDIATRIC GENERAL SURGERY CONSULT NOTE    SIMON AKINS  |  [id number]   |   19yFemale   |   .[facility]      Patient is a 19y old  Female who presents with a chief complaint of abd pain    HPI:  Simon is a 20yo female with a history of cloacal exstrophy, bladder fistula, s/p tethered cord release, bladder reconstruction, and neovagina with Mitrofanoff and colostomy presents to ED with subacute on chronic abd pain, mainly to R side/ RUQ. She was recently seen in the office for similar symptoms and a recent CT was not concerning for bowel obstruction and did not show gallstones. A colonoscopy demonstrated signs of active colitis, which she has been following with GI for. Her ostomy output is managed with Imodium BID, mesalamine, and pectin, but per pt ostomy output has been higher than usual and consistency a little more thin/yellow/mucoid. Pt also reports associated nausea and decreased PO intake, no vomiting, no fever, no other acute complaints at this time.       PRENATAL/BIRTH HISTORY:  [  ] Term   [  ] Pre-term   Gest Age (wks):	               Apgars:                    Birth Wt:  [  ] Spontaneous Vaginal Delivery	              [  ]     reason:    PAST MEDICAL & SURGICAL HISTORY:  Congenital Imperforate Anus      Tethered Cord  spinal leakage with surgery 2016      Cloacal Exstrophy      Colostomy in place      Neurogenic bladder      Urinary leakage      Iron deficiency anemia, unspecified iron deficiency anemia type      Gastroesophageal reflux disease, esophagitis presence not specified      Headache      Viral meningitis  May 2016      Back pain      Gastrointestinal bleeding      Cavus deformity of foot      Other specified congenital malformations of spinal cord      H/O dislocation of hip  Followed by Orthopedist in [place]      Spondylosis of lumbar spine      S/P Ileostomy      S/P Colostomy  S/P revision of colostomy on 2021- pt had loop in her bowel      Tethered cord  repaired x3 thus far. Last being 2012., laminectomy 2016      S/P urinary bladder replacement  Mitrofanoff 2011      Cloacal extrophy of urinary bladder  clitoroplasty, umbilicoplasty, labioplasty, retrograde ureterogram by Chrissy.  closure off cloacal/bladder extrophy, placement of open ureteral stent by Gitlin      Pelvic deformity  Adjustment of external fixator, with removal of iliac wing pins (2), bilateral irrigation and debridement of open wounds around pins in anterior portion of pelvis, bilateral by Kylee.  Leg length discrepancy surgery 2018  EUA by Gitlin      Pelvic deformity  Removal of pelvic external fixator      Bladder stone  Removal of bladder stone by Gitlin      Cloacal exstrophy  evaluation of fallopian tubes(chromotubation) pelvic reconstruction by Rudy Hallacal exstrophy  Right jugular central venous catheter, cystourethroscopy, stone extraction, laparotomy, lysis of adhesions, takedown of colostomy, creation of Walker,, creation of neovagina with small bowel, anastomosis of neovagina to bilateral hemicervical cuff by Meredith Hall  S/P pull-through for complex cloacal extrophy, EUA and placement of wound VAC by Kat      Wound  EUA and VAC dressing change  3/17/08, 3/20/08, 3/23/08      Imperforate anus  reconstruction of perineal body, closure of posterior sagittal wound by Kat      Imperforate anus  Cystovaginoscopy, redo PSARP      Tethered cord  L4-5, S1, 2 and 3 laminectomy for detethering of spinal cord and L4-5 S1, 2 and 3 laminectomy for 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  Toe flexor lengthening, third threough 5th by Kylee      Cloacal exstnicky  EUA, cystoscopy, vaginoscopy, cystogram and removal of suture by Gitlin      Imperforate anus  PSARP by Kat Boothe exstnicky  Ileostomy takedown, pulled through segment of colon out of pelvis and created end colostomy by Kat  30 cm segment of small bowel for bladder augment by Reda and Gitlin      Wound  EUA, VAC placement for abdominal wounds by Kat Boothe exstnicky  cystoscopy of Mitrofanoff channel and EUA by Gitlin      Nevus  excision of nevus of right thigh/buttock crease, revision of colostomy, cystoscopy and cystogram by Kat      Tethered cord  Lumbosacral laminectomy, L4-5 and S1, for detethering of joshua cord by Sam      Stenosis of urinary meatus  endoscopy of Mitrofanoff by David      Bladder fistula  Resection and repair by Victorina      Cloacal exstrophy  Revision of Mitrofanoff, abdominal exploration and lysis of adhesions, retroperitoneal exploration, closure of vesico-cutaneous fistula by Reda      S/P lumbar laminectomy  4/28/15 Dr. Sam Cespedesl exstrophy  Revision of Mitrofanoff 2015      H/O endoscopy      Tethered cord  Subsequent repair with cerebral spinal fluid collection repair on 2016 with Dr. Vargas      H/O foot surgery  left 2017        [  ] No significant past history as reviewed with the patient and family    FAMILY HISTORY:    [  ] Family history not pertinent as reviewed with the patient and family    SOCIAL HISTORY:  Vaccination Status:     MEDICATIONS  (STANDING):  acetaminophen   IV Intermittent - Peds. 725 milliGRAM(s) IV Intermittent Once  ondansetron IV Intermittent - Peds 4 milliGRAM(s) IV Intermittent Once    MEDICATIONS  (PRN):    Allergies    [This allergen will not trigger allergy alert] Dermabond (Hives)  fluoroquinolone antibiotics (Unknown)  ferric carboxymaltose (Rash (Mild to Mod); Urticaria (Mild to Mod))  nitroimidazole amebicides (Swelling)  latex (Unknown)  Cipro (Hives; Rash)  Flagyl (Hives)    Intolerances        Vital Signs Last 24 Hrs  T(C): 36.4 (2025 01:16), Max: 36.4 (2025 01:16)  T(F): 97.5 (:16), Max: 97.5 (2025 01:16)  HR: 100 (2025 01:16) (100 - 100)  BP: 105/70 (2025 01:16) (105/70 - 105/70)  BP(mean): --  RR: 20 (2025 01:16) (20 - 20)  SpO2: 100% (:16) (100% - 100%)    Parameters below as of 2025 01:16  Patient On (Oxygen Delivery Method): room air        PHYSICAL EXAM:  GENERAL: NAD, well-groomed, well-developed  HEENT - NC/AT, pupils equal and reactive to light,  ; Moist mucous membranes, Good dentition, No lesions  NECK: Supple, No JVD  CHEST/LUNG: Clear to auscultation bilaterally; No rales, rhonchi, wheezing  HEART: Regular rate and rhythm; No murmurs, rubs, or gallops  ABDOMEN: Soft, Nontender, Nondistended; Bowel sounds present  EXTREMITIES:  2+ Peripheral Pulses, No clubbing, cyanosis, or edema  NEURO:  No Focal deficits, sensory and motor intact  SKIN: No rashes or lesions                  IMAGING STUDIES:    NPO: [ ] Yes  [ ] No  Reason for NPO: [ ] OR/Procedure  [ ] Imaging with sedation  [ ] Medical Necessity  [ ] Other _____  RN Informed: [ ] Yes  [ ] No  Family informed and educated: [ ] Yes, at  25 @ 02:52 [ ] No, because ______    ASSESSMENT:    PLAN:

## 2025-06-20 NOTE — H&P PEDIATRIC - NSHPREVIEWOFSYSTEMS_GEN_ALL_CORE
ROS:   General: No fevers. No decreased activity. No decreased oral intake. No decreased urine output. No history of similar illness. No sick contacts. No international travel.  HEENT: No eye redness. No eye yellowness. No congestion.  Cardiovascular: No swelling.  Pulmonary: No cough. No difficulty breathing.  Gastrointestinal: No vomiting. No diarrhea.  Genitourinary: No hematuria.  Endocrine: No polyuria.  Hematologic: No atraumatic bleeding or bruising.  Dermatologic: No rashes.  Orthopedic: No limp. No trauma.  Neurologic: No loss of consciousness. No abnormal movements.

## 2025-06-20 NOTE — DISCHARGE NOTE PROVIDER - CARE PROVIDER_API CALL
Cammy Valle  Pediatrics  [address] [facility], [address] [place] [id number]  Phone: [phone]  Fax: [phone]  Established Patient  Follow Up Time: 1-3 days

## 2025-06-20 NOTE — DISCHARGE NOTE PROVIDER - NSDCFUADDAPPT_GEN_ALL_CORE_FT
APPTS ARE READY TO BE MADE: [ ] YES    Best Family or Patient Contact (if needed):    Additional Information about above appointments (if needed):    1:   2:   3:     Other comments or requests:  APPTS ARE READY TO BE MADE: [x] YES    Best Family or Patient Contact (if needed):    Additional Information about above appointments (if needed):    1:   2:   3:     Other comments or requests:

## 2025-06-20 NOTE — H&P PEDIATRIC - NSHPLABSRESULTS_GEN_ALL_CORE
Laboratory Studies:     @ 05:51    132[L] [135 - 145]      |  100 [98 - 107]      |  34[H] [7 - 23]  -----------------------------------------------------------------<  95 [70 - 99]  3.9 [3.5 - 5.3]         |  10[LL] [22 - 31]    |  0.88 [0.50 - 1.30]    Ca    10.4 [8.4 - 10.5]       @ 05:51    TPro  9.3[H] [6.0 - 8.3]  /  Alb  5.5[H] [3.3 - 5.0]  /  TBili  0.9 [0.2 - 1.2]  /  DBili  x   /  AST  21 [4 - 32]  /  ALT  19 [4 - 33]  /  AlkPhos  125[H] [40 - 120]  2025 05:51                            14.7   10.26 )-----------( 474      ( 2025 05:51 )             43.1   Bax     N71.1  L20.0  M7.0   E0.2      PT/INR - (  @ 05:51 )   PT: 12.7 sec;   INR: 1.07 ratio    PTT - (  @ 05:51 )  PTT:29.8 sec    25 @ 05:46 Urinalysis:  - Color: Yellow  - Appearance: Cloudy[!]  - pH: --  - S.016 [1.005 - 1.030]  - Protein: 300[!]  - Glucose: Negative  - Ketones: --  - Urobilinogen: 0.2 [0.2 - 1.0]  - Bilirubin: Negative  - Blood: Trace[!]  - Crystals: --  - WBC: 26[H] [0 - 5]  - RBC: 1 [0 - 4]  - Casts: --  - Nitrite: Negative  - Esterase: Small[!]  - Bacteria: Occasional[!]    Imaging Studies:    < from: Xray Abdomen 2 Views (25 @ 07:46) >    FINDINGS:  Nonobstructive bowel gas pattern.  No evidence of intraperitoneal free air.  No acute osseus pathology. Thoracolumbar spinal fusion hardware.   Sacrococcygeal and iliac wing dysplasia.    IMPRESSION:  Nonobstructive bowel gas pattern.    < end of copied text >    < from: US Abdomen Upper Quadrant Right (25 @ 03:30) >    FINDINGS:  Liver: Within normal limits.  Bile ducts: Normal caliber. Common bile duct measures 2 mm.  Gallbladder: Within normal limits. No stones or gallbladder wall   thickening. No sonographic Davey's sign.  Pancreas: Poorly visualized.  Right kidney: 9.5 cm. No hydronephrosis.  Ascites: None.  IVC: Visualized portions are within normal limits.    IMPRESSION:  Unremarkable right upper quadrant abdominal ultrasound.    < end of copied text >

## 2025-06-20 NOTE — H&P PEDIATRIC - ASSESSMENT
GERI AKINS is a 19y female PMH ___ who presented to [facility] ED / OSH with ___ , found to have ___ , consistent with ___ . Differential diagnosis remains broad, including ___ . Plan for admission to [facility] for ___ .  GERI AKINS is a 19-year-old female with history of cloacal exstrophy, bladder fistula, s/p tethered cord release, bladder reconstruction, and neovagina with Mitrofanoff and colostomy, multiple spinal surgeries with last spinal procedure in December with Dr. Vargas presenting to [facility] ED with acute on chronic bilateral upper abdominal pain, increased ostomy output and upper back pain          found to have ___ , consistent with ___ . Differential diagnosis remains broad, including ___ . Plan for admission to [facility] for ___ .  GERI AKINS is a 19-year-old female with history of cloacal exstrophy, bladder fistula, s/p tethered cord release, bladder reconstruction, and neovagina with Mitrofanoff and colostomy, multiple spinal surgeries with last spinal procedure in December with Dr. Vargas presenting to [facility] ED with acute on chronic bilateral upper abdominal pain, increased ostomy output and upper back pain found to have BUN of 10 and pyuria, consistent with dehydration secondary to diarrhea and UTI pending cultures. Differential remains broad, diarrhea could be secondary to infection vs inflammation, unclear if diarrhea osmotic vs secretory. Plan for admission to [facility] for MRE and rehydration.     #Ostomy increase, abd pain   - NPO (secretory vs osmotic diarrhea)  - MRE   - Prior CT inc heterogeneity in liver; c/f IBD?  - f/u celiac labs   - Continue Loperamide 16mg TID  - PMNR consulted, awaiting recs    #Pyuria, r/out UTI   - Pending urine cultures     #FENGI  - NPO

## 2025-06-20 NOTE — CONSULT NOTE PEDS - ASSESSMENT
19-year-old female with cloacal exstrophy, bladder fistula, tethered cord and imperforate anus s/p numerous surgical interventions including bladder reconstruction, neovagina, Mitrofanoff creation, colostomy creation with previous diverting ileostomy, s/p bowel resection of strictured bowel  and stomal revision in 2021 and numerous tethered cord releases here with abdominal pain and elevated ostomy output. Differential of elevated output includes infection vs. inflammatory vs. obstructive. Colonoscopy through the stoma in April with active colitis in the distal colon and CT abdomen with IV contrast on 6/16 which showed thickening of the bowel wall in the ileum, not present on CT in March, which may represent inflammatory disease of the bowel. To better characterize, will obtain MRE of the abdomen. Will also consider endoscopic reevaluation.    Recommend:  - Keep NPO on IV fluids  - Send C diff from stomal output  - MRE of abdomen and pelvis  - Given strong family history of celiac disease, please send TTG IgA and quant IgA  - Continue home loperamide 16 mg TID  - Hold home mesalamine

## 2025-06-20 NOTE — H&P PEDIATRIC - ATTENDING COMMENTS
Attending attestation:   Patient seen and examined at approximately 12:30 PM on June 20th    I have reviewed the History, Physical Exam, Assessment and Plan as written by the above resident. PMH, PSH, FH, and SH reviewed.     Physical exam:  Gen: no apparent distress, appears comfortable  HEENT: normocephalic/atraumatic, moist mucous membranes, pupils equal round and reactive, extraocular movements intact, clear conjunctiva  Neck: supple  Heart: S1S2+, regular rate and rhythm, no murmur, cap refill < 2 sec, 2+ peripheral pulses  Lungs: normal respiratory pattern, clear to auscultation bilaterally  Abd: soft, nontender, nondistended, bowel sounds present, +Colostomy bag in place on left side with yellow tinged output  Ext: full range of motion, no edema, no tenderness  Neuro: no focal deficits, awake, alert, no acute change from baseline exam  Skin: no rash, intact and not indurated      A/P: Simon is a 19-year-old female with history of cloacal exstrophy, bladder fistula, s/p tethered cord release, bladder reconstruction, and neovagina with Mitrofanoff and colostomy, multiple spinal surgeries with last spinal procedure in December with Dr. Vargas presenting to [facility] [place] ED with acute on chronic bilateral upper abdominal pain & upper back pain, increased ostomy output over months, admitted for dehydration, pain control and further workup. Seen in the Emergency Department by surgery and neurosurgery- no surgical recommendations. GI consulted- recommend MRE & celiac labs. Patient is tolerating PO now, but per GI recommendations, made NPO to distinguish between secretory vs osmotic causes of increased output. Currently her pain is present but improved from prior- on presentation 9/10, now 7/10. No concerns for acute abdomen. GI PCR in process- follow up. UCX in process- follow up. Given chronic aspect of pain, PMR consulted, awaiting recs.     --    High risk:  [X] 1 or more chronic illnesses with exacerbation, progression or side effects of treatment  [ ] 1 acute or chronic illness or injury that poses a threat to life or bodily function    [X] I reviewed prior external notes  [X] I reviewed test results  [ ] I ordered test  [X] I interpreted lab/ imaging   [X] I discussed management or test interpretation with the following physicians: GI    [ ] drug therapy requiring intensive monitoring for toxicity  [ ] decision regarding hospitalization or escalation of hospital-level care  [ ] decision to be DNR or to de-escalate because of poor prognosis      Family Centered Rounds completed with: patient/ Mom, bedside/charge RN, and pediatric residents.    Tracy Armas MD  Pediatric [facility] Medicine

## 2025-06-20 NOTE — DISCHARGE NOTE PROVIDER - NSDCCPCAREPLAN_GEN_ALL_CORE_FT
PRINCIPAL DISCHARGE DIAGNOSIS  Diagnosis: Dehydration  Assessment and Plan of Treatment: Contact a health care provider if your child:  Has any symptoms of mild dehydration that do not go away after 2 days.  Has any symptoms of moderate dehydration that do not go away after 24 hours.  Has a fever.  Get help right away if your child:  Has symptoms of severe dehydration.  Has symptoms that suddenly get worse or get worse with treatment.  Vomits every time they eat. Vomiting may:  Come and go.  Be forceful (projectile).  Include green matter (bile) or blood.  Has diarrhea that is severe or lasts for more than 48 hours.  Has blood in their stool (feces). Stool may look black and tarry.  Passes no urine, or only a small amount of very dark urine in 6–8 hours.  Is younger than 3 months and has a temperature of 100.4°F (38°C) or higher.  Is 3 months to 3 years old and has a temperature of 102.2°F (39°C) or higher.     PRINCIPAL DISCHARGE DIAGNOSIS  Diagnosis: Dehydration  Assessment and Plan of Treatment: Please follow-up with Júnioran in 1 week to repeat BMP to confirm resolution of metabolic acidosis  If patient DOES NOT pass the pill by 1030am 6/23, please get an outpatient xray  Contact a health care provider if your child:  Has any symptoms of mild dehydration that do not go away after 2 days.  Has any symptoms of moderate dehydration that do not go away after 24 hours.  Has a fever.  Get help right away if your child:  Has symptoms of severe dehydration.  Has symptoms that suddenly get worse or get worse with treatment.  Vomits every time they eat. Vomiting may:  Come and go.  Be forceful (projectile).  Include green matter (bile) or blood.  Has diarrhea that is severe or lasts for more than 48 hours.  Has blood in their stool (feces). Stool may look black and tarry.  Passes no urine, or only a small amount of very dark urine in 6–8 hours.  Is younger than 3 months and has a temperature of 100.4°F (38°C) or higher.  Is 3 months to 3 years old and has a temperature of 102.2°F (39°C) or higher.

## 2025-06-20 NOTE — CONSULT NOTE PEDS - SUBJECTIVE AND OBJECTIVE BOX
Patient is a 19y old  Female who presents with a chief complaint of dehydration (20 Jun 2025 14:55)    HPI:  CC: Patient is a 19y old  Female who presents with a chief complaint of  abdominal pain .  HPI: GERI AKINS is a 19-year-old female with history of cloacal exstrophy, bladder fistula, s/p tethered cord release, bladder reconstruction, and neovagina with Mitrofanoff and colostomy, multiple spinal surgeries with last spinal procedure in December with Dr. Vargas presenting with acute on chronic bilateral upper abdominal pain, increased ostomy output and upper back pain.  Patient notes that she is being worked up outpatient without elucidating a cause for her pain.  She had a CAT scan on Monday that showed heterogeneity within the liver and is due for an ultrasound.  Otherwise CT was unrevealing for the cause of her pain. She endorses nausea and poor p.o. intake secondary to low appetite. She endorses increased abdominal pain x 1 week, increased ostomy output over last several months. She presented to the ED due to worsening of the pain. On presentation pain was 9/10. Now states pain is 7/10.     [facility] [place] ED she was found to be afebrile with vital signs stable, tenderness and inc ostomy output, RUQ US wnl, AXR wnl, bicarb 10,  2x NSB, D5NS @ 1x MIVF, apap, reglan, pyuria with esterase (no urinary sx mentioned). Neurosurgery, GI and surgery consulted in ED    PMHx/Sx: cloacal exstrophy, bladder fistula, s/p tethered cord release, bladder reconstruction, and neovagina with Mitrofanoff and colostomy, multiple spinal surgeries  Rx: Loperamide 2mg 6 caps 4x day PRN, lorazepam 1mg as needed for anxiety, mesalamine ER 500mg 2 caps TID, methocarbamol 500mg BID as needed, norethindrone acetate 5mg BID (same time every morning/evening), odansetron 8mg TID as needed for nausea, Xifaxan 550mg qd  FHx: Celiac disease  Imm: vUTD.  SHx: Lives in [place]. Lives with mom and maternal grandmother. Attends college. Not currently sexually active, never has been. Does not smoke, drink alcohol or use any other substances.   PMD: Dr. Cammy Valle Porum  Pharm: [facility]  Allergies: [This allergen will not trigger allergy alert] Dermabond (Hives)  fluoroquinolone antibiotics (Unknown)  ferric carboxymaltose (Rash (Mild to Mod); Urticaria (Mild to Mod))  ciprofloxacin (Unknown)  nitroimidazole amebicides (Swelling)  latex (Unknown)  Cipro (Hives; Rash)  Flagyl (Hives)  Diet: regular, avoids green leafy vegetables per outside GI     (20 Jun 2025 13:33)    PM&R got consulted   this pt has been in and out hospital so many times for dethetherd cord surgery and got multi fusion  and had tendon transfer and legnthening on lower extremities  and pt has fam several months of abdominal pain comes and goes  abd pain is located below rib both RUQ and LUQ  non radiating  it is worse with pressing   pt has had mitranoff since young age  this pt goes college    REVIEW OF SYSTEMS:   REVIEW OF SYSTEMS:         Constitutional:  Generally sleeps well at night    HEENT:  No current problems with vision, hearing, or swallow function    Cardiovascular:  No known problems    Respiratory:  No current problems    Gastrointestinal:  see HPI    Genitourinary:  neurogenic bladder and bowel  Musculoskeletal:  No back or joint pain    Skin:  No rashes or recent skin breakdown    Neurologic:  No recent changes in strength or sensation.  No functional regression.    Psychiatric:  No current problems with mood or behavior    Hematologic:  No known problems    Infectious disease:  No current problems           PAST MEDICAL & SURGICAL HISTORY  Congenital Imperforate Anus    Pelvic Deformity    Tethered Cord    Bladder Extrophy    Cloacal Exstrophy    Colostomy in place    Neurogenic bladder    Urinary leakage    Iron deficiency anemia, unspecified iron deficiency anemia type    Gastroesophageal reflux disease, esophagitis presence not specified    Headache    Viral meningitis    Back pain    Gastrointestinal bleeding    Cavus deformity of foot    Other specified congenital malformations of spinal cord    H/O dislocation of hip    Spondylosis of lumbar spine    S/P Ileostomy    S/P Colostomy    Tethered cord    S/P urinary bladder replacement    Cloacal extrophy of urinary bladder    Pelvic deformity    Pelvic deformity    Bladder stone    Cloacal exstrophy    Cloacal exstrophy    Wound    Wound    Imperforate anus    Imperforate anus    Tethered cord    Toe deformity    Cloacal exstrophy    Imperforate anus    Cloacal exstrophy    Wound    Cloacal exstrophy    Nevus    Tethered cord    Stenosis of urinary meatus    Bladder fistula    Cloacal exstrophy    S/P lumbar laminectomy    Cloacal exstrophy    H/O endoscopy    Tethered cord    H/O foot surgery      SOCIAL HISTORY     FAMILY HISTORY   No pertinent family history in first degree relatives      RECENT LABS/IMAGING  CBC Full  -  ( 20 Jun 2025 05:51 )  WBC Count : 10.26 K/uL  RBC Count : 4.65 M/uL  Hemoglobin : 14.7 g/dL  Hematocrit : 43.1 %  Platelet Count - Automated : 474 K/uL  Mean Cell Volume : 92.7 fl  Mean Cell Hemoglobin : 31.6 pg  Mean Cell Hemoglobin Concentration : 34.1 g/dL  Auto Neutrophil # : 7.30 K/uL  Auto Lymphocyte # : 2.05 K/uL  Auto Monocyte # : 0.72 K/uL  Auto Eosinophil # : 0.02 K/uL  Auto Basophil # : 0.10 K/uL  Auto Neutrophil % : 71.1 %  Auto Lymphocyte % : 20.0 %  Auto Monocyte % : 7.0 %  Auto Eosinophil % : 0.2 %  Auto Basophil % : 1.0 %    06-20    132[L]  |  100  |  34[H]  ----------------------------<  95  3.9   |  10[LL]  |  0.88    Ca    10.4      20 Jun 2025 05:51    TPro  9.3[H]  /  Alb  5.5[H]  /  TBili  0.9  /  DBili  x   /  AST  21  /  ALT  19  /  AlkPhos  125[H]  06-20    ALLERGIES  [This allergen will not trigger allergy alert] Dermabond (Hives)  fluoroquinolone antibiotics (Unknown)  ferric carboxymaltose (Rash (Mild to Mod); Urticaria (Mild to Mod))  ciprofloxacin (Unknown)  nitroimidazole amebicides (Swelling)  latex (Unknown)  Cipro (Hives; Rash)  Flagyl (Hives)    MEDICATIONS  acetaminophen   IV Intermittent - Peds. 1000 milliGRAM(s) IV Intermittent once PRN  dextrose 5% + sodium chloride 0.9%. - Pediatric 1000 milliLiter(s) IV Continuous <Continuous>  loperamide Oral Tab/Cap - Peds 16 milliGRAM(s) Oral three times a day  LORazepam  Oral Tab/Cap - Peds 1 milliGRAM(s) Oral daily PRN      VITALS  T(C): 36.2 (06-20-25 @ 18:15), Max: 37 (06-20-25 @ 04:10)  HR: 92 (06-20-25 @ 18:15) (77 - 100)  BP: 102/67 (06-20-25 @ 18:15) (96/64 - 112/79)  RR: 18 (06-20-25 @ 18:15) (18 - 20)  SpO2: 98% (06-20-25 @ 18:15) (97% - 100%)  Wt(kg): --    ----------------------------------------------------------------------------------------  PHYSICAL EXAM  PHYSICAL EXAMINATION:    General appearance - well appaering    Mental status -alert and ortietned    Respiratory - no wheezing heard    CHEST: equal expansion upon breathing in    Abdomen - mitranoff    Skin - post surgical scar on back    Neuromuscular  lordodic  cavus feet  spastic into dorsiflexion with mild catch  gait wnl  L2-s1 myotome strength 5/5 fabienne   carnette sign +ve

## 2025-06-20 NOTE — H&P PEDIATRIC - HISTORY OF PRESENT ILLNESS
CC: Patient is a 19y old  Female who presents with a chief complaint of  ___ .  HPI: GERI AKINS is a 19y female PMH ___ who presented to [facility] [place] ED / OSH for ___ .    19-year-old female with complex surgical history including colostomy placement, multiple spinal surgeries with last spinal procedure in December with Dr. Vargas presenting with acute on chronic bilateral upper abdominal pain and upper back pain.  Patient and mother note that she is being worked up outpatient without elucidating a cause for her pain.  She had a CAT scan on Monday that showed heterogeneity within the liver and is due for an ultrasound.  Otherwise CT was unrevealing for the cause of her pain.  She notes that there was some concern with a screw placed during her last spinal procedure but has not been able to follow-up with her neurosurgeon.  She denies any fever, runny nose cough, sore throat, chest pain, shortness of breath, vomiting. endorses nausea and poor p.o. intake secondary to low appetite. Took relaxers and Tylenol today without relief of her pain.  Patient she was sent in by Dr. Lara.    PMH: None  PSH: None  Rx: No routine use of medications or supplements.  FHx: No known chronic diseases of childhood in any first-degree relative.  Imm: vUTD.  BHx: Full term, ___ wga, no prenatal-- complications, no NICU stay.  SHx: Lives with ___ in ___ , [place]. Attends school / . No smokers in the home.  PMD:   Pharm: ___  Allergies: [This allergen will not trigger allergy alert] Dermabond (Hives)  fluoroquinolone antibiotics (Unknown)  ferric carboxymaltose (Rash (Mild to Mod); Urticaria (Mild to Mod))  ciprofloxacin (Unknown)  nitroimidazole amebicides (Swelling)  latex (Unknown)  Cipro (Hives; Rash)  Flagyl (Hives)  Diet: ___     CC: Patient is a 19y old  Female who presents with a chief complaint of  abdominal pain .  HPI: GERI AKINS is a 19-year-old female with history of cloacal exstrophy, bladder fistula, s/p tethered cord release, bladder reconstruction, and neovagina with Mitrofanoff and colostomy, multiple spinal surgeries with last spinal procedure in December with Dr. Vargas presenting with acute on chronic bilateral upper abdominal pain and upper back pain.  Patient notes that she is being worked up outpatient without elucidating a cause for her pain.  She had a CAT scan on Monday that showed heterogeneity within the liver and is due for an ultrasound.  Otherwise CT was unrevealing for the cause of her pain. She endorses nausea and poor p.o. intake secondary to low appetite. She presented to the ED due to worsening of the pain.     [facility] ED: In the ED she was found to be afebrile with vital signs stable, tenderness and inc ostomy output, dw surg, RUQ US wnl, AXR wnl, bicarb 10, dw nsgy ntd, gi to evaluate, 2x NSB, D5NS @ 1x MIVF, apap, reglan, pyuria with esterase (no urinary sx mentioned)    PMHx/Sx: cloacal exstrophy, bladder fistula, s/p tethered cord release, bladder reconstruction, and neovagina with Mitrofanoff and colostomy, multiple spinal surgeries  Rx: Loperamide 2mg 6 caps 4x day PRN, lorazepam 1mg as needed for anxiety, mesalamine ER 500mg 2 caps TID, methocarbamol 500mg BID as needed, norethindrone acetate 5mg BID (same time every morning/evening), odansetron 8mg TID as needed for nausea, Xifaxan 550mg qd  FHx: Celiac disease  Imm: vUTD.  SHx: Lives in [place]. Lives with mom and maternal grandmother. Attends college. Not currently sexually active, never has been. Does not smoke, drink alcohol or use any other substances.   PMD: Ema Mitchell  [facility]: [facility]  Allergies: [This allergen will not trigger allergy alert] Dermabond (Hives)  fluoroquinolone antibiotics (Unknown)  ferric carboxymaltose (Rash (Mild to Mod); Urticaria (Mild to Mod))  ciprofloxacin (Unknown)  nitroimidazole amebicides (Swelling)  latex (Unknown)  Cipro (Hives; Rash)  Flagyl (Hives)  Diet: regular, avoids green leafy vegetables per outside GI     CC: Patient is a 19y old  Female who presents with a chief complaint of  abdominal pain .  HPI: GERI AKINS is a 19-year-old female with history of cloacal exstrophy, bladder fistula, s/p tethered cord release, bladder reconstruction, and neovagina with Mitrofanoff and colostomy, multiple spinal surgeries with last spinal procedure in December with Dr. Vargas presenting with acute on chronic bilateral upper abdominal pain, increased ostomy output and upper back pain.  Patient notes that she is being worked up outpatient without elucidating a cause for her pain.  She had a CAT scan on Monday that showed heterogeneity within the liver and is due for an ultrasound.  Otherwise CT was unrevealing for the cause of her pain. She endorses nausea and poor p.o. intake secondary to low appetite. She endorses increased abdominal pain x 1 week, increased ostomy output over last several months. She presented to the ED due to worsening of the pain. On presentation pain was 9/10. Now states pain is 7/10.     [facility] [place] ED she was found to be afebrile with vital signs stable, tenderness and inc ostomy output, RUQ US wnl, AXR wnl, bicarb 10,  2x NSB, D5NS @ 1x MIVF, apap, reglan, pyuria with esterase (no urinary sx mentioned). Neurosurgery, GI and surgery consulted in ED    PMHx/Sx: cloacal exstrophy, bladder fistula, s/p tethered cord release, bladder reconstruction, and neovagina with Mitrofanoff and colostomy, multiple spinal surgeries  Rx: Loperamide 2mg 6 caps 4x day PRN, lorazepam 1mg as needed for anxiety, mesalamine ER 500mg 2 caps TID, methocarbamol 500mg BID as needed, norethindrone acetate 5mg BID (same time every morning/evening), odansetron 8mg TID as needed for nausea, Xifaxan 550mg qd  FHx: Celiac disease  Imm: vUTD.  SHx: Lives in [place]. Lives with mom and maternal grandmother. Attends college. Not currently sexually active, never has been. Does not smoke, drink alcohol or use any other substances.   PMD: Ema Mitchell  [facility]: [facility]  Allergies: [This allergen will not trigger allergy alert] Dermabond (Hives)  fluoroquinolone antibiotics (Unknown)  ferric carboxymaltose (Rash (Mild to Mod); Urticaria (Mild to Mod))  ciprofloxacin (Unknown)  nitroimidazole amebicides (Swelling)  latex (Unknown)  Cipro (Hives; Rash)  Flagyl (Hives)  Diet: regular, avoids green leafy vegetables per outside GI

## 2025-06-20 NOTE — DISCHARGE NOTE PROVIDER - NSDCMRMEDTOKEN_GEN_ALL_CORE_FT
cholecalciferol: 5,000 international unit(s) once a week  loperamide 2 mg oral capsule: 8 cap(s) orally 3 times a day  LORazepam 1 mg oral tablet: 1 tab(s) orally once a day as needed for  anxiety  methocarbamol 500 mg oral tablet: 1 tab(s) orally 2 times a day as needed for  constipation  norethindrone acetate 5 mg oral tablet: 1 tab(s) orally 2 times a day  Pentasa 500 mg oral capsule, extended release: 2 cap(s) orally 3 times a day  Xifaxan 550 mg oral tablet: 1 tab(s) orally once a day   cholecalciferol: 50,000 international unit(s) once a week  loperamide 2 mg oral capsule: 8 cap(s) orally 3 times a day  LORazepam 1 mg oral tablet: 1 tab(s) orally once a day as needed for  anxiety  methocarbamol 500 mg oral tablet: 1 tab(s) orally 2 times a day as needed for  constipation  Pentasa 500 mg oral capsule, extended release: 2 cap(s) orally 3 times a day  Xifaxan 550 mg oral tablet: 1 tab(s) orally once a day   amitriptyline 25 mg oral tablet: 1 tab(s) orally 2 times a day  amoxicillin 500 mg oral tablet: 1 tab(s) orally every 8 hours  cholecalciferol: 50,000 international unit(s) once a week  loperamide 2 mg oral capsule: 8 cap(s) orally 3 times a day  LORazepam 1 mg oral tablet: 1 tab(s) orally once a day as needed for  anxiety  methocarbamol 500 mg oral tablet: 1 tab(s) orally 2 times a day as needed for  constipation  Pentasa 500 mg oral capsule, extended release: 2 cap(s) orally 3 times a day  Xifaxan 550 mg oral tablet: 1 tab(s) orally once a day

## 2025-06-20 NOTE — ED PROVIDER NOTE - ATTENDING CONTRIBUTION TO CARE
MD julian  I personally performed a history and physical examination, and discussed the management with the resident.   Pertinent portions were confirmed with the patient and/or family.  I made modifications above as appropriate; I concur with the history as documented above unless otherwise noted.  I reviewed  lab work and imaging, if obtained .  I reviewed and agree with the assessment and plan as documented. the family/caregiver was informed throughout evaluation.

## 2025-06-21 LAB
ANION GAP SERPL CALC-SCNC: 14 MMOL/L — SIGNIFICANT CHANGE UP (ref 7–14)
BUN SERPL-MCNC: 20 MG/DL — SIGNIFICANT CHANGE UP (ref 7–23)
CALCIUM SERPL-MCNC: 9.1 MG/DL — SIGNIFICANT CHANGE UP (ref 8.4–10.5)
CHLORIDE SERPL-SCNC: 111 MMOL/L — HIGH (ref 98–107)
CO2 SERPL-SCNC: 11 MMOL/L — LOW (ref 22–31)
CREAT SERPL-MCNC: 0.63 MG/DL — SIGNIFICANT CHANGE UP (ref 0.5–1.3)
CRP SERPL-MCNC: <3 MG/L — SIGNIFICANT CHANGE UP
EGFR: 130 ML/MIN/1.73M2 — SIGNIFICANT CHANGE UP
EGFR: 130 ML/MIN/1.73M2 — SIGNIFICANT CHANGE UP
GLIADIN PEPTIDE IGA SER-ACNC: 0.5 U/ML — SIGNIFICANT CHANGE UP
GLIADIN PEPTIDE IGA SER-ACNC: NEGATIVE — SIGNIFICANT CHANGE UP
GLIADIN PEPTIDE IGG SER-ACNC: 1 U/ML — SIGNIFICANT CHANGE UP
GLIADIN PEPTIDE IGG SER-ACNC: NEGATIVE — SIGNIFICANT CHANGE UP
GLUCOSE SERPL-MCNC: 93 MG/DL — SIGNIFICANT CHANGE UP (ref 70–99)
MAGNESIUM SERPL-MCNC: 2.2 MG/DL — SIGNIFICANT CHANGE UP (ref 1.6–2.6)
PHOSPHATE SERPL-MCNC: 3.8 MG/DL — SIGNIFICANT CHANGE UP (ref 2.5–4.5)
POTASSIUM SERPL-MCNC: 3.9 MMOL/L — SIGNIFICANT CHANGE UP (ref 3.5–5.3)
POTASSIUM SERPL-SCNC: 3.9 MMOL/L — SIGNIFICANT CHANGE UP (ref 3.5–5.3)
SODIUM SERPL-SCNC: 136 MMOL/L — SIGNIFICANT CHANGE UP (ref 135–145)
TTG IGA SER-ACNC: <0.5 U/ML — SIGNIFICANT CHANGE UP
TTG IGA SER-ACNC: NEGATIVE — SIGNIFICANT CHANGE UP
TTG IGG SER IA-ACNC: NEGATIVE — SIGNIFICANT CHANGE UP
TTG IGG SER-ACNC: 1.3 U/ML — SIGNIFICANT CHANGE UP

## 2025-06-21 PROCEDURE — 99233 SBSQ HOSP IP/OBS HIGH 50: CPT | Mod: GC

## 2025-06-21 PROCEDURE — 99232 SBSQ HOSP IP/OBS MODERATE 35: CPT

## 2025-06-21 RX ORDER — ACETAMINOPHEN 500 MG/5ML
650 LIQUID (ML) ORAL EVERY 6 HOURS
Refills: 0 | Status: DISCONTINUED | OUTPATIENT
Start: 2025-06-21 | End: 2025-06-22

## 2025-06-21 RX ORDER — POTASSIUM CHLORIDE, DEXTROSE MONOHYDRATE AND SODIUM CHLORIDE 150; 5; 900 MG/100ML; G/100ML; MG/100ML
1000 INJECTION, SOLUTION INTRAVENOUS
Refills: 0 | Status: DISCONTINUED | OUTPATIENT
Start: 2025-06-21 | End: 2025-06-22

## 2025-06-21 RX ADMIN — Medication 650 MILLIGRAM(S): at 06:46

## 2025-06-21 RX ADMIN — Medication 50000 UNIT(S): at 09:52

## 2025-06-21 RX ADMIN — POTASSIUM CHLORIDE, DEXTROSE MONOHYDRATE AND SODIUM CHLORIDE 100 MILLILITER(S): 150; 5; 900 INJECTION, SOLUTION INTRAVENOUS at 06:47

## 2025-06-21 RX ADMIN — LOPERAMIDE HCL 16 MILLIGRAM(S): 1 SOLUTION ORAL at 09:53

## 2025-06-21 RX ADMIN — POTASSIUM CHLORIDE, DEXTROSE MONOHYDRATE AND SODIUM CHLORIDE 100 MILLILITER(S): 150; 5; 900 INJECTION, SOLUTION INTRAVENOUS at 11:01

## 2025-06-21 RX ADMIN — LOPERAMIDE HCL 16 MILLIGRAM(S): 1 SOLUTION ORAL at 19:58

## 2025-06-21 RX ADMIN — POTASSIUM CHLORIDE, DEXTROSE MONOHYDRATE AND SODIUM CHLORIDE 100 MILLILITER(S): 150; 5; 900 INJECTION, SOLUTION INTRAVENOUS at 19:58

## 2025-06-21 RX ADMIN — POTASSIUM CHLORIDE, DEXTROSE MONOHYDRATE AND SODIUM CHLORIDE 100 MILLILITER(S): 150; 5; 900 INJECTION, SOLUTION INTRAVENOUS at 19:25

## 2025-06-21 RX ADMIN — LOPERAMIDE HCL 16 MILLIGRAM(S): 1 SOLUTION ORAL at 15:00

## 2025-06-21 RX ADMIN — Medication 2000 MILLILITER(S): at 09:54

## 2025-06-21 RX ADMIN — POTASSIUM CHLORIDE, DEXTROSE MONOHYDRATE AND SODIUM CHLORIDE 100 MILLILITER(S): 150; 5; 900 INJECTION, SOLUTION INTRAVENOUS at 07:35

## 2025-06-21 NOTE — PROGRESS NOTE PEDS - ASSESSMENT
19-year-old female with cloacal exstrophy, bladder fistula, tethered cord and imperforate anus s/p numerous surgical interventions including bladder reconstruction, neovagina, Mitrofanoff creation, colostomy creation with previous diverting ileostomy, s/p bowel resection of strictured bowel  and stomal revision in 2021 and numerous tethered cord releases here with abdominal pain and elevated ostomy output. Differential of elevated output includes infection vs. inflammatory vs. obstructive. Colonoscopy through the stoma in April with active colitis in the distal colon and CT abdomen with IV contrast on 6/16 which showed thickening of the bowel wall in the ileum, not present on CT in March, which may represent inflammatory disease of the bowel. To better characterize, MRE obtained yesterday. Will also consider endoscopic reevaluation.    Recommend:  - Keep NPO on IV fluids  - Send C diff from stomal output  - Given strong family history of celiac disease, please send TTG IgA and quant IgA  - Continue home loperamide 16 mg TID  - Hold home mesalamine 19-year-old female with cloacal exstrophy, bladder fistula, tethered cord and imperforate anus s/p numerous surgical interventions including bladder reconstruction, neovagina, Mitrofanoff creation, colostomy creation with previous diverting ileostomy, s/p bowel resection of strictured bowel  and stomal revision in 2021 and numerous tethered cord releases here with abdominal pain and elevated ostomy output. Given output has significantly improved while NPO, elevated output seems to be osmotic rather than secretory. Differential of elevated output includes infection, however unlikely given it has been occurring for months, vs. inflammatory vs. obstructive. Colonoscopy through the stoma in April with active colitis in the distal colon and CT abdomen with IV contrast on 6/16 which showed thickening of the bowel wall in the ileum, not present on CT in March, which may represent inflammatory disease of the bowel. To better characterize, MRE obtained yesterday. Repeat labs this AM consistent with continued dehydration with elevated chloride and bicarb of 11.     Recommend:  - Keep NPO on IV fluids, continue to resuscitate with additional fluids given continued dehydration   - Send C diff from stomal output  - Given strong family history of celiac disease, please send TTG IgA and quant IgA  - Continue home loperamide 16 mg TID  - Hold home mesalamine  - F/u MRE read

## 2025-06-21 NOTE — PROGRESS NOTE PEDS - SUBJECTIVE AND OBJECTIVE BOX
Interval History: Continues to remain NPO on IV fluids. Continues on home loperamide. MRE done yesterday evening.     MEDICATIONS  (STANDING):  cholecalciferol Oral Tab/Cap - Peds 34674 Unit(s) Oral every week  dextrose 5% + sodium chloride 0.9%. - Pediatric 1000 milliLiter(s) (90 mL/Hr) IV Continuous <Continuous>  loperamide Oral Tab/Cap - Peds 16 milliGRAM(s) Oral three times a day    MEDICATIONS  (PRN):  acetaminophen   Oral Tab/Cap - Peds. 650 milliGRAM(s) Oral every 6 hours PRN Mild Pain (1 - 3), Moderate Pain (4 - 6), Severe Pain (7 - 10)  LORazepam  Oral Tab/Cap - Peds 1 milliGRAM(s) Oral daily PRN for anxiety      Daily     Daily Weight in k.3 (2025 17:25)  BMI: 23.7 ( @ 17:25)  Change in Weight:  Vital Signs Last 24 Hrs  T(C): 36.4 (2025 05:45), Max: 36.8 (2025 14:28)  T(F): 97.5 (2025 05:45), Max: 98.2 (2025 14:28)  HR: 82 (2025 05:45) (82 - 92)  BP: 100/68 (2025 05:45) (96/64 - 109/71)  BP(mean): --  RR: 18 (2025 05:45) (18 - 20)  SpO2: 98% (2025 05:45) (96% - 100%)    Parameters below as of 2025 05:45  Patient On (Oxygen Delivery Method): room air      I&O's Detail    2025 07:01  -  2025 06:37  --------------------------------------------------------  IN:    dextrose 5% + sodium chloride 0.9% - Pediatric: 1890 mL  Total IN: 1890 mL    OUT:  Total OUT: 0 mL    Total NET: 1890 mL          PHYSICAL EXAM  General:  Well developed, alert and active, no pallor, NAD.  HEENT:    Normal appearance of conjunctiva, ears, nose, lips, oral mucosa, and oropharynx, anicteric.  Respiratory:  normal respiratory effort.   Abdominal:   soft, no masses, non-tender without distension, colostomy pink with thin output in stomal bag, ostomy bag covering Mitrofanoff   Skin:   No rash, jaundice, lesions, or eczema.   Musculoskeletal:  No joint swelling, erythema or tenderness.   Neuro: No focal deficits.   Other:       Lab Results:                        14.7   10.26 )-----------( 474      ( 2025 05:51 )             43.1     06-20    132[L]  |  100  |  34[H]  ----------------------------<  95  3.9   |  10[LL]  |  0.88    Ca    10.4      2025 05:51    TPro  9.3[H]  /  Alb  5.5[H]  /  TBili  0.9  /  DBili  x   /  AST  21  /  ALT  19  /  AlkPhos  125[H]  06-20    LIVER FUNCTIONS - ( 2025 05:51 )  Alb: 5.5 g/dL / Pro: 9.3 g/dL / ALK PHOS: 125 U/L / ALT: 19 U/L / AST: 21 U/L / GGT: x           PT/INR - ( 2025 05:51 )   PT: 12.7 sec;   INR: 1.07 ratio         PTT - ( 2025 05:51 )  PTT:29.8 sec      Stool Results:          RADIOLOGY RESULTS:    SURGICAL PATHOLOGY:    Interval History: Continues to remain NPO on IV fluids. No output overnight, bag empty this morning and changed last night. Continues on home loperamide. MRE done yesterday evening, awaiting final read. Repeat labs this morning with chloride elevated to 111 and bicarb 11. CRP <3.  GI PCR from the stomal output negative other than indeterminate Norovirus.     MEDICATIONS  (STANDING):  cholecalciferol Oral Tab/Cap - Peds 06078 Unit(s) Oral every week  dextrose 5% + sodium chloride 0.9%. - Pediatric 1000 milliLiter(s) (90 mL/Hr) IV Continuous <Continuous>  loperamide Oral Tab/Cap - Peds 16 milliGRAM(s) Oral three times a day    MEDICATIONS  (PRN):  acetaminophen   Oral Tab/Cap - Peds. 650 milliGRAM(s) Oral every 6 hours PRN Mild Pain (1 - 3), Moderate Pain (4 - 6), Severe Pain (7 - 10)  LORazepam  Oral Tab/Cap - Peds 1 milliGRAM(s) Oral daily PRN for anxiety      Daily     Daily Weight in k.3 (2025 17:25)  BMI: 23.7 ( @ 17:25)  Change in Weight:  Vital Signs Last 24 Hrs  T(C): 36.4 (2025 05:45), Max: 36.8 (2025 14:28)  T(F): 97.5 (2025 05:45), Max: 98.2 (2025 14:28)  HR: 82 (2025 05:45) (82 - 92)  BP: 100/68 (2025 05:45) (96/64 - 109/71)  BP(mean): --  RR: 18 (2025 05:45) (18 - 20)  SpO2: 98% (2025 05:45) (96% - 100%)    Parameters below as of 2025 05:45  Patient On (Oxygen Delivery Method): room air      I&O's Detail    2025 07:01  -  2025 06:37  --------------------------------------------------------  IN:    dextrose 5% + sodium chloride 0.9% - Pediatric: 1890 mL  Total IN: 1890 mL    OUT:  Total OUT: 0 mL    Total NET: 1890 mL          PHYSICAL EXAM  General:  Well developed, alert and active, no pallor, NAD.  HEENT:    Normal appearance of conjunctiva, ears, nose, lips, oral mucosa, and oropharynx, anicteric.  Respiratory:  Normal respiratory effort, breath sounds equal b/l.   Abdominal:   soft, no masses, non-tender without distension, colostomy pink with no output in stomal bag, ostomy bag covering Mitrofanoff   Skin:   No rash, jaundice, lesions, or eczema.   Musculoskeletal:  No joint swelling, erythema or tenderness.   Neuro: No focal deficits.   Other:       Lab Results:                        14.7   10.26 )-----------( 474      ( 2025 05:51 )             43.1     06-20    132[L]  |  100  |  34[H]  ----------------------------<  95  3.9   |  10[LL]  |  0.88    Ca    10.4      2025 05:51    TPro  9.3[H]  /  Alb  5.5[H]  /  TBili  0.9  /  DBili  x   /  AST  21  /  ALT  19  /  AlkPhos  125[H]  06-20    LIVER FUNCTIONS - ( 2025 05:51 )  Alb: 5.5 g/dL / Pro: 9.3 g/dL / ALK PHOS: 125 U/L / ALT: 19 U/L / AST: 21 U/L / GGT: x           PT/INR - ( 2025 05:51 )   PT: 12.7 sec;   INR: 1.07 ratio         PTT - ( 2025 05:51 )  PTT:29.8 sec      Stool Results:          RADIOLOGY RESULTS:    SURGICAL PATHOLOGY:    Interval History: Continues to remain NPO on IV fluids. No output overnight, bag empty this morning and changed last night. Continues on home loperamide. MRE done yesterday evening, awaiting final read. Repeat labs this morning with chloride elevated to 111 and bicarb 11. CRP <3.  GI PCR from the stomal output negative other than indeterminate Norovirus.     MEDICATIONS  (STANDING):  cholecalciferol Oral Tab/Cap - Peds 46616 Unit(s) Oral every week  dextrose 5% + sodium chloride 0.9%. - Pediatric 1000 milliLiter(s) (90 mL/Hr) IV Continuous <Continuous>  loperamide Oral Tab/Cap - Peds 16 milliGRAM(s) Oral three times a day    MEDICATIONS  (PRN):  acetaminophen   Oral Tab/Cap - Peds. 650 milliGRAM(s) Oral every 6 hours PRN Mild Pain (1 - 3), Moderate Pain (4 - 6), Severe Pain (7 - 10)  LORazepam  Oral Tab/Cap - Peds 1 milliGRAM(s) Oral daily PRN for anxiety      Daily     Daily Weight in k.3 (2025 17:25)  BMI: 23.7 ( @ 17:25)  Change in Weight:  Vital Signs Last 24 Hrs  T(C): 36.4 (2025 05:45), Max: 36.8 (2025 14:28)  T(F): 97.5 (2025 05:45), Max: 98.2 (2025 14:28)  HR: 82 (2025 05:45) (82 - 92)  BP: 100/68 (2025 05:45) (96/64 - 109/71)  BP(mean): --  RR: 18 (2025 05:45) (18 - 20)  SpO2: 98% (2025 05:45) (96% - 100%)    Parameters below as of 2025 05:45  Patient On (Oxygen Delivery Method): room air      I&O's Detail    2025 07:01  -  2025 06:37  --------------------------------------------------------  IN:    dextrose 5% + sodium chloride 0.9% - Pediatric: 1890 mL  Total IN: 1890 mL    OUT:  Total OUT: 0 mL    Total NET: 1890 mL          PHYSICAL EXAM  General:  Well developed, alert and active, no pallor, NAD.  HEENT:    Normal appearance of conjunctiva, ears, nose, lips, oral mucosa, and oropharynx, anicteric.  Respiratory:  Normal respiratory effort, breath sounds equal b/l.   Abdominal:   soft, no masses, non-tender without distension, colostomy pink with no output in stomal bag, ostomy bag covering Mitrofanoff   Skin:   No rash, jaundice, lesions, or eczema.   Musculoskeletal:  No joint swelling, erythema or tenderness.   Neuro: No focal deficits.         Lab Results:                        14.7   10.26 )-----------( 474      ( 2025 05:51 )             43.1     06-20    132[L]  |  100  |  34[H]  ----------------------------<  95  3.9   |  10[LL]  |  0.88    Ca    10.4      2025 05:51    TPro  9.3[H]  /  Alb  5.5[H]  /  TBili  0.9  /  DBili  x   /  AST  21  /  ALT  19  /  AlkPhos  125[H]  06-20    LIVER FUNCTIONS - ( 2025 05:51 )  Alb: 5.5 g/dL / Pro: 9.3 g/dL / ALK PHOS: 125 U/L / ALT: 19 U/L / AST: 21 U/L / GGT: x           PT/INR - ( 2025 05:51 )   PT: 12.7 sec;   INR: 1.07 ratio         PTT - ( 2025 05:51 )  PTT:29.8 sec

## 2025-06-21 NOTE — PROGRESS NOTE PEDS - SUBJECTIVE AND OBJECTIVE BOX
PROGRESS NOTE:  19y Female     INTERVAL/OVERNIGHT EVENTS:   - No acute events overnight, patient well appearing at bedside this morning, says abdominal pain has since improved.     [x] History per:   [X] Family Centered Rounds Completed.   [x] There are no updates to the medical, surgical, social or family history unless described:    Review of Systems: History Per:   General: [ ] Neg  Pulmonary: [ ] Neg  Cardiac: [ ] Neg  Gastrointestinal: [ ] Neg  Ears, Nose, Throat: [ ] Neg  Renal/Urologic: [ ] Neg  Musculoskeletal: [ ] Neg  Endocrine: [ ] Neg  Hematologic: [ ] Neg  Neurologic: [ ] Neg  Allergy/Immunologic: [ ] Neg  All other systems reviewed and negative [ ]     MEDICATIONS  (STANDING):  cholecalciferol Oral Tab/Cap - Peds 03277 Unit(s) Oral every week  dextrose 5% + sodium chloride 0.9% with potassium chloride 20 mEq/L. - Pediatric 1000 milliLiter(s) (100 mL/Hr) IV Continuous <Continuous>  loperamide Oral Tab/Cap - Peds 16 milliGRAM(s) Oral three times a day    MEDICATIONS  (PRN):  acetaminophen   Oral Tab/Cap - Peds. 650 milliGRAM(s) Oral every 6 hours PRN Mild Pain (1 - 3), Moderate Pain (4 - 6), Severe Pain (7 - 10)  LORazepam  Oral Tab/Cap - Peds 1 milliGRAM(s) Oral daily PRN for anxiety    Allergies    [This allergen will not trigger allergy alert] Dermabond (Hives)  fluoroquinolone antibiotics (Unknown)  ferric carboxymaltose (Rash (Mild to Mod); Urticaria (Mild to Mod))  ciprofloxacin (Unknown)  nitroimidazole amebicides (Swelling)  latex (Unknown)  Cipro (Hives; Rash)  Flagyl (Hives)    Intolerances        DIET:     VITAL SIGNS   Vital Signs Last 24 Hrs  T(C): 36.2 (2025 10:48), Max: 36.8 (2025 14:28)  T(F): 97.1 (2025 10:48), Max: 98.2 (2025 14:28)  HR: 84 (2025 10:48) (82 - 92)  BP: 94/57 (2025 10:48) (94/57 - 104/71)  BP(mean): 69 (2025 10:48) (69 - 69)  RR: 18 (2025 10:48) (18 - 18)  SpO2: 96% (2025 10:48) (96% - 98%)    Parameters below as of 2025 10:48  Patient On (Oxygen Delivery Method): room air        Daily Weight in k.3 (2025 17:25)      I&O's Summary    2025 07:01  -  2025 07:00  --------------------------------------------------------  IN: 1990 mL / OUT: 525 mL / NET: 1465 mL    2025 07:01  -  2025 13:20  --------------------------------------------------------  IN: 1400 mL / OUT: 550 mL / NET: 850 mL        PHYSICAL EXAM  Gen: well appearing, NAD  HEENT: NC/AT, PERRLA, EOMI, MMM, Throat clear, no LAD   Heart: RRR, S1S2+, no murmur  Lungs: normal effort, CTAB  GI:  Soft, non tender, non distended. Colostomy bag in place on left side with clear yellow output. Mitrofanoff on right side. No hepatomegaly. No splenomegaly.  Ext: atraumatic, FROM, [facility]  Neuro: no focal deficits     PATIENT CARE ACCESS DEVICES  [ ] Peripheral IV  [ ] Central Venous Line, Date Placed:		Site/Device:  [ ] PICC, Date Placed:  [ ] Urinary Catheter, Date Placed:  [ ] Necessity of urinary, arterial, and venous catheters discussed    INTERVAL LAB RESULTS:                         14.7   10.26 )-----------( 474      ( 2025 05:51 )             43.1                               136    |  111    |  20                  Calcium: 9.1   / iCa: x      ( @ 05:45)    ----------------------------<  93        Magnesium: 2.20                             3.9     |  11     |  0.63             Phosphorous: 3.8        Urinalysis Basic - ( 2025 05:45 )    Color: x / Appearance: x / SG: x / pH: x  Gluc: 93 mg/dL / Ketone: x  / Bili: x / Urobili: x   Blood: x / Protein: x / Nitrite: x   Leuk Esterase: x / RBC: x / WBC x   Sq Epi: x / Non Sq Epi: x / Bacteria: x          INTERVAL IMAGING STUDIES:

## 2025-06-21 NOTE — PROGRESS NOTE PEDS - ASSESSMENT
GERI AKINS is a 19-year-old female with history of cloacal exstrophy, bladder fistula, s/p tethered cord release, bladder reconstruction, and neovagina with Mitrofanoff and colostomy, multiple spinal surgeries with last spinal procedure in December with Dr. Vargas presenting to [facility] with acute on chronic bilateral upper abdominal pain, increased ostomy output and upper back pain found to have BUN of 10 and pyuria, consistent with dehydration secondary to diarrhea and UTI pending cultures. Differential remains broad, diarrhea could be secondary to infection vs inflammation, likely to be osmotic per GI. MRE was completed, awaiting read on 6/21. Currently will remain NPO per GI with only sips of water/ice.     #Ostomy increase, abd pain  - MRE, pending read  - Prior CT inc heterogeneity in liver; c/f IBD?  - f/u celiac labs   - Continue Loperamide 16mg TID  - PMNR consulted, awaiting recs    #Pyuria, r/out UTI   - Pending urine cultures     #FENGI  - NPO with only sips of water

## 2025-06-22 VITALS
SYSTOLIC BLOOD PRESSURE: 101 MMHG | OXYGEN SATURATION: 99 % | RESPIRATION RATE: 18 BRPM | DIASTOLIC BLOOD PRESSURE: 67 MMHG | TEMPERATURE: 98 F | HEART RATE: 94 BPM

## 2025-06-22 LAB
ANION GAP SERPL CALC-SCNC: 12 MMOL/L — SIGNIFICANT CHANGE UP (ref 7–14)
BUN SERPL-MCNC: 11 MG/DL — SIGNIFICANT CHANGE UP (ref 7–23)
CALCIUM SERPL-MCNC: 8.6 MG/DL — SIGNIFICANT CHANGE UP (ref 8.4–10.5)
CHLORIDE SERPL-SCNC: 116 MMOL/L — HIGH (ref 98–107)
CO2 SERPL-SCNC: 13 MMOL/L — LOW (ref 22–31)
CREAT SERPL-MCNC: 0.65 MG/DL — SIGNIFICANT CHANGE UP (ref 0.5–1.3)
EGFR: 129 ML/MIN/1.73M2 — SIGNIFICANT CHANGE UP
EGFR: 129 ML/MIN/1.73M2 — SIGNIFICANT CHANGE UP
GI PCR PANEL: ABNORMAL
GLUCOSE SERPL-MCNC: 111 MG/DL — HIGH (ref 70–99)
MAGNESIUM SERPL-MCNC: 1.9 MG/DL — SIGNIFICANT CHANGE UP (ref 1.6–2.6)
NOROVIRUS GI+II RNA STL QL NAA+NON-PROBE: ABNORMAL
PHOSPHATE SERPL-MCNC: 4 MG/DL — SIGNIFICANT CHANGE UP (ref 2.5–4.5)
POTASSIUM SERPL-MCNC: 4.1 MMOL/L — SIGNIFICANT CHANGE UP (ref 3.5–5.3)
POTASSIUM SERPL-SCNC: 4.1 MMOL/L — SIGNIFICANT CHANGE UP (ref 3.5–5.3)
SODIUM SERPL-SCNC: 141 MMOL/L — SIGNIFICANT CHANGE UP (ref 135–145)

## 2025-06-22 PROCEDURE — 99239 HOSP IP/OBS DSCHRG MGMT >30: CPT | Mod: GC

## 2025-06-22 PROCEDURE — 99232 SBSQ HOSP IP/OBS MODERATE 35: CPT

## 2025-06-22 RX ORDER — AMOXICILLIN 500 MG/1
1 CAPSULE ORAL
Qty: 30 | Refills: 0
Start: 2025-06-22 | End: 2025-07-01

## 2025-06-22 RX ORDER — VANCOMYCIN HCL IN 5 % DEXTROSE 1.5G/250ML
770 PLASTIC BAG, INJECTION (ML) INTRAVENOUS EVERY 8 HOURS
Refills: 0 | Status: DISCONTINUED | OUTPATIENT
Start: 2025-06-22 | End: 2025-06-22

## 2025-06-22 RX ORDER — AMITRIPTYLINE HYDROCHLORIDE 25 MG/1
1 TABLET, FILM COATED ORAL
Qty: 60 | Refills: 2
Start: 2025-06-22 | End: 2025-09-19

## 2025-06-22 RX ADMIN — LOPERAMIDE HCL 16 MILLIGRAM(S): 1 SOLUTION ORAL at 10:27

## 2025-06-22 RX ADMIN — POTASSIUM CHLORIDE, DEXTROSE MONOHYDRATE AND SODIUM CHLORIDE 100 MILLILITER(S): 150; 5; 900 INJECTION, SOLUTION INTRAVENOUS at 07:08

## 2025-06-22 NOTE — PROGRESS NOTE PEDS - SUBJECTIVE AND OBJECTIVE BOX
This is a 19y Female   [ x] History per:   [ ]  utilized, number:     INTERVAL/OVERNIGHT EVENTS:     MEDICATIONS  (STANDING):  cholecalciferol Oral Tab/Cap - Peds 37992 Unit(s) Oral every week  dextrose 5% + sodium chloride 0.9% with potassium chloride 20 mEq/L. - Pediatric 1000 milliLiter(s) (100 mL/Hr) IV Continuous <Continuous>  loperamide Oral Tab/Cap - Peds 16 milliGRAM(s) Oral three times a day  sodium chloride 0.9% IV Intermittent (Bolus) - Peds 1000 milliLiter(s) IV Bolus once  vancomycin IV Intermittent - Peds 770 milliGRAM(s) IV Intermittent every 8 hours    MEDICATIONS  (PRN):  acetaminophen   Oral Tab/Cap - Peds. 650 milliGRAM(s) Oral every 6 hours PRN Mild Pain (1 - 3), Moderate Pain (4 - 6), Severe Pain (7 - 10)  LORazepam  Oral Tab/Cap - Peds 1 milliGRAM(s) Oral daily PRN for anxiety    Allergies    [This allergen will not trigger allergy alert] Dermabond (Hives)  fluoroquinolone antibiotics (Unknown)  ferric carboxymaltose (Rash (Mild to Mod); Urticaria (Mild to Mod))  ciprofloxacin (Unknown)  nitroimidazole amebicides (Swelling)  latex (Unknown)  Cipro (Hives; Rash)  Flagyl (Hives)    Intolerances        DIET:    [ x] There are no updates to the medical, surgical, social or family history unless described:    REVIEW OF SYSTEMS: If not negative (Neg) please elaborate. History Per:   General: [ ] Neg  Pulmonary: [ ] Neg  Cardiac: [ ] Neg  Gastrointestinal: [ ] Neg  Ears, Nose, Throat: [ ] Neg  Renal/Urologic: [ ] Neg  Musculoskeletal: [ ] Neg  Endocrine: [ ] Neg  Hematologic: [ ] Neg  Neurologic: [ ] Neg  Allergy/Immunologic: [ ] Neg  All other systems reviewed and negative [ x]     VITAL SIGNS AND PHYSICAL EXAM:  Vital Signs Last 24 Hrs  T(C): 36.5 (22 Jun 2025 05:41), Max: 36.7 (21 Jun 2025 22:09)  T(F): 97.7 (22 Jun 2025 05:41), Max: 98 (21 Jun 2025 22:09)  HR: 82 (22 Jun 2025 05:41) (80 - 102)  BP: 95/57 (22 Jun 2025 05:41) (90/58 - 100/64)  BP(mean): 96 (22 Jun 2025 01:50) (69 - 96)  RR: 16 (22 Jun 2025 05:41) (16 - 18)  SpO2: 95% (22 Jun 2025 05:41) (95% - 98%)    Parameters below as of 22 Jun 2025 05:41  Patient On (Oxygen Delivery Method): room air        General: Patient is in no distress and resting comfortably.  HEENT: Moist mucous membranes and no congestion.  Neck: Supple with no cervical lymphadenopathy.  Cardiac: Regular rate, with no murmurs, rubs, or gallops.  Pulm: Clear to auscultation bilaterally, with no crackles or wheezes.  Abd: + Bowel sounds. Soft nontender abdomen.  Ext: 2+ peripheral pulses. Brisk capillary refill. Full ROM of all joints.  Skin: Skin is warm and dry with no rash.  Neuro: No focal deficits.     INTERVAL LAB RESULTS:                        14.7   10.26 )-----------( 474      ( 20 Jun 2025 05:51 )             43.1                               141    |  116    |  11                  Calcium: 8.6   / iCa: x      (06-22 @ 05:26)    ----------------------------<  111       Magnesium: 1.90                             4.1     |  13     |  0.65             Phosphorous: 4.0        Urinalysis Basic - ( 22 Jun 2025 05:26 )    Color: x / Appearance: x / SG: x / pH: x  Gluc: 111 mg/dL / Ketone: x  / Bili: x / Urobili: x   Blood: x / Protein: x / Nitrite: x   Leuk Esterase: x / RBC: x / WBC x   Sq Epi: x / Non Sq Epi: x / Bacteria: x        INTERVAL IMAGING STUDIES:

## 2025-06-22 NOTE — PROGRESS NOTE PEDS - ASSESSMENT
19-year-old female with cloacal exstrophy, bladder fistula, tethered cord and imperforate anus s/p numerous surgical interventions including bladder reconstruction, neovagina, Mitrofanoff creation, colostomy creation with previous diverting ileostomy, s/p bowel resection of strictured bowel  and stomal revision in 2021 and numerous tethered cord releases here with abdominal pain and elevated ostomy output. Given output has significantly improved while NPO, elevated output seems to be osmotic rather than secretory. Differential of elevated output includes infection, however unlikely given it has been occurring for months, vs. inflammatory vs. obstructive. Colonoscopy through the stoma in April with active colitis in the distal colon and CT abdomen with IV contrast on 6/16 which showed thickening of the bowel wall in the ileum, not present on CT in March, which may represent inflammatory disease of the bowel. To better characterize, MRE obtained which showed small bowel wall thickening and hyperenhancement in the right lower quadrant without evidence of stricture, fistula or abscess. Repeat labs this AM with persistent acidemia consistent with continued dehydration, however it is reassuring that the ostomy output was only 125 cc in the last 24 hours.       Recommend:  - Send C diff from stomal outputIgA  - Continue home loperamide 16 mg TID  - Hold home mesalamine 19-year-old female with cloacal exstrophy, bladder fistula, tethered cord and imperforate anus s/p numerous surgical interventions including bladder reconstruction, neovagina, Mitrofanoff creation, colostomy creation with previous diverting ileostomy, s/p bowel resection of strictured bowel and stomal revision in 2021 and numerous tethered cord releases here with abdominal pain and elevated ostomy output. Given output significantly improved while NPO, elevated output seems to be osmotic rather than secretory. Differential of elevated output includes infection, however unlikely given it has been occurring for months, vs. inflammatory vs. obstructive. Colonoscopy through the stoma in April with active colitis in the distal colon and CT abdomen with IV contrast on 6/16 which showed thickening of the bowel wall in the ileum, not present on CT in March, which may represent inflammatory disease of the bowel. To better characterize, MRE obtained which showed small bowel wall thickening and hyperenhancement in the right lower quadrant without evidence of stricture, fistula or abscess. Patency swallowed this morning in anticipation of VCE to better assess mucosa. Repeat labs this AM with persistent acidemia consistent with continued dehydration, however it is reassuring that the ostomy output was only 175 cc in the last 24 hours.       Recommend:  - X-ray tomorrow if patency does not pass in 24-30 hours  - Send C diff from stomal output  - Continue home loperamide 16 mg TID  - Hold home mesalamine  - Remainder of care per primary team 19-year-old female with cloacal exstrophy, bladder fistula, tethered cord and imperforate anus s/p numerous surgical interventions including bladder reconstruction, neovagina, Mitrofanoff creation, colostomy creation with previous diverting ileostomy, s/p bowel resection of strictured bowel and stomal revision in 2021 and numerous tethered cord releases here with abdominal pain and elevated ostomy output. Given output significantly improved while NPO, elevated output seems to be osmotic rather than secretory. Differential of elevated output includes infection, however unlikely given it has been occurring for months, vs. inflammatory vs. obstructive. Colonoscopy through the stoma in April with active colitis in the distal colon and CT abdomen with IV contrast on 6/16 which showed thickening of the bowel wall in the ileum, not present on CT in March, which may represent inflammatory disease of the bowel. To better characterize, MRE obtained which showed small bowel wall thickening and hyperenhancement in the right lower quadrant without evidence of stricture, fistula or abscess. Patency swallowed this morning in anticipation of VCE to better assess mucosa. Repeat labs this AM with persistent acidemia consistent with continued although resolving dehydration, however it is reassuring that the ostomy output was only 175 cc in the last 24 hours.       Recommend:  - X-ray tomorrow if patency does not pass in 24-30 hours  - Send C diff from stomal output  - Continue home loperamide 16 mg TID  - Hold home mesalamine  - Remainder of care per primary team

## 2025-06-22 NOTE — PROGRESS NOTE PEDS - ASSESSMENT
GERI AKINS is a 19-year-old female with history of cloacal exstrophy, bladder fistula, s/p tethered cord release, bladder reconstruction, and neovagina with Mitrofanoff and colostomy, multiple spinal surgeries with last spinal procedure in December with Dr. Vargas [place] [facility] acute on chronic bilateral upper abdominal pain, increased ostomy output and upper back pain found to have BUN of 10 and pyuria, consistent with dehydration secondary to diarrhea and UTI pending cultures. Differential remains broad, diarrhea could be secondary to infection vs inflammation, likely to be osmotic per GI. MRE was completed, awaiting read on 6/21. Currently will remain NPO per GI with only sips of water/ice.     #Ostomy increase, abd pain  - MRE, pending read  - Prior CT inc heterogeneity in liver; c/f IBD?  - f/u celiac labs   - Continue Loperamide 16mg TID  - PMNR consulted, awaiting recs    #Pyuria, r/out UTI   - Pending urine cultures     #FENGI  - NPO with only sips of water

## 2025-06-22 NOTE — DISCHARGE NOTE NURSING/CASE MANAGEMENT/SOCIAL WORK - PATIENT PORTAL LINK FT
You can access the FollowMyHealth Patient Portal offered by [facility] by registering at the following website: [email or web address]. By joining [facility]’s FollowMyHealth portal, you will also be able to view your health information using other applications (apps) compatible with our system.

## 2025-06-22 NOTE — DISCHARGE NOTE NURSING/CASE MANAGEMENT/SOCIAL WORK - NSDCFUADDAPPT_GEN_ALL_CORE_FT
APPTS ARE READY TO BE MADE: [x] YES    Best Family or Patient Contact (if needed):    Additional Information about above appointments (if needed):    1:   2:   3:     Other comments or requests:

## 2025-06-22 NOTE — PROGRESS NOTE PEDS - SUBJECTIVE AND OBJECTIVE BOX
Interval History: Diet advanced to clears yesterday morning without increase in output and therefore further advanced to a regular diet last night. Labs this AM with bicarb 13 and chloride elevated to 116. Ostomy output 125 cc in the last 24 hours. HRs with borderline tachycardia and HPs 90s/50s. Urine culture positive for enterococcus. MRE showed small bowel wall thickening and hyperenhancement in the right lower quadrant. No adjacent fibrofatty perforation. No evidence of stricture, fistula or abscess. TTG IgA and IgG negative.       MEDICATIONS  (STANDING):  cholecalciferol Oral Tab/Cap - Peds 13210 Unit(s) Oral every week  dextrose 5% + sodium chloride 0.9% with potassium chloride 20 mEq/L. - Pediatric 1000 milliLiter(s) (100 mL/Hr) IV Continuous <Continuous>  loperamide Oral Tab/Cap - Peds 16 milliGRAM(s) Oral three times a day  vancomycin IV Intermittent - Peds 770 milliGRAM(s) IV Intermittent every 8 hours    MEDICATIONS  (PRN):  acetaminophen   Oral Tab/Cap - Peds. 650 milliGRAM(s) Oral every 6 hours PRN Mild Pain (1 - 3), Moderate Pain (4 - 6), Severe Pain (7 - 10)  LORazepam  Oral Tab/Cap - Peds 1 milliGRAM(s) Oral daily PRN for anxiety      Daily     Daily   BMI: 23.7 (06-20 @ 17:25)  Change in Weight:  Vital Signs Last 24 Hrs  T(C): 36.5 (22 Jun 2025 05:41), Max: 36.7 (21 Jun 2025 22:09)  T(F): 97.7 (22 Jun 2025 05:41), Max: 98 (21 Jun 2025 22:09)  HR: 82 (22 Jun 2025 05:41) (80 - 102)  BP: 95/57 (22 Jun 2025 05:41) (90/58 - 100/64)  BP(mean): 96 (22 Jun 2025 01:50) (69 - 96)  RR: 16 (22 Jun 2025 05:41) (16 - 18)  SpO2: 95% (22 Jun 2025 05:41) (95% - 98%)    Parameters below as of 22 Jun 2025 01:50  Patient On (Oxygen Delivery Method): room air      I&O's Detail    20 Jun 2025 07:01 - 21 Jun 2025 07:00  --------------------------------------------------------  IN:    dextrose 5% + sodium chloride 0.9% + potassium chloride 20 mEq/L - Pediatric: 100 mL    dextrose 5% + sodium chloride 0.9% - Pediatric: 1890 mL  Total IN: 1990 mL    OUT:    Intermittent Catheterization - Urethral (mL): 525 mL  Total OUT: 525 mL    Total NET: 1465 mL      21 Jun 2025 07:01 - 22 Jun 2025 06:36  --------------------------------------------------------  IN:    dextrose 5% + sodium chloride 0.9% + potassium chloride 20 mEq/L - Pediatric: 2100 mL    Sodium Chloride 0.9% Bolus - Pediatric: 1000 mL  Total IN: 3100 mL    OUT:    Ileostomy (mL): 125 mL    Intermittent Catheterization - Urethral (mL): 2275 mL  Total OUT: 2400 mL    Total NET: 700 mL          PHYSICAL EXAM  General:  Well developed, alert and active, no pallor, NAD.  HEENT:    Normal appearance of conjunctiva, ears, nose, lips, oral mucosa, and oropharynx, anicteric.  Respiratory:  Normal respiratory effort, breath sounds equal b/l.   Abdominal:   soft, no masses, non-tender without distension, colostomy pink, ostomy bag covering Mitrofanoff   Skin:   No rash, jaundice, lesions, or eczema.   Musculoskeletal:  No joint swelling, erythema or tenderness.   Neuro: No focal deficits.     Lab Results:    06-22    141  |  116[H]  |  11  ----------------------------<  111[H]  4.1   |  13[L]  |  0.65    Ca    8.6      22 Jun 2025 05:26  Phos  4.0     06-22  Mg     1.90     06-22              Stool Results:          RADIOLOGY RESULTS:    SURGICAL PATHOLOGY:    Interval History: Diet advanced to clears yesterday morning without increase in output and therefore further advanced to a regular diet last night. Labs this AM with bicarb 13 and chloride elevated to 116. Ostomy output 175 cc in the last 24 hours, thicker in consistency. MRE showed small bowel wall thickening and hyperenhancement in the right lower quadrant. No adjacent fibrofatty perforation. No evidence of stricture, fistula or abscess. Patency capsule swallowed at 10:30am today. TTG IgA and IgG negative.       MEDICATIONS  (STANDING):  cholecalciferol Oral Tab/Cap - Peds 76304 Unit(s) Oral every week  dextrose 5% + sodium chloride 0.9% with potassium chloride 20 mEq/L. - Pediatric 1000 milliLiter(s) (100 mL/Hr) IV Continuous <Continuous>  loperamide Oral Tab/Cap - Peds 16 milliGRAM(s) Oral three times a day  vancomycin IV Intermittent - Peds 770 milliGRAM(s) IV Intermittent every 8 hours    MEDICATIONS  (PRN):  acetaminophen   Oral Tab/Cap - Peds. 650 milliGRAM(s) Oral every 6 hours PRN Mild Pain (1 - 3), Moderate Pain (4 - 6), Severe Pain (7 - 10)  LORazepam  Oral Tab/Cap - Peds 1 milliGRAM(s) Oral daily PRN for anxiety      Daily     Daily   BMI: 23.7 (06-20 @ 17:25)  Change in Weight:  Vital Signs Last 24 Hrs  T(C): 36.5 (22 Jun 2025 05:41), Max: 36.7 (21 Jun 2025 22:09)  T(F): 97.7 (22 Jun 2025 05:41), Max: 98 (21 Jun 2025 22:09)  HR: 82 (22 Jun 2025 05:41) (80 - 102)  BP: 95/57 (22 Jun 2025 05:41) (90/58 - 100/64)  BP(mean): 96 (22 Jun 2025 01:50) (69 - 96)  RR: 16 (22 Jun 2025 05:41) (16 - 18)  SpO2: 95% (22 Jun 2025 05:41) (95% - 98%)    Parameters below as of 22 Jun 2025 01:50  Patient On (Oxygen Delivery Method): room air      I&O's Detail    20 Jun 2025 07:01  -  21 Jun 2025 07:00  --------------------------------------------------------  IN:    dextrose 5% + sodium chloride 0.9% + potassium chloride 20 mEq/L - Pediatric: 100 mL    dextrose 5% + sodium chloride 0.9% - Pediatric: 1890 mL  Total IN: 1990 mL    OUT:    Intermittent Catheterization - Urethral (mL): 525 mL  Total OUT: 525 mL    Total NET: 1465 mL      21 Jun 2025 07:01  -  22 Jun 2025 06:36  --------------------------------------------------------  IN:    dextrose 5% + sodium chloride 0.9% + potassium chloride 20 mEq/L - Pediatric: 2100 mL    Sodium Chloride 0.9% Bolus - Pediatric: 1000 mL  Total IN: 3100 mL    OUT:    Ileostomy (mL): 125 mL    Intermittent Catheterization - Urethral (mL): 2275 mL  Total OUT: 2400 mL    Total NET: 700 mL          PHYSICAL EXAM  General:  Well developed, alert and active, no pallor, NAD.  HEENT:    Normal appearance of conjunctiva, ears, nose, lips, oral mucosa, and oropharynx, anicteric.  Respiratory:  Normal respiratory effort, breath sounds equal b/l.   Abdominal:   soft, no masses, non-tender without distension, colostomy pink with thicker output in bag, ostomy bag covering Mitrofanoff   Skin:   No rash, jaundice, lesions, or eczema.   Musculoskeletal:  No joint swelling, erythema or tenderness.   Neuro: No focal deficits.     Lab Results:    06-22    141  |  116[H]  |  11  ----------------------------<  111[H]  4.1   |  13[L]  |  0.65    Ca    8.6      22 Jun 2025 05:26  Phos  4.0     06-22  Mg     1.90     06-22              Stool Results:          RADIOLOGY RESULTS:    SURGICAL PATHOLOGY:    Interval History: Diet advanced to clears yesterday morning without increase in output and therefore further advanced to a regular diet last night. Labs this AM with bicarb 13 and chloride elevated to 116. Ostomy output 175 cc in the last 24 hours, thicker in consistency. MRE showed small bowel wall thickening and hyperenhancement in the right lower quadrant. No adjacent fibrofatty perforation. No evidence of stricture, fistula or abscess. Patency capsule swallowed at 10:30am today. TTG IgA and IgG negative.       MEDICATIONS  (STANDING):  cholecalciferol Oral Tab/Cap - Peds 85353 Unit(s) Oral every week  dextrose 5% + sodium chloride 0.9% with potassium chloride 20 mEq/L. - Pediatric 1000 milliLiter(s) (100 mL/Hr) IV Continuous <Continuous>  loperamide Oral Tab/Cap - Peds 16 milliGRAM(s) Oral three times a day  vancomycin IV Intermittent - Peds 770 milliGRAM(s) IV Intermittent every 8 hours    MEDICATIONS  (PRN):  acetaminophen   Oral Tab/Cap - Peds. 650 milliGRAM(s) Oral every 6 hours PRN Mild Pain (1 - 3), Moderate Pain (4 - 6), Severe Pain (7 - 10)  LORazepam  Oral Tab/Cap - Peds 1 milliGRAM(s) Oral daily PRN for anxiety      Daily   BMI: 23.7 (06-20 @ 17:25)  Change in Weight:  Vital Signs Last 24 Hrs  T(C): 36.5 (22 Jun 2025 05:41), Max: 36.7 (21 Jun 2025 22:09)  T(F): 97.7 (22 Jun 2025 05:41), Max: 98 (21 Jun 2025 22:09)  HR: 82 (22 Jun 2025 05:41) (80 - 102)  BP: 95/57 (22 Jun 2025 05:41) (90/58 - 100/64)  BP(mean): 96 (22 Jun 2025 01:50) (69 - 96)  RR: 16 (22 Jun 2025 05:41) (16 - 18)  SpO2: 95% (22 Jun 2025 05:41) (95% - 98%)    Parameters below as of 22 Jun 2025 01:50  Patient On (Oxygen Delivery Method): room air      I&O's Detail    20 Jun 2025 07:01  -  21 Jun 2025 07:00  --------------------------------------------------------  IN:    dextrose 5% + sodium chloride 0.9% + potassium chloride 20 mEq/L - Pediatric: 100 mL    dextrose 5% + sodium chloride 0.9% - Pediatric: 1890 mL  Total IN: 1990 mL    OUT:    Intermittent Catheterization - Urethral (mL): 525 mL  Total OUT: 525 mL    Total NET: 1465 mL      21 Jun 2025 07:01  -  22 Jun 2025 06:36  --------------------------------------------------------  IN:    dextrose 5% + sodium chloride 0.9% + potassium chloride 20 mEq/L - Pediatric: 2100 mL    Sodium Chloride 0.9% Bolus - Pediatric: 1000 mL  Total IN: 3100 mL    OUT:    Ileostomy (mL): 125 mL    Intermittent Catheterization - Urethral (mL): 2275 mL  Total OUT: 2400 mL    Total NET: 700 mL      PHYSICAL EXAM  General:  Well developed, alert and active, no pallor, NAD.  HEENT:    Normal appearance of conjunctiva, ears, nose, lips, oral mucosa, and oropharynx, anicteric.  Respiratory:  Normal respiratory effort, breath sounds equal b/l.   Abdominal:   soft, no masses, non-tender without distension, colostomy pink with thicker output in bag, ostomy bag covering Mitrofanoff   Skin:   No rash, jaundice, lesions, or eczema.   Musculoskeletal:  No joint swelling, erythema or tenderness.   Neuro: No focal deficits.     Lab Results:    06-22    141  |  116[H]  |  11  ----------------------------<  111[H]  4.1   |  13[L]  |  0.65    Ca    8.6      22 Jun 2025 05:26  Phos  4.0     06-22  Mg     1.90     06-22

## 2025-06-22 NOTE — PROGRESS NOTE PEDS - ATTENDING COMMENTS
Attending attestation:   Patient seen and examined at approximately 12 PM on June 21st  Physical exam:  Gen: no apparent distress, appears comfortable  HEENT: normocephalic/atraumatic, moist mucous membranes, pupils equal round and reactive, extraocular movements intact, clear conjunctiva  Neck: supple  Heart: S1S2+, regular rate and rhythm, no murmur, cap refill < 2 sec, 2+ peripheral pulses  Lungs: normal respiratory pattern, clear to auscultation bilaterally  Abd: soft, nontender, nondistended, bowel sounds present, +Colostomy bag in place on left side with yellow tinged output  Ext: full range of motion, no edema, no tenderness  Neuro: no focal deficits, awake, alert, no acute change from baseline exam  Skin: no rash, intact and not indurated      A/P: Simon is a 19-year-old female with history of cloacal exstrophy, bladder fistula, s/p tethered cord release, bladder reconstruction, and neovagina with Mitrofanoff and colostomy, multiple spinal surgeries with last spinal procedure in December with Dr. Vargas presenting to [facility] [place] with acute on chronic bilateral upper abdominal pain & upper back pain, increased ostomy output over months, admitted for dehydration, pain control and further workup. Seen in the Emergency Department by surgery and neurosurgery- no surgical recommendations. GI consulted- recommend MRE & celiac labs. Patient is tolerating PO now, but per GI recommendations, made NPO to distinguish between secretory vs osmotic causes of increased output. Currently her pain is present but improved from prior- on presentation 9/10, now 5/10. No concerns for acute abdomen. MRE done- follow up results. Continue to appreciate GI recommendations. UCX still in process- follow up. Given chronic aspect of pain, PMR consulted, recommended amitriptyline, family still thinking about it.     --    High risk:  [X] 1 or more chronic illnesses with exacerbation, progression or side effects of treatment  [ ] 1 acute or chronic illness or injury that poses a threat to life or bodily function    [X] I reviewed prior external notes  [X] I reviewed test results  [ ] I ordered test  [X] I interpreted lab/ imaging   [X] I discussed management or test interpretation with the following physicians: GI    [ ] drug therapy requiring intensive monitoring for toxicity  [ ] decision regarding hospitalization or escalation of hospital-level care  [ ] decision to be DNR or to de-escalate because of poor prognosis      Family Centered Rounds completed with: patient/ Mom, bedside/charge RN, and pediatric residents.    Tracy Armas MD  Pediatric [facility] .
19-year-old female with cloacal exstrophy, bladder fistula, tethered cord and imperforate anus s/p numerous surgical interventions including bladder reconstruction, neovagina, Mitrofanoff creation, colostomy creation with previous diverting ileostomy, s/p bowel resection of strictured bowel  and stomal revision in 2021 and numerous tethered cord releases here with abdominal pain and elevated ostomy output. Given output has significantly improved while NPO, elevated output seems to be osmotic rather than secretory. Differential of elevated output includes infection, however unlikely given it has been occurring for months, vs. inflammatory vs. obstructive. Colonoscopy through the stoma in April with active colitis in the distal colon and CT abdomen with IV contrast on 6/16 which showed thickening of the bowel wall in the ileum, not present on CT in March, which may represent inflammatory disease of the bowel. To better characterize, MRE obtained yesterday. Repeat labs this AM consistent with continued dehydration with elevated chloride and bicarb of 11.     Recommend:  - Keep NPO on IV fluids, continue to resuscitate with additional fluids given continued dehydration   - Send C diff from stomal output  - Given strong family history of celiac disease, please send TTG IgA and quant IgA  - Continue home loperamide 16 mg TID  - Hold home mesalamine  - F/u MRE read
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 Given output significantly improved while NPO, elevated output seems to be osmotic rather than secretory. Differential of elevated output includes infection, however unlikely given it has been occurring for months, vs. inflammatory vs. obstructive. Colonoscopy through the stoma in April with active colitis in the distal colon and CT abdomen with IV contrast on 6/16 which showed thickening of the bowel wall in the ileum, not present on CT in March, which may represent inflammatory disease of the bowel. To better characterize, MRE obtained which showed small bowel wall thickening and hyperenhancement in the right lower quadrant without evidence of stricture, fistula or abscess. Patency swallowed this morning in anticipation of VCE to better assess mucosa. Repeat labs this AM with persistent acidemia consistent with continued although resolving dehydration, however it is reassuring that the ostomy output was only 175 cc in the last 24 hours.       Recommend:  - X-ray tomorrow if patency does not pass in 24-30 hours  - Send C diff from stomal output  - Continue home loperamide 16 mg TID  - Hold home mesalamine  - Remainder of care per primary team

## 2025-06-22 NOTE — DISCHARGE NOTE NURSING/CASE MANAGEMENT/SOCIAL WORK - FINANCIAL ASSISTANCE
[facility] provides services at a reduced cost to those who are determined to be eligible through [facility]’s financial assistance program. Information regarding [facility]’s financial assistance program can be found by going to [email or web address] or by calling [phone].

## 2025-06-23 ENCOUNTER — RESULT REVIEW (OUTPATIENT)
Age: 20
End: 2025-06-23

## 2025-06-23 ENCOUNTER — NON-APPOINTMENT (OUTPATIENT)
Age: 20
End: 2025-06-23

## 2025-06-23 ENCOUNTER — APPOINTMENT (OUTPATIENT)
Dept: RADIOLOGY | Facility: CLINIC | Age: 20
End: 2025-06-23
Payer: COMMERCIAL

## 2025-06-23 ENCOUNTER — TRANSCRIPTION ENCOUNTER (OUTPATIENT)
Age: 20
End: 2025-06-23

## 2025-06-23 ENCOUNTER — OUTPATIENT (OUTPATIENT)
Dept: OUTPATIENT SERVICES | Facility: HOSPITAL | Age: 20
LOS: 1 days | End: 2025-06-23
Payer: COMMERCIAL

## 2025-06-23 DIAGNOSIS — Z98.890 OTHER SPECIFIED POSTPROCEDURAL STATES: Chronic | ICD-10-CM

## 2025-06-23 DIAGNOSIS — Z98.89 OTHER SPECIFIED POSTPROCEDURAL STATES: Chronic | ICD-10-CM

## 2025-06-23 DIAGNOSIS — T14.90 INJURY, UNSPECIFIED: Chronic | ICD-10-CM

## 2025-06-23 DIAGNOSIS — Q45.8 OTHER SPECIFIED CONGENITAL MALFORMATIONS OF DIGESTIVE SYSTEM: Chronic | ICD-10-CM

## 2025-06-23 DIAGNOSIS — D22.9 MELANOCYTIC NEVI, UNSPECIFIED: Chronic | ICD-10-CM

## 2025-06-23 DIAGNOSIS — Q06.8 OTHER SPECIFIED CONGENITAL MALFORMATIONS OF SPINAL CORD: Chronic | ICD-10-CM

## 2025-06-23 DIAGNOSIS — K50.00 CROHN'S DISEASE OF SMALL INTESTINE WITHOUT COMPLICATIONS: ICD-10-CM

## 2025-06-23 DIAGNOSIS — Q42.3 CONGENITAL ABSENCE, ATRESIA AND STENOSIS OF ANUS WITHOUT FISTULA: Chronic | ICD-10-CM

## 2025-06-23 DIAGNOSIS — Q06.9 CONGENITAL MALFORMATION OF SPINAL CORD, UNSPECIFIED: Chronic | ICD-10-CM

## 2025-06-23 DIAGNOSIS — M95.5 ACQUIRED DEFORMITY OF PELVIS: Chronic | ICD-10-CM

## 2025-06-23 DIAGNOSIS — N35.9 URETHRAL STRICTURE, UNSPECIFIED: Chronic | ICD-10-CM

## 2025-06-23 DIAGNOSIS — N32.2 VESICAL FISTULA, NOT ELSEWHERE CLASSIFIED: Chronic | ICD-10-CM

## 2025-06-23 LAB
-  AMPICILLIN: SIGNIFICANT CHANGE UP
-  CIPROFLOXACIN: SIGNIFICANT CHANGE UP
-  LEVOFLOXACIN: SIGNIFICANT CHANGE UP
-  NITROFURANTOIN: SIGNIFICANT CHANGE UP
-  TETRACYCLINE: SIGNIFICANT CHANGE UP
-  VANCOMYCIN: SIGNIFICANT CHANGE UP
ALBUMIN SERPL ELPH-MCNC: 6.9 G/DL
ALP BLD-CCNC: 133 U/L
ALT SERPL-CCNC: 32 U/L
ANION GAP SERPL CALC-SCNC: 18 MMOL/L
APTT BLD: 28 SEC
AST SERPL-CCNC: 31 U/L
BILIRUB DIRECT SERPL-MCNC: 0.24 MG/DL
BILIRUB INDIRECT SERPL-MCNC: 0.7 MG/DL
BILIRUB SERPL-MCNC: 1 MG/DL
BUN SERPL-MCNC: 30 MG/DL
CALCIUM SERPL-MCNC: 11.3 MG/DL
CHLORIDE SERPL-SCNC: 103 MMOL/L
CO2 SERPL-SCNC: 14 MMOL/L
CREAT SERPL-MCNC: 0.97 MG/DL
CULTURE RESULTS: ABNORMAL
EGFRCR SERPLBLD CKD-EPI 2021: 86 ML/MIN/1.73M2
GLUCOSE SERPL-MCNC: 106 MG/DL
HCT VFR BLD CALC: 45.3 %
HGB BLD-MCNC: 15.5 G/DL
INR PPP: 1.09 RATIO
MCHC RBC-ENTMCNC: 31.8 PG
MCHC RBC-ENTMCNC: 34.2 G/DL
MCV RBC AUTO: 93 FL
METHOD TYPE: SIGNIFICANT CHANGE UP
ORGANISM # SPEC MICROSCOPIC CNT: ABNORMAL
ORGANISM # SPEC MICROSCOPIC CNT: ABNORMAL
PLATELET # BLD AUTO: 440 K/UL
POTASSIUM SERPL-SCNC: 4.3 MMOL/L
PROT SERPL-MCNC: 9.6 G/DL
PT BLD: 12.8 SEC
RBC # BLD: 4.87 M/UL
RBC # FLD: 12.2 %
SODIUM SERPL-SCNC: 136 MMOL/L
SPECIMEN SOURCE: SIGNIFICANT CHANGE UP
WBC # FLD AUTO: 7.44 K/UL

## 2025-06-23 PROCEDURE — 74019 RADEX ABDOMEN 2 VIEWS: CPT

## 2025-06-23 PROCEDURE — 74019 RADEX ABDOMEN 2 VIEWS: CPT | Mod: 26

## 2025-06-24 ENCOUNTER — APPOINTMENT (OUTPATIENT)
Dept: GASTROENTEROLOGY | Facility: CLINIC | Age: 20
End: 2025-06-24

## 2025-06-27 ENCOUNTER — TRANSCRIPTION ENCOUNTER (OUTPATIENT)
Age: 20
End: 2025-06-27

## 2025-06-27 ENCOUNTER — RESULT REVIEW (OUTPATIENT)
Age: 20
End: 2025-06-27

## 2025-06-27 ENCOUNTER — APPOINTMENT (OUTPATIENT)
Dept: PEDIATRIC HEMATOLOGY/ONCOLOGY | Facility: CLINIC | Age: 20
End: 2025-06-27
Payer: COMMERCIAL

## 2025-06-27 VITALS
WEIGHT: 112.88 LBS | HEART RATE: 80 BPM | BODY MASS INDEX: 23.69 KG/M2 | HEIGHT: 57.76 IN | RESPIRATION RATE: 20 BRPM | OXYGEN SATURATION: 99 % | SYSTOLIC BLOOD PRESSURE: 104 MMHG | TEMPERATURE: 97.88 F | DIASTOLIC BLOOD PRESSURE: 66 MMHG

## 2025-06-27 LAB
ANION GAP SERPL CALC-SCNC: 18 MMOL/L — HIGH (ref 7–14)
BASOPHILS # BLD AUTO: 0.04 K/UL — SIGNIFICANT CHANGE UP (ref 0–0.2)
BASOPHILS NFR BLD AUTO: 0.9 % — SIGNIFICANT CHANGE UP (ref 0–2)
BUN SERPL-MCNC: 22 MG/DL — SIGNIFICANT CHANGE UP (ref 7–23)
CALCIUM SERPL-MCNC: 9.3 MG/DL — SIGNIFICANT CHANGE UP (ref 8.4–10.5)
CHLORIDE SERPL-SCNC: 106 MMOL/L — SIGNIFICANT CHANGE UP (ref 98–107)
CO2 SERPL-SCNC: 16 MMOL/L — LOW (ref 22–31)
CREAT SERPL-MCNC: 0.57 MG/DL — SIGNIFICANT CHANGE UP (ref 0.5–1.3)
EGFR: 133 ML/MIN/1.73M2 — SIGNIFICANT CHANGE UP
EGFR: 133 ML/MIN/1.73M2 — SIGNIFICANT CHANGE UP
EOSINOPHIL # BLD AUTO: 0.08 K/UL — SIGNIFICANT CHANGE UP (ref 0–0.5)
EOSINOPHIL NFR BLD AUTO: 1.8 % — SIGNIFICANT CHANGE UP (ref 0–6)
FERRITIN SERPL-MCNC: 72 NG/ML — SIGNIFICANT CHANGE UP (ref 15–150)
GLUCOSE SERPL-MCNC: 92 MG/DL — SIGNIFICANT CHANGE UP (ref 70–99)
HCT VFR BLD CALC: 36.5 % — SIGNIFICANT CHANGE UP (ref 34.5–45)
HGB BLD-MCNC: 12.1 G/DL — SIGNIFICANT CHANGE UP (ref 11.5–15.5)
IMM GRANULOCYTES NFR BLD AUTO: 0.9 % — SIGNIFICANT CHANGE UP (ref 0–0.9)
IRON SATN MFR SERPL: 11 % — LOW (ref 14–50)
IRON SATN MFR SERPL: 38 UG/DL — SIGNIFICANT CHANGE UP (ref 30–160)
LYMPHOCYTES # BLD AUTO: 1.98 K/UL — SIGNIFICANT CHANGE UP (ref 1–3.3)
LYMPHOCYTES # BLD AUTO: 44 % — SIGNIFICANT CHANGE UP (ref 13–44)
MAGNESIUM SERPL-MCNC: 2.2 MG/DL — SIGNIFICANT CHANGE UP (ref 1.6–2.6)
MCHC RBC-ENTMCNC: 31.9 PG — SIGNIFICANT CHANGE UP (ref 27–34)
MCHC RBC-ENTMCNC: 33.2 G/DL — SIGNIFICANT CHANGE UP (ref 32–36)
MCV RBC AUTO: 96.3 FL — SIGNIFICANT CHANGE UP (ref 80–100)
MONOCYTES # BLD AUTO: 0.37 K/UL — SIGNIFICANT CHANGE UP (ref 0–0.9)
MONOCYTES NFR BLD AUTO: 8.2 % — SIGNIFICANT CHANGE UP (ref 2–14)
NEUTROPHILS # BLD AUTO: 1.99 K/UL — SIGNIFICANT CHANGE UP (ref 1.8–7.4)
NEUTROPHILS NFR BLD AUTO: 44.2 % — SIGNIFICANT CHANGE UP (ref 43–77)
NRBC BLD AUTO-RTO: 0 /100 WBCS — SIGNIFICANT CHANGE UP (ref 0–0)
PHOSPHATE SERPL-MCNC: 4.7 MG/DL — HIGH (ref 2.5–4.5)
PLATELET # BLD AUTO: 251 K/UL — SIGNIFICANT CHANGE UP (ref 150–400)
PMV BLD: 9.2 FL — SIGNIFICANT CHANGE UP (ref 7–13)
POTASSIUM SERPL-MCNC: 4.1 MMOL/L — SIGNIFICANT CHANGE UP (ref 3.5–5.3)
POTASSIUM SERPL-SCNC: 4.1 MMOL/L — SIGNIFICANT CHANGE UP (ref 3.5–5.3)
RBC # BLD: 3.79 M/UL — LOW (ref 3.8–5.2)
RBC # BLD: 3.79 M/UL — LOW (ref 3.8–5.2)
RBC # FLD: 12 % — SIGNIFICANT CHANGE UP (ref 10.3–14.5)
RETICS #: 45.9 K/UL — SIGNIFICANT CHANGE UP (ref 25–125)
RETICS/RBC NFR: 1.2 % — SIGNIFICANT CHANGE UP (ref 0.5–2.5)
SODIUM SERPL-SCNC: 140 MMOL/L — SIGNIFICANT CHANGE UP (ref 135–145)
TIBC SERPL-MCNC: 333 UG/DL — SIGNIFICANT CHANGE UP (ref 220–430)
UIBC SERPL-MCNC: 295 UG/DL — SIGNIFICANT CHANGE UP (ref 110–370)
WBC # BLD: 4.5 K/UL — SIGNIFICANT CHANGE UP (ref 3.8–10.5)
WBC # FLD AUTO: 4.5 K/UL — SIGNIFICANT CHANGE UP (ref 3.8–10.5)

## 2025-06-27 PROCEDURE — 99214 OFFICE O/P EST MOD 30 MIN: CPT

## 2025-06-28 ENCOUNTER — APPOINTMENT (OUTPATIENT)
Dept: ULTRASOUND IMAGING | Facility: CLINIC | Age: 20
End: 2025-06-28

## 2025-06-30 DIAGNOSIS — E61.1 IRON DEFICIENCY: ICD-10-CM

## 2025-06-30 DIAGNOSIS — Z93.3 COLOSTOMY STATUS: ICD-10-CM

## 2025-06-30 DIAGNOSIS — K52.9 NONINFECTIVE GASTROENTERITIS AND COLITIS, UNSPECIFIED: ICD-10-CM

## 2025-06-30 DIAGNOSIS — Q51.28 OTHER AND UNSPECIFIED DOUBLING OF UTERUS: ICD-10-CM

## 2025-06-30 DIAGNOSIS — Q06.8 OTHER SPECIFIED CONGENITAL MALFORMATIONS OF SPINAL CORD: ICD-10-CM

## 2025-06-30 DIAGNOSIS — K90.822 SHORT BOWEL SYNDROME WITHOUT COLON IN CONTINUITY: ICD-10-CM

## 2025-07-02 ENCOUNTER — APPOINTMENT (OUTPATIENT)
Dept: PEDIATRIC SURGERY | Facility: CLINIC | Age: 20
End: 2025-07-02

## 2025-07-14 ENCOUNTER — APPOINTMENT (OUTPATIENT)
Dept: CT IMAGING | Facility: CLINIC | Age: 20
End: 2025-07-14
Payer: COMMERCIAL

## 2025-07-14 ENCOUNTER — OUTPATIENT (OUTPATIENT)
Dept: OUTPATIENT SERVICES | Facility: HOSPITAL | Age: 20
LOS: 1 days | End: 2025-07-14
Payer: COMMERCIAL

## 2025-07-14 DIAGNOSIS — Q45.8 OTHER SPECIFIED CONGENITAL MALFORMATIONS OF DIGESTIVE SYSTEM: Chronic | ICD-10-CM

## 2025-07-14 DIAGNOSIS — M95.5 ACQUIRED DEFORMITY OF PELVIS: Chronic | ICD-10-CM

## 2025-07-14 DIAGNOSIS — Q42.3 CONGENITAL ABSENCE, ATRESIA AND STENOSIS OF ANUS WITHOUT FISTULA: Chronic | ICD-10-CM

## 2025-07-14 DIAGNOSIS — Z00.8 ENCOUNTER FOR OTHER GENERAL EXAMINATION: ICD-10-CM

## 2025-07-14 DIAGNOSIS — D22.9 MELANOCYTIC NEVI, UNSPECIFIED: Chronic | ICD-10-CM

## 2025-07-14 DIAGNOSIS — Q06.9 CONGENITAL MALFORMATION OF SPINAL CORD, UNSPECIFIED: Chronic | ICD-10-CM

## 2025-07-14 DIAGNOSIS — T14.90 INJURY, UNSPECIFIED: Chronic | ICD-10-CM

## 2025-07-14 DIAGNOSIS — N35.9 URETHRAL STRICTURE, UNSPECIFIED: Chronic | ICD-10-CM

## 2025-07-14 DIAGNOSIS — Z98.890 OTHER SPECIFIED POSTPROCEDURAL STATES: Chronic | ICD-10-CM

## 2025-07-14 DIAGNOSIS — Q64.12 CLOACAL EXSTROPHY OF URINARY BLADDER: Chronic | ICD-10-CM

## 2025-07-14 DIAGNOSIS — Z98.89 OTHER SPECIFIED POSTPROCEDURAL STATES: Chronic | ICD-10-CM

## 2025-07-14 DIAGNOSIS — N32.2 VESICAL FISTULA, NOT ELSEWHERE CLASSIFIED: Chronic | ICD-10-CM

## 2025-07-14 DIAGNOSIS — M20.60 ACQUIRED DEFORMITIES OF TOE(S), UNSPECIFIED, UNSPECIFIED FOOT: Chronic | ICD-10-CM

## 2025-07-14 DIAGNOSIS — N21.0 CALCULUS IN BLADDER: Chronic | ICD-10-CM

## 2025-07-14 PROCEDURE — 72131 CT LUMBAR SPINE W/O DYE: CPT | Mod: 26

## 2025-07-14 PROCEDURE — 72131 CT LUMBAR SPINE W/O DYE: CPT

## 2025-07-18 ENCOUNTER — APPOINTMENT (OUTPATIENT)
Dept: PEDIATRICS | Facility: CLINIC | Age: 20
End: 2025-07-18
Payer: COMMERCIAL

## 2025-07-18 VITALS — TEMPERATURE: 98.2 F | WEIGHT: 113 LBS

## 2025-07-18 PROBLEM — Z76.89 ENCOUNTER FOR MEDICATION ADMINISTRATION: Status: ACTIVE | Noted: 2025-07-18

## 2025-07-18 PROCEDURE — 96372 THER/PROPH/DIAG INJ SC/IM: CPT

## 2025-07-18 RX ORDER — MEDROXYPROGESTERONE ACETATE 150 MG/ML
150 INJECTION, SUSPENSION INTRAMUSCULAR
Qty: 0 | Refills: 0 | Status: COMPLETED | OUTPATIENT
Start: 2025-07-18

## 2025-07-18 RX ADMIN — MEDROXYPROGESTERONE ACETATE 1 MG/ML: 150 INJECTION, SUSPENSION INTRAMUSCULAR at 00:00

## 2025-08-13 ENCOUNTER — APPOINTMENT (OUTPATIENT)
Dept: GASTROENTEROLOGY | Facility: CLINIC | Age: 20
End: 2025-08-13

## 2025-08-13 VITALS
BODY MASS INDEX: 24.17 KG/M2 | HEART RATE: 75 BPM | DIASTOLIC BLOOD PRESSURE: 72 MMHG | SYSTOLIC BLOOD PRESSURE: 114 MMHG | OXYGEN SATURATION: 98 % | HEIGHT: 57 IN | WEIGHT: 112.01 LBS

## 2025-08-13 DIAGNOSIS — Z93.3 COLOSTOMY STATUS: ICD-10-CM

## 2025-08-13 DIAGNOSIS — Q64.12 OTHER SPECIFIED CONGENITAL MALFORMATIONS OF DIGESTIVE SYSTEM: ICD-10-CM

## 2025-08-13 DIAGNOSIS — Q45.8 OTHER SPECIFIED CONGENITAL MALFORMATIONS OF DIGESTIVE SYSTEM: ICD-10-CM

## 2025-08-13 DIAGNOSIS — K52.9 NONINFECTIVE GASTROENTERITIS AND COLITIS, UNSPECIFIED: ICD-10-CM

## 2025-09-02 ENCOUNTER — TRANSCRIPTION ENCOUNTER (OUTPATIENT)
Age: 20
End: 2025-09-02

## 2025-09-03 ENCOUNTER — TRANSCRIPTION ENCOUNTER (OUTPATIENT)
Age: 20
End: 2025-09-03

## 2025-09-10 ENCOUNTER — TRANSCRIPTION ENCOUNTER (OUTPATIENT)
Age: 20
End: 2025-09-10

## 2025-09-17 ENCOUNTER — APPOINTMENT (OUTPATIENT)
Dept: PEDIATRIC SURGERY | Facility: CLINIC | Age: 20
End: 2025-09-17
Payer: COMMERCIAL

## 2025-09-17 ENCOUNTER — APPOINTMENT (OUTPATIENT)
Dept: OBGYN | Facility: CLINIC | Age: 20
End: 2025-09-17
Payer: COMMERCIAL

## 2025-09-17 VITALS — HEIGHT: 58.82 IN | BODY MASS INDEX: 23.78 KG/M2 | TEMPERATURE: 97.88 F | WEIGHT: 116.4 LBS

## 2025-09-17 DIAGNOSIS — Q64.12 OTHER SPECIFIED CONGENITAL MALFORMATIONS OF DIGESTIVE SYSTEM: ICD-10-CM

## 2025-09-17 DIAGNOSIS — N89.5 STRICTURE AND ATRESIA OF VAGINA: ICD-10-CM

## 2025-09-17 DIAGNOSIS — R10.84 GENERALIZED ABDOMINAL PAIN: ICD-10-CM

## 2025-09-17 DIAGNOSIS — Q45.8 OTHER SPECIFIED CONGENITAL MALFORMATIONS OF DIGESTIVE SYSTEM: ICD-10-CM

## 2025-09-17 DIAGNOSIS — N94.89 OTHER SPECIFIED CONDITIONS ASSOCIATED WITH FEMALE GENITAL ORGANS AND MENSTRUAL CYCLE: ICD-10-CM

## 2025-09-17 DIAGNOSIS — Z93.8 OTHER ARTIFICIAL OPENING STATUS: ICD-10-CM

## 2025-09-17 DIAGNOSIS — Q51.28 OTHER AND UNSPECIFIED DOUBLING OF UTERUS: ICD-10-CM

## 2025-09-17 PROCEDURE — 99215 OFFICE O/P EST HI 40 MIN: CPT

## 2025-09-17 PROCEDURE — G2211 COMPLEX E/M VISIT ADD ON: CPT | Mod: NC

## 2025-09-17 PROCEDURE — 99214 OFFICE O/P EST MOD 30 MIN: CPT

## 2025-09-17 PROCEDURE — 99459 PELVIC EXAMINATION: CPT

## 2025-09-17 PROCEDURE — 99417 PROLNG OP E/M EACH 15 MIN: CPT

## 2025-09-21 PROBLEM — R10.84 ABDOMINAL PAIN, DIFFUSE: Status: ACTIVE | Noted: 2025-09-21

## (undated) DEVICE — ADAPTER CHECK FLO 9FR STERILE

## (undated) DEVICE — ELCTR STRYKER NEPTUNE SMOKE EVACUATION PENCIL (GREEN)

## (undated) DEVICE — DRAPE COVER SNAP 36X30"

## (undated) DEVICE — COVER NDL GUIDE

## (undated) DEVICE — SOL IRR BAG H2O 3000ML

## (undated) DEVICE — Device

## (undated) DEVICE — BLADE SURGICAL #15 CARBON

## (undated) DEVICE — TUBING SUCTION 20FT

## (undated) DEVICE — FOLEY CATH 2-WAY 20FR 5CC UNCOATED SILICONE

## (undated) DEVICE — SUT VICRYL 2-0 18" CT-2 (POP-OFF)

## (undated) DEVICE — NDL BIOPSY TROCAR TWO-PART 18G X 20CM

## (undated) DEVICE — GLV 6.5 PROTEXIS (WHITE)

## (undated) DEVICE — GLV 7.5 PROTEXIS (WHITE)

## (undated) DEVICE — DRAPE TOWEL BLUE STICKY

## (undated) DEVICE — PREP DURAPREP 26CC

## (undated) DEVICE — SPECIMEN CONTAINER 100ML

## (undated) DEVICE — NDL SPINAL 18G X 3.5" (PINK)

## (undated) DEVICE — DRAPE 3/4 SHEET 52X76"

## (undated) DEVICE — DRAPE MAYO STAND 23"

## (undated) DEVICE — MIDAS REX LEGEND LUBRICANT DIFFUSER CARTRIDGE

## (undated) DEVICE — LABELS BLANK W PEN

## (undated) DEVICE — SUT VICRYL 0 18" CT-1 UNDYED (POP-OFF)

## (undated) DEVICE — DRAPE TOWEL BLUE 17" X 24"

## (undated) DEVICE — GLV 7 PROTEXIS (WHITE)

## (undated) DEVICE — SUT ETHILON 3-0 18" FS-1

## (undated) DEVICE — VENODYNE/SCD SLEEVE CALF PEDS

## (undated) DEVICE — PACK NEURO MINOR

## (undated) DEVICE — BIPOLAR FORCEP STRYKER STANDARD 7" X 0.5MM (YELLOW)

## (undated) DEVICE — POSITIONER JACKSON TABLE PRONEVIEW CUSHION, CHEST, HIP, THIGH PADS

## (undated) DEVICE — DRSG TEGADERM 6"X8"

## (undated) DEVICE — DRAIN JACKSON PRATT 3 SPRING RESERVOIR W 10FR PVC DRAIN

## (undated) DEVICE — DRAPE NEPHROSCOPY 72X118"

## (undated) DEVICE — SUT MONOCRYL 4-0 18" P-3 UNDYED

## (undated) DEVICE — ACMI SELF-SEALING SEAL UP TO 7FR

## (undated) DEVICE — POSITIONER FOAM EGG CRATE ULNAR 2PCS (PINK)

## (undated) DEVICE — NDL HYPO SAFE 18G X 1.5" (PINK)

## (undated) DEVICE — ELCTR GROUNDING PAD ADULT COVIDIEN

## (undated) DEVICE — DRAPE BACK TABLE COVER 44X90"

## (undated) DEVICE — DRILL BIT STRYKER PRECISION NEURO 3X3.8MM

## (undated) DEVICE — POSITIONER CUSHION INSERT PRONE VIEW LG

## (undated) DEVICE — PREP BETADINE SPONGE STICKS

## (undated) DEVICE — SOL IRR POUR NS 0.9% 1500ML

## (undated) DEVICE — WARMING BLANKET UPPER ADULT

## (undated) DEVICE — SURGIPHOR STERILE WOUND IRR POVIDONE IODINE

## (undated) DEVICE — DRSG TELFA 3 X 8

## (undated) DEVICE — VENODYNE/SCD SLEEVE CALF LARGE

## (undated) DEVICE — SOL IRR BAG NS 0.9% 1000ML

## (undated) DEVICE — FOLEY CATH 2-WAY 12FR 5CC LATEX FREE

## (undated) DEVICE — DRAPE LAPAROTOMY W VELCRO CORD TABS

## (undated) DEVICE — TAPE SILK 3"

## (undated) DEVICE — DRSG TAPE HYPAFIX 4"

## (undated) DEVICE — DRAPE GENERAL ENDOSCOPY

## (undated) DEVICE — SOL IRR BAG NS 0.9% 3000ML

## (undated) DEVICE — SYR ASEPTO

## (undated) DEVICE — SOL IRR POUR NS 0.9% 500ML

## (undated) DEVICE — BIPOLAR FORCEP STRYKER STANDARD 8" X 1MM (YELLOW)

## (undated) DEVICE — LIJ-KMEDIC KERRISON RONGUER: Type: DURABLE MEDICAL EQUIPMENT

## (undated) DEVICE — NEPTUNE 4-PORT MANIFOLD STANDARD

## (undated) DEVICE — SOL IRR POUR H2O 1500ML

## (undated) DEVICE — PRESSURE INFUSOR BAG 3000ML

## (undated) DEVICE — DRAPE BACK TABLE COVER HEAVY DUTY 2 TIER 60"

## (undated) DEVICE — WARMING BLANKET UNDERBODY PEDS 36 X 33"

## (undated) DEVICE — NDL HYPO SAFE 21G X 1.5" (GREEN)

## (undated) DEVICE — FOLEY CATH 2-WAY 10FR 3CC SILICONE

## (undated) DEVICE — DRSG CURITY GAUZE SPONGE 4 X 4" 12-PLY

## (undated) DEVICE — SNAP ON SPHERZ 3 PACK

## (undated) DEVICE — SUT VICRYL PLUS 0 18" CT-1 UNDYED (POP-OFF)

## (undated) DEVICE — GLV 8 PROTEXIS (WHITE)

## (undated) DEVICE — DRSG AQUACEL 3.5 X 10"

## (undated) DEVICE — IRR BULB PATHFINDER + 10"

## (undated) DEVICE — SUT VICRYL 3-0 18" SH UNDYED (POP-OFF)

## (undated) DEVICE — FOLEY TRAY 16FR 5CC LF UMETER CLOSED

## (undated) DEVICE — LIJ-METRX INSTRUMENT TRAY: Type: DURABLE MEDICAL EQUIPMENT

## (undated) DEVICE — GOWN XL

## (undated) DEVICE — MIDAS REX LEGEND BALL DIAMOND LG BORE 6.0MM X 9CM

## (undated) DEVICE — STAPLER SKIN VISI-STAT 35 WIDE

## (undated) DEVICE — MIDAS REX LEGEND BALL FLUTED SM BORE 4.0MM X 10CM

## (undated) DEVICE — POSITIONER PATIENT SAFETY STRAP 3X60"

## (undated) DEVICE — FOLEY HOLDER STATLOCK 2 WAY ADULT

## (undated) DEVICE — POSITIONER ALLEN ULTRA COMFORT LARGE

## (undated) DEVICE — DRAPE SURGICAL #1010

## (undated) DEVICE — SUT POLYSORB 2-0 18" V-20

## (undated) DEVICE — SOL IRR POUR H2O 500ML

## (undated) DEVICE — CMC-MEDTRONIC STEALTH NAVIGATION: Type: DURABLE MEDICAL EQUIPMENT

## (undated) DEVICE — BIPOLAR FORCEP STRYKER STANDARD 7" X 1MM (YELLOW)

## (undated) DEVICE — NEPTUNE II 4-PORT MANIFOLD

## (undated) DEVICE — NDL ACCESS NAVIGUIDE 18G 20CM

## (undated) DEVICE — DRAPE INSTRUMENT POUCH 6.75" X 11"

## (undated) DEVICE — SOL IRR POUR NS 0.9% 1000ML

## (undated) DEVICE — SYR LUER LOK 20CC

## (undated) DEVICE — POSITIONER FOAM HEADREST (PINK)

## (undated) DEVICE — DRAPE ULTRASOUND PROBE COVER W ULTRA GEL 18X120CM

## (undated) DEVICE — BOSTON SCIENTIFC ENCORE 26 INFLATOR

## (undated) DEVICE — STAPLER SKIN MULTI DIRECTION W35

## (undated) DEVICE — MIDAS REX MR8 MATCH HEAD FLUTED LG BORE 3MM X 14CM

## (undated) DEVICE — DRAPE C ARM UNIVERSAL

## (undated) DEVICE — NDL 20CM TROCAR

## (undated) DEVICE — PACK NEURO

## (undated) DEVICE — PACK CYSTO

## (undated) DEVICE — POSITIONER STRAP ARMBOARD VELCRO TS-30

## (undated) DEVICE — GOWN TRIMAX LG

## (undated) DEVICE — MISONIX BONESCALPEL DIAMOND SHAVER & TUBESET

## (undated) DEVICE — DRAPE MINOR PROCEDURE

## (undated) DEVICE — TUBING RANGER FLUID IRRIGATION SET DISP

## (undated) DEVICE — DRSG BENZOIN 0.6CC

## (undated) DEVICE — DRAPE U 47X51" NON STERILE

## (undated) DEVICE — SUT VICRYL PLUS 0 27" OS-6 UNDYED

## (undated) DEVICE — CATH IV SAFE INSYTE 14G X 1.75" (ORANGE)

## (undated) DEVICE — SUT VICRYL PLUS 2-0 18" CP-2 UNDYED (POP-OFF)

## (undated) DEVICE — MIDAS REX LEGEND MATCH HEAD FLUTED LG BORE 3.0MM X 14CM

## (undated) DEVICE — BOSTON SCIENTIFC PUMPING SYSTEM SAPS SINGLE ACTION 10CC

## (undated) DEVICE — DRAPE EQUIPMENT BANDED BAG 30 X 30" (SHOWER CAP)